# Patient Record
Sex: MALE | Race: WHITE | NOT HISPANIC OR LATINO | Employment: OTHER | URBAN - METROPOLITAN AREA
[De-identification: names, ages, dates, MRNs, and addresses within clinical notes are randomized per-mention and may not be internally consistent; named-entity substitution may affect disease eponyms.]

---

## 2017-01-04 ENCOUNTER — HOSPITAL ENCOUNTER (OUTPATIENT)
Dept: LAB | Facility: MEDICAL CENTER | Age: 62
End: 2017-01-04
Attending: PHYSICIAN ASSISTANT
Payer: MEDICARE

## 2017-01-04 LAB
25(OH)D3 SERPL-MCNC: 35 NG/ML (ref 30–100)
ALBUMIN SERPL BCP-MCNC: 4.5 G/DL (ref 3.2–4.9)
ALBUMIN/GLOB SERPL: 1.7 G/DL
ALP SERPL-CCNC: 51 U/L (ref 30–99)
ALT SERPL-CCNC: 12 U/L (ref 2–50)
ANION GAP SERPL CALC-SCNC: 9 MMOL/L (ref 0–11.9)
AST SERPL-CCNC: 15 U/L (ref 12–45)
BASOPHILS # BLD AUTO: 0.04 K/UL (ref 0–0.12)
BASOPHILS NFR BLD AUTO: 0.5 % (ref 0–1.8)
BILIRUB SERPL-MCNC: 2.3 MG/DL (ref 0.1–1.5)
BUN SERPL-MCNC: 28 MG/DL (ref 8–22)
CALCIUM SERPL-MCNC: 9.7 MG/DL (ref 8.5–10.5)
CHLORIDE SERPL-SCNC: 103 MMOL/L (ref 96–112)
CHOLEST SERPL-MCNC: 177 MG/DL (ref 100–199)
CO2 SERPL-SCNC: 27 MMOL/L (ref 20–33)
CREAT SERPL-MCNC: 1.49 MG/DL (ref 0.5–1.4)
EOSINOPHIL # BLD: 0.27 K/UL (ref 0–0.51)
EOSINOPHIL NFR BLD AUTO: 3.5 % (ref 0–6.9)
ERYTHROCYTE [DISTWIDTH] IN BLOOD BY AUTOMATED COUNT: 43.5 FL (ref 35.9–50)
FOLATE SERPL-MCNC: 7.8 NG/ML
GLOBULIN SER CALC-MCNC: 2.7 G/DL (ref 1.9–3.5)
GLUCOSE SERPL-MCNC: 106 MG/DL (ref 65–99)
HCT VFR BLD AUTO: 52.2 % (ref 42–52)
HDLC SERPL-MCNC: 49 MG/DL
HGB BLD-MCNC: 17.6 G/DL (ref 14–18)
IMM GRANULOCYTES # BLD AUTO: 0.02 K/UL (ref 0–0.11)
IMM GRANULOCYTES NFR BLD AUTO: 0.3 % (ref 0–0.9)
IRON SATN MFR SERPL: 34 % (ref 15–55)
IRON SERPL-MCNC: 119 UG/DL (ref 50–180)
LDLC SERPL CALC-MCNC: 103 MG/DL
LYMPHOCYTES # BLD: 2.32 K/UL (ref 1–4.8)
LYMPHOCYTES NFR BLD AUTO: 30.1 % (ref 22–41)
MCH RBC QN AUTO: 32.1 PG (ref 27–33)
MCHC RBC AUTO-ENTMCNC: 33.7 G/DL (ref 33.7–35.3)
MCV RBC AUTO: 95.3 FL (ref 81.4–97.8)
MONOCYTES # BLD: 0.62 K/UL (ref 0–0.85)
MONOCYTES NFR BLD AUTO: 8 % (ref 0–13.4)
NEUTROPHILS # BLD: 4.44 K/UL (ref 1.82–7.42)
NEUTROPHILS NFR BLD AUTO: 57.6 % (ref 44–72)
NRBC # BLD AUTO: 0 K/UL
NRBC BLD-RTO: 0 /100 WBC
PLATELET # BLD AUTO: 173 K/UL (ref 164–446)
PMV BLD AUTO: 10.6 FL (ref 9–12.9)
POTASSIUM SERPL-SCNC: 3.6 MMOL/L (ref 3.6–5.5)
PREALB SERPL-MCNC: 27 MG/DL (ref 18–38)
PROT SERPL-MCNC: 7.2 G/DL (ref 6–8.2)
PTH-INTACT SERPL-MCNC: 64.4 PG/ML (ref 14–72)
RBC # BLD AUTO: 5.48 M/UL (ref 4.7–6.1)
SODIUM SERPL-SCNC: 139 MMOL/L (ref 135–145)
TIBC SERPL-MCNC: 347 UG/DL (ref 250–450)
TRANSFERRIN SERPL-MCNC: 245 MG/DL (ref 200–370)
TRIGL SERPL-MCNC: 124 MG/DL (ref 0–149)
VIT B12 SERPL-MCNC: 261 PG/ML (ref 211–911)
WBC # BLD AUTO: 7.7 K/UL (ref 4.8–10.8)

## 2017-01-04 PROCEDURE — 36415 COLL VENOUS BLD VENIPUNCTURE: CPT

## 2017-01-04 PROCEDURE — 85025 COMPLETE CBC W/AUTO DIFF WBC: CPT

## 2017-01-04 PROCEDURE — 82746 ASSAY OF FOLIC ACID SERUM: CPT

## 2017-01-04 PROCEDURE — 84425 ASSAY OF VITAMIN B-1: CPT

## 2017-01-04 PROCEDURE — 80061 LIPID PANEL: CPT

## 2017-01-04 PROCEDURE — 82607 VITAMIN B-12: CPT

## 2017-01-04 PROCEDURE — 84466 ASSAY OF TRANSFERRIN: CPT

## 2017-01-04 PROCEDURE — 80053 COMPREHEN METABOLIC PANEL: CPT

## 2017-01-04 PROCEDURE — 83970 ASSAY OF PARATHORMONE: CPT

## 2017-01-04 PROCEDURE — 83550 IRON BINDING TEST: CPT

## 2017-01-04 PROCEDURE — 82306 VITAMIN D 25 HYDROXY: CPT

## 2017-01-04 PROCEDURE — 83540 ASSAY OF IRON: CPT

## 2017-01-04 PROCEDURE — 84134 ASSAY OF PREALBUMIN: CPT

## 2017-01-07 LAB — VIT B1 BLD-MCNC: 97 NMOL/L (ref 70–180)

## 2017-01-12 ENCOUNTER — PATIENT MESSAGE (OUTPATIENT)
Dept: MEDICAL GROUP | Facility: MEDICAL CENTER | Age: 62
End: 2017-01-12

## 2017-01-12 DIAGNOSIS — N18.30 CHRONIC KIDNEY DISEASE (CKD), STAGE III (MODERATE) (HCC): ICD-10-CM

## 2017-01-12 NOTE — TELEPHONE ENCOUNTER
From: Max Alberto  To: Tim Serna M.D.  Sent: 1/12/2017 1:30 PM PST  Subject: Test Result Question    I had a appointment at Dr Carmen office today. I was asked to have you check my latest blood work results. Vale Mcdonough was questioning of any possible kidney issues. Would appreciate if you could check. And let me know.

## 2017-01-12 NOTE — TELEPHONE ENCOUNTER
From: Montse Alberto  To: Tim Serna M.D.  Sent: 1/12/2017 3:08 PM PST  Subject: Test Result Question    Have been having some back and left knee pain issues and have taken the Percocet meds (maybe a total of 3 per day, Maybe 2 or 3 days a week for the past 7-8 weeks) or 2 Aleve's 1 or 2 days when not taking the Percocet. Did use some of the Baclofen (topical cream) on the knee issue in the past 3 weeks. Do I go to any [facility] for the blood test (I usually go [facility] the lab [facility] [address]). When do you want me to go have it done?  ----- Message -----  From: Tim Serna M.D.  Sent: 1/12/2017 1:54 PM PST  To: Montse Alberto  Subject: RE: Test Result Question    Your most recently lab work shows that your kidney function was slightly more reduced than normal.  Have you been using a lot of anti-inflammatory medication like ibuprofen or Aleve, recently?  Are you hydrating well?    We should recheck blood work and have you follow up in clinic to review results. I have ordered new nonfasting labs for you to have done at [facility].    Dr. Serna         ----- Message -----   From: MONTSE ALBERTO   Sent: 1/12/2017 1:30 PM PST   To: Tim Serna M.D.  Subject: Test Result Question    I had a appointment at Dr Carmen office today. I was asked to have you check my latest blood work results. Vale Mcdonough was questioning of any possible kidney issues. Would appreciate if you could check. And let me know.

## 2017-01-17 ENCOUNTER — PATIENT MESSAGE (OUTPATIENT)
Dept: MEDICAL GROUP | Facility: MEDICAL CENTER | Age: 62
End: 2017-01-17

## 2017-01-17 NOTE — TELEPHONE ENCOUNTER
From: Montse Alberto  To: Tim Serna M.D.  Sent: 1/17/2017 6:04 AM PST  Subject: Test Result Question    Does it matter if nonfasting or fasting?  ----- Message -----  From: Tim Serna M.D.  Sent: 1/12/2017 3:10 PM PST  To: Montse Alberto  Subject: RE: Test Result Question    You can use the [facility] lab off [address]. Have the blood test done some time next week.    Dr. Serna      ----- Message -----   From: MONTSE ALBERTO   Sent: 1/12/2017 3:08 PM PST   To: Tim Serna M.D.  Subject: Test Result Question    Have been having some back and left knee pain issues and have taken the Percocet meds (maybe a total of 3 per day, Maybe 2 or 3 days a week for the past 7-8 weeks) or 2 Aleve's 1 or 2 days when not taking the Percocet. Did use some of the Baclofen (topical cream) on the knee issue in the past 3 weeks. Do I go to any [facility] for the blood test (I usually go to the lab at the hospital on [address]). When do you want me to go have it done?  ----- Message -----  From: Tim Serna M.D.  Sent: 1/12/2017 1:54 PM PST  To: Montse Alberto  Subject: RE: Test Result Question    Your most recently lab work shows that your kidney function was slightly more reduced than normal.  Have you been using a lot of anti-inflammatory medication like ibuprofen or Aleve, recently?  Are you hydrating well?    We should recheck blood work and have you follow up in clinic to review results. I have ordered new nonfasting labs for you to have done at [facility].    Dr. Serna         ----- Message -----   From: MONTSE ALBERTO   Sent: 1/12/2017 1:30 PM PST   To: Tim Serna M.D.  Subject: Test Result Question    I had a appointment at Dr Carmen office today. I was asked to have you check my latest blood work results. Rami Ceasar was questioning of any possible kidney issues. Would appreciate if you could check. And let me know.

## 2017-01-18 ENCOUNTER — HOSPITAL ENCOUNTER (OUTPATIENT)
Dept: LAB | Facility: MEDICAL CENTER | Age: 62
End: 2017-01-18
Attending: FAMILY MEDICINE
Payer: MEDICARE

## 2017-01-18 DIAGNOSIS — N18.30 CHRONIC KIDNEY DISEASE (CKD), STAGE III (MODERATE) (HCC): ICD-10-CM

## 2017-01-18 LAB
ANION GAP SERPL CALC-SCNC: 9 MMOL/L (ref 0–11.9)
BUN SERPL-MCNC: 25 MG/DL (ref 8–22)
CALCIUM SERPL-MCNC: 9.6 MG/DL (ref 8.5–10.5)
CHLORIDE SERPL-SCNC: 105 MMOL/L (ref 96–112)
CO2 SERPL-SCNC: 26 MMOL/L (ref 20–33)
CREAT SERPL-MCNC: 1.27 MG/DL (ref 0.5–1.4)
CREAT UR-MCNC: 159 MG/DL
GLUCOSE SERPL-MCNC: 96 MG/DL (ref 65–99)
MICROALBUMIN UR-MCNC: <0.7 MG/DL
MICROALBUMIN/CREAT UR: NORMAL MG/G (ref 0–30)
POTASSIUM SERPL-SCNC: 3.7 MMOL/L (ref 3.6–5.5)
SODIUM SERPL-SCNC: 140 MMOL/L (ref 135–145)

## 2017-01-18 PROCEDURE — 36415 COLL VENOUS BLD VENIPUNCTURE: CPT

## 2017-01-18 PROCEDURE — 80048 BASIC METABOLIC PNL TOTAL CA: CPT

## 2017-01-18 PROCEDURE — 82043 UR ALBUMIN QUANTITATIVE: CPT

## 2017-01-18 PROCEDURE — 82570 ASSAY OF URINE CREATININE: CPT

## 2017-01-19 ENCOUNTER — TELEPHONE (OUTPATIENT)
Dept: MEDICAL GROUP | Facility: MEDICAL CENTER | Age: 62
End: 2017-01-19

## 2017-01-19 NOTE — TELEPHONE ENCOUNTER
----- Message from Tim Serna M.D. sent at 1/19/2017  7:30 AM PST -----  Please notify patient kidney function has improved from 2 weeks ago and is now more consistent with what it was 6 months ago and one year ago. Urine test does not show any protein in urine, which means there is no active kidney damage occurring.  Overall things are stable. We will need to recheck labs in 6-12 months.  Tim Serna M.D.

## 2017-07-01 ENCOUNTER — HOSPITAL ENCOUNTER (OUTPATIENT)
Dept: LAB | Facility: MEDICAL CENTER | Age: 62
End: 2017-07-01
Attending: PHYSICIAN ASSISTANT
Payer: MEDICARE

## 2017-07-01 LAB
25(OH)D3 SERPL-MCNC: 36 NG/ML (ref 30–100)
ALBUMIN SERPL BCP-MCNC: 4.1 G/DL (ref 3.2–4.9)
ALBUMIN/GLOB SERPL: 1.4 G/DL
ALP SERPL-CCNC: 49 U/L (ref 30–99)
ALT SERPL-CCNC: 16 U/L (ref 2–50)
ANION GAP SERPL CALC-SCNC: 9 MMOL/L (ref 0–11.9)
AST SERPL-CCNC: 17 U/L (ref 12–45)
BASOPHILS # BLD AUTO: 0.5 % (ref 0–1.8)
BASOPHILS # BLD: 0.03 K/UL (ref 0–0.12)
BILIRUB SERPL-MCNC: 2.2 MG/DL (ref 0.1–1.5)
BUN SERPL-MCNC: 22 MG/DL (ref 8–22)
CALCIUM SERPL-MCNC: 9.4 MG/DL (ref 8.5–10.5)
CHLORIDE SERPL-SCNC: 104 MMOL/L (ref 96–112)
CHOLEST SERPL-MCNC: 143 MG/DL (ref 100–199)
CO2 SERPL-SCNC: 29 MMOL/L (ref 20–33)
CREAT SERPL-MCNC: 1.34 MG/DL (ref 0.5–1.4)
EOSINOPHIL # BLD AUTO: 0.33 K/UL (ref 0–0.51)
EOSINOPHIL NFR BLD: 5.4 % (ref 0–6.9)
ERYTHROCYTE [DISTWIDTH] IN BLOOD BY AUTOMATED COUNT: 43.4 FL (ref 35.9–50)
FOLATE SERPL-MCNC: 13.1 NG/ML
GFR SERPL CREATININE-BSD FRML MDRD: 54 ML/MIN/1.73 M 2
GLOBULIN SER CALC-MCNC: 3 G/DL (ref 1.9–3.5)
GLUCOSE SERPL-MCNC: 94 MG/DL (ref 65–99)
HCT VFR BLD AUTO: 50.1 % (ref 42–52)
HDLC SERPL-MCNC: 48 MG/DL
HGB BLD-MCNC: 17.4 G/DL (ref 14–18)
IMM GRANULOCYTES # BLD AUTO: 0.01 K/UL (ref 0–0.11)
IMM GRANULOCYTES NFR BLD AUTO: 0.2 % (ref 0–0.9)
IRON SATN MFR SERPL: 37 % (ref 15–55)
IRON SERPL-MCNC: 112 UG/DL (ref 50–180)
LDLC SERPL CALC-MCNC: 67 MG/DL
LYMPHOCYTES # BLD AUTO: 2.03 K/UL (ref 1–4.8)
LYMPHOCYTES NFR BLD: 33.2 % (ref 22–41)
MCH RBC QN AUTO: 32.6 PG (ref 27–33)
MCHC RBC AUTO-ENTMCNC: 34.7 G/DL (ref 33.7–35.3)
MCV RBC AUTO: 93.8 FL (ref 81.4–97.8)
MONOCYTES # BLD AUTO: 0.53 K/UL (ref 0–0.85)
MONOCYTES NFR BLD AUTO: 8.7 % (ref 0–13.4)
NEUTROPHILS # BLD AUTO: 3.19 K/UL (ref 1.82–7.42)
NEUTROPHILS NFR BLD: 52 % (ref 44–72)
NRBC # BLD AUTO: 0 K/UL
NRBC BLD AUTO-RTO: 0 /100 WBC
PLATELET # BLD AUTO: 154 K/UL (ref 164–446)
PMV BLD AUTO: 10.2 FL (ref 9–12.9)
POTASSIUM SERPL-SCNC: 4.1 MMOL/L (ref 3.6–5.5)
PREALB SERPL-MCNC: 26 MG/DL (ref 18–38)
PROT SERPL-MCNC: 7.1 G/DL (ref 6–8.2)
RBC # BLD AUTO: 5.34 M/UL (ref 4.7–6.1)
SODIUM SERPL-SCNC: 142 MMOL/L (ref 135–145)
TIBC SERPL-MCNC: 304 UG/DL (ref 250–450)
TRANSFERRIN SERPL-MCNC: 217 MG/DL (ref 200–370)
TRIGL SERPL-MCNC: 142 MG/DL (ref 0–149)
VIT B12 SERPL-MCNC: 367 PG/ML (ref 211–911)
WBC # BLD AUTO: 6.1 K/UL (ref 4.8–10.8)

## 2017-07-01 PROCEDURE — 80061 LIPID PANEL: CPT

## 2017-07-01 PROCEDURE — 82306 VITAMIN D 25 HYDROXY: CPT

## 2017-07-01 PROCEDURE — 84134 ASSAY OF PREALBUMIN: CPT

## 2017-07-01 PROCEDURE — 83550 IRON BINDING TEST: CPT

## 2017-07-01 PROCEDURE — 82746 ASSAY OF FOLIC ACID SERUM: CPT

## 2017-07-01 PROCEDURE — 36415 COLL VENOUS BLD VENIPUNCTURE: CPT

## 2017-07-01 PROCEDURE — 80053 COMPREHEN METABOLIC PANEL: CPT

## 2017-07-01 PROCEDURE — 84425 ASSAY OF VITAMIN B-1: CPT

## 2017-07-01 PROCEDURE — 84466 ASSAY OF TRANSFERRIN: CPT

## 2017-07-01 PROCEDURE — 85025 COMPLETE CBC W/AUTO DIFF WBC: CPT

## 2017-07-01 PROCEDURE — 82607 VITAMIN B-12: CPT

## 2017-07-01 PROCEDURE — 83540 ASSAY OF IRON: CPT

## 2017-07-04 LAB — VIT B1 BLD-MCNC: 97 NMOL/L (ref 70–180)

## 2017-07-10 RX ORDER — TRIAMTERENE AND HYDROCHLOROTHIAZIDE 37.5; 25 MG/1; MG/1
TABLET ORAL
Qty: 90 TAB | Refills: 3 | Status: SHIPPED | OUTPATIENT
Start: 2017-07-10 | End: 2018-06-20 | Stop reason: SDUPTHER

## 2017-07-10 NOTE — TELEPHONE ENCOUNTER
Was the patient seen in the last year in this department? Yes     Does patient have an active prescription for medications requested? No     Received Request Via: Pharmacy     Last seen: 11/14/16 Slots

## 2017-07-12 ENCOUNTER — PATIENT MESSAGE (OUTPATIENT)
Dept: HEALTH INFORMATION MANAGEMENT | Facility: OTHER | Age: 62
End: 2017-07-12

## 2017-07-12 ENCOUNTER — PATIENT OUTREACH (OUTPATIENT)
Dept: HEALTH INFORMATION MANAGEMENT | Facility: OTHER | Age: 62
End: 2017-07-12

## 2017-07-12 NOTE — PROGRESS NOTES
7/12/17   -  Outcome: Left Message    WebIZ Checked & Epic Updated:  yes    [facility] Verified: yes    Attempt # 1

## 2017-07-12 NOTE — PROGRESS NOTES
Attempt #:2    WebIZ Checked & Epic Updated: yes  HealthConnect Verified: yes  Verify PCP: yes    Communication Preference Obtained: yes     Review Care Team: yes    Annual Wellness Visit Scheduling  1. Scheduling Status:Scheduled        Care Gap Scheduling      Health Maintenance Due   Topic Date Due   • Annual Wellness Visit  SCHEDULED         MyChart Activation: already active  [employer] Gwendolyn: no  Virtual Visits: no  Opt In to Text Messages: no

## 2017-07-13 ENCOUNTER — TELEPHONE (OUTPATIENT)
Dept: MEDICAL GROUP | Facility: MEDICAL CENTER | Age: 62
End: 2017-07-13

## 2017-07-13 NOTE — TELEPHONE ENCOUNTER
ANNUAL WELLNESS VISIT PRE-VISIT PLANNING     1.  Reviewed note from last office visit with PCP: YES 11/14/16    2.  If any orders were placed at last visit, do we have Results/Consult Notes?        •  Labs - Labs ordered, completed and results are in chart.       •  Imaging - Imaging ordered, NOT completed. Patient advised to complete prior to next appointment.       •  Referrals - No referrals were ordered at last office visit.    3.  Immunizations were updated in [facility] using WebIZ?: Yes       •  WebIZ Recommendations: Td (adult), adsorbed Hep A, adult Influenza w/preserv.         •  Is patient due for Tdap? NO       •  Is patient due for Shingles? NO     4.  Patient is due for the following Health Maintenance Topics:   Health Maintenance Due   Topic Date Due   • Annual Wellness Visit  07/12/2017       - Patient is up-to-date on all Health Maintenance topics. No records have been requested at this time.    5.  Reviewed/Updated the following with patient:       •   Preferred Pharmacy? YES       •   Preferred Lab? YES       •   Medications? YES. Was Abstract Encounter opened and chart updated? YES       •   Social History? YES. Was Abstract Encounter opened and chart updated? YES       •   Family History? YES. Was Abstract Encounter opened and chart updated? YES    6.  Care Team Updated:       •   DME Company (gait device, O2, CPAP, etc.): NO       •   Other Specialists (eye doctor, derm, GYN, cardiology, endo, etc): YES    7.  Patient has the following Care Path diagnoses on Problem List:  NONE    8.  Specialty Comments was updated with diagnosis information provided by [facility]: YES    9.  Patient was advised: “This is a free wellness visit. The provider will screen for medical conditions to help you stay healthy. If you have other concerns to address you may be asked to discuss these at a separate visit or there may be an additional fee.”     6.  Patient was informed to arrive 15 min prior to their scheduled appointment  and bring in their medication bottles.

## 2017-07-18 ENCOUNTER — OFFICE VISIT (OUTPATIENT)
Dept: MEDICAL GROUP | Facility: MEDICAL CENTER | Age: 62
End: 2017-07-18
Payer: MEDICARE

## 2017-07-18 VITALS
OXYGEN SATURATION: 96 % | SYSTOLIC BLOOD PRESSURE: 110 MMHG | HEIGHT: 71 IN | WEIGHT: 280 LBS | HEART RATE: 85 BPM | TEMPERATURE: 98.2 F | DIASTOLIC BLOOD PRESSURE: 80 MMHG | BODY MASS INDEX: 39.2 KG/M2

## 2017-07-18 DIAGNOSIS — E66.9 OBESITY (BMI 30-39.9): ICD-10-CM

## 2017-07-18 DIAGNOSIS — Z96.652 HX OF TOTAL KNEE REPLACEMENT, LEFT: ICD-10-CM

## 2017-07-18 DIAGNOSIS — K21.9 GASTROESOPHAGEAL REFLUX DISEASE, ESOPHAGITIS PRESENCE NOT SPECIFIED: ICD-10-CM

## 2017-07-18 DIAGNOSIS — M54.50 CHRONIC MIDLINE LOW BACK PAIN WITHOUT SCIATICA: ICD-10-CM

## 2017-07-18 DIAGNOSIS — J30.1 SEASONAL ALLERGIC RHINITIS DUE TO POLLEN: ICD-10-CM

## 2017-07-18 DIAGNOSIS — G89.29 CHRONIC MIDLINE LOW BACK PAIN WITHOUT SCIATICA: ICD-10-CM

## 2017-07-18 DIAGNOSIS — K43.2 INCISIONAL HERNIA, WITHOUT OBSTRUCTION OR GANGRENE: ICD-10-CM

## 2017-07-18 DIAGNOSIS — N18.30 CHRONIC KIDNEY DISEASE (CKD), STAGE III (MODERATE) (HCC): ICD-10-CM

## 2017-07-18 DIAGNOSIS — M25.562 CHRONIC PAIN OF LEFT KNEE: ICD-10-CM

## 2017-07-18 DIAGNOSIS — I10 ESSENTIAL HYPERTENSION: ICD-10-CM

## 2017-07-18 DIAGNOSIS — I87.2 CHRONIC VENOUS INSUFFICIENCY: ICD-10-CM

## 2017-07-18 DIAGNOSIS — G43.909 MIGRAINE WITHOUT STATUS MIGRAINOSUS, NOT INTRACTABLE, UNSPECIFIED MIGRAINE TYPE: ICD-10-CM

## 2017-07-18 DIAGNOSIS — Z98.890 HISTORY OF GASTRIC SURGERY: ICD-10-CM

## 2017-07-18 DIAGNOSIS — G89.29 CHRONIC PAIN OF LEFT KNEE: ICD-10-CM

## 2017-07-18 DIAGNOSIS — L21.9 DERMATITIS, SEBORRHEIC: ICD-10-CM

## 2017-07-18 DIAGNOSIS — Z00.00 MEDICARE ANNUAL WELLNESS VISIT, SUBSEQUENT: ICD-10-CM

## 2017-07-18 DIAGNOSIS — H93.13 TINNITUS OF BOTH EARS: ICD-10-CM

## 2017-07-18 DIAGNOSIS — G47.30 SLEEP APNEA WITH USE OF CONTINUOUS POSITIVE AIRWAY PRESSURE (CPAP): ICD-10-CM

## 2017-07-18 PROCEDURE — G0439 PPPS, SUBSEQ VISIT: HCPCS | Performed by: FAMILY MEDICINE

## 2017-07-18 RX ORDER — AMOXICILLIN 500 MG/1
2000 CAPSULE ORAL
Qty: 16 CAP | Refills: 0 | Status: SHIPPED | OUTPATIENT
Start: 2017-07-18 | End: 2018-06-12 | Stop reason: SDUPTHER

## 2017-07-18 RX ORDER — OXYCODONE AND ACETAMINOPHEN 10; 325 MG/1; MG/1
1 TABLET ORAL 3 TIMES DAILY PRN
Qty: 90 TAB | Refills: 0 | Status: ON HOLD | OUTPATIENT
Start: 2017-07-18 | End: 2018-10-01

## 2017-07-18 ASSESSMENT — PATIENT HEALTH QUESTIONNAIRE - PHQ9: CLINICAL INTERPRETATION OF PHQ2 SCORE: 0

## 2017-07-18 NOTE — MR AVS SNAPSHOT
"        Max Alberto   2017 8:20 AM   Office Visit   MRN: [id number]    Department:  [facility]   Dept Phone:  [phone]    Description:  Male : 1955   Provider:  Tim Serna M.D.; [facility]            Reason for Visit     Annual Wellness Visit           Allergies as of 2017     Allergen Noted Reactions    Vicodin [Hydrocodone-Acetaminophen] 2014   Rash    On face only-bridge of nose      You were diagnosed with     Medicare annual wellness visit, subsequent   [581583]       Obesity (BMI 30-39.9)   [648968]       Essential hypertension   [0456879]       History of gastric surgery   [3314101]       Chronic kidney disease (CKD), stage III (moderate)   [872508]       Chronic pain of left knee   [080796]       Chronic midline low back pain without sciatica   [3582523]       Hx of total knee replacement, left   [550169]       Chronic venous insufficiency   [364030]       Migraine without status migrainosus, not intractable, unspecified migraine type   [2865187]       Gastroesophageal reflux disease, esophagitis presence not specified   [1188099]       Seasonal allergic rhinitis due to pollen   [7104713]       Sleep apnea with use of continuous positive airway pressure (CPAP)   [7389031]       Tinnitus of both ears   [029518]       Dermatitis, seborrheic   [465043]       Incisional hernia, without obstruction or gangrene   [901484]         Vital Signs     Blood Pressure Pulse Temperature Height Weight Body Mass Index    110/80 mmHg 85 36.8 °C (98.2 °F) 1.803 m (5' 11\") 127.007 kg (280 lb) 39.07 kg/m2    Oxygen Saturation Smoking Status                96% Former Smoker          Basic Information     Date Of Birth Sex Race Ethnicity Preferred Language    1955 Male White Non- English      Problem List              ICD-10-CM Priority Class Noted - Resolved    Chronic venous insufficiency I87.2   2014 - Present    HTN (hypertension) I10   2014 - " Present    Migraines G43.909   9/4/2014 - Present    GERD (gastroesophageal reflux disease) K21.9   9/4/2014 - Present    Seasonal allergies J30.2   9/4/2014 - Present    Sleep apnea with use of continuous positive airway pressure (CPAP) G47.30   9/4/2014 - Present    Tinnitus of both ears H93.13   10/13/2014 - Present    Hx of total knee replacement Z96.659   4/30/2015 - Present    Chronic lower back pain M54.5, G89.29   6/23/2015 - Present    Dermatitis, seborrheic L21.9   6/23/2015 - Present    Obesity (BMI 30-39.9) E66.9   6/23/2015 - Present    Chronic pain of left knee M25.562, G89.29   8/5/2015 - Present    Chronic kidney disease (CKD), stage III (moderate) N18.3   1/12/2017 - Present    History of gastric surgery Z98.890   7/18/2017 - Present    Incisional hernia, without obstruction or gangrene K43.2   7/18/2017 - Present      Health Maintenance        Date Due Completion Dates    IMM INFLUENZA (1) 9/1/2017 10/25/2016, 10/25/2016, 10/6/2015, 10/13/2014    COLONOSCOPY 6/16/2018 6/16/2008 (Done)    Override on 6/16/2008: Done    IMM DTaP/Tdap/Td Vaccine (2 - Td) 6/16/2025 6/16/2015            Current Immunizations     Influenza Vaccine Quad Inj (Pf) 10/25/2016  9:58 AM, 10/13/2014  8:29 AM    Influenza Vaccine Quad Inj (Preserved) 10/25/2016, 10/6/2015  8:05 AM    SHINGLES VACCINE 7/28/2016    Tdap Vaccine 6/16/2015      Below and/or attached are the medications your provider expects you to take. Review all of your home medications and newly ordered medications with your provider and/or pharmacist. Follow medication instructions as directed by your provider and/or pharmacist. Please keep your medication list with you and share with your provider. Update the information when medications are discontinued, doses are changed, or new medications (including over-the-counter products) are added; and carry medication information at all times in the event of emergency situations     Allergies:  VICODIN - Rash                Medications  Valid as of: July 18, 2017 -  8:59 AM    Generic Name Brand Name Tablet Size Instructions for use    Amoxicillin (Cap) AMOXIL 500 MG Take 4 Caps by mouth Pre-Op for 1 dose.        Cholecalciferol (Tab) cholecalciferol 1000 UNIT Take 2,000 Units by mouth every day.        Omeprazole (CAPSULE DELAYED RELEASE) PRILOSEC 20 MG TAKE ONE CAPSULE BY MOUTH EVERY DAY        Oxycodone-Acetaminophen (Tab) PERCOCET-10  MG Take 1 Tab by mouth 3 times a day as needed for Severe Pain.        Sulfacetamide Sodium (Shampoo) Sulfacetamide Sodium 10 % 1 application once daily        SUMAtriptan Succinate (Tab) IMITREX 100 MG Take 1 Tab by mouth Once PRN. migraine        Triamterene-HCTZ (Tab) MAXZIDE-25/DYAZIDE 37.5-25 MG TAKE 1 TABLET BY MOUTH EVERY DAY        .                 Medicines prescribed today were sent to:     [facility]Y #[id number] - [place], [place] - [address]    [address] [place] [address]    Phone: [phone] Fax: [phone]    Open 24 Hours?: No      Medication refill instructions:       If your prescription bottle indicates you have medication refills left, it is not necessary to call your provider’s office. Please contact your pharmacy and they will refill your medication.    If your prescription bottle indicates you do not have any refills left, you may request refills at any time through one of the following ways: The online [employer] system (except Urgent Care), by calling your provider’s office, or by asking your pharmacy to contact your provider’s office with a refill request. Medication refills are processed only during regular business hours and may not be available until the next business day. Your provider may request additional information or to have a follow-up visit with you prior to refilling your medication.   *Please Note: Medication refills are assigned a new Rx number when refilled electronically. Your pharmacy may indicate that no refills were authorized even though  a new prescription for the same medication is available at the pharmacy. Please request the medicine by name with the pharmacy before contacting your provider for a refill.           [employer] Access Code: Activation code not generated  Current [employer] Status: Active

## 2017-07-18 NOTE — PROGRESS NOTES
Chief Complaint   Patient presents with   • Annual Wellness Visit         HPI:  Max is a 62 y.o. here for Medicare Annual Wellness Visit         Patient Active Problem List    Diagnosis Date Noted   • History of gastric surgery 07/18/2017   • Incisional hernia, without obstruction or gangrene 07/18/2017   • Chronic kidney disease (CKD), stage III (moderate) 01/12/2017   • Chronic pain of left knee 08/05/2015   • Chronic lower back pain 06/23/2015   • Dermatitis, seborrheic 06/23/2015   • Obesity (BMI 30-39.9) 06/23/2015   • Hx of total knee replacement 04/30/2015   • Tinnitus of both ears 10/13/2014   • Chronic venous insufficiency 09/04/2014   • HTN (hypertension) 09/04/2014   • Migraines 09/04/2014   • GERD (gastroesophageal reflux disease) 09/04/2014   • Seasonal allergies 09/04/2014   • Sleep apnea with use of continuous positive airway pressure (CPAP) 09/04/2014       Current Outpatient Prescriptions   Medication Sig Dispense Refill   • oxycodone-acetaminophen (PERCOCET-10)  MG Tab Take 1 Tab by mouth 3 times a day as needed for Severe Pain. 90 Tab 0   • amoxicillin (AMOXIL) 500 MG Cap Take 4 Caps by mouth Pre-Op for 1 dose. 16 Cap 0   • triamterene-hctz (MAXZIDE-25/DYAZIDE) 37.5-25 MG Tab TAKE 1 TABLET BY MOUTH EVERY DAY 90 Tab 3   • Sulfacetamide Sodium 10 % Shampoo 1 application once daily     • omeprazole (PRILOSEC) 20 MG delayed-release capsule TAKE ONE CAPSULE BY MOUTH EVERY DAY 90 Cap 3   • vitamin D (CHOLECALCIFEROL) 1000 UNIT Tab Take 2,000 Units by mouth every day.     • sumatriptan (IMITREX) 100 MG tablet Take 1 Tab by mouth Once PRN. migraine 10 Tab 2     No current facility-administered medications for this visit.        The patient reports adherence to this regimen   Current supplements as per medication list.   Chronic narcotic pain medicines: yes     Allergies: Vicodin    Current social contact/activities: Pt babysit's and travels.    Exercise:currently not exercising    Is patient  current with immunizations?  no     He  reports that he quit smoking about 23 years ago. His smoking use included Cigarettes. He has a 25 pack-year smoking history. He has never used smokeless tobacco. He reports that he drinks about 0.5 oz of alcohol per week. He reports that he does not use illicit drugs.  Counseling given: Not Answered        DPA/Advanced directive: .has NO advanced directive  - add't info requested. Referral to SW: yes                        ROS:    Gait: Uses a scooter for long distance walking    Ostomy: no   Other tubes: no   Amputations: no   Chronic oxygen use no   Last eye exam 10/2016   : Denies incontinence.           Depression Screening    Little interest or pleasure in doing things?  0 - not at all  Feeling down, depressed , or hopeless? 0 - not at all  Trouble falling or staying asleep, or sleeping too much?     Feeling tired or having little energy?     Poor appetite or overeating?     Feeling bad about yourself - or that you are a failure or have let yourself or your family down?    Trouble concentrating on things, such as reading the newspaper or watching television?    Moving or speaking so slowly that other people could have noticed.  Or the opposite - being so fidgety or restless that you have been moving around a lot more than usual?     Thoughts that you would be better off dead, or of hurting yourself?     Patient Health Questionnaire Score:        If depressive symptoms identified deferred to follow up visit unless specifically addressed in assessment and plan.    Interpretation of PHQ-9 Total Score   Score Severity   1-4 No Depression   5-9 Mild Depression   10-14 Moderate Depression   15-19 Moderately Severe Depression   20-27 Severe Depression    Screening for Cognitive Impairment    Three Minute Recall (apple, watch, naomi)  3/3    Draw clock face with all 12 numbers set to the hand to show 10 minutes past 11 o'clock  1 5/5  Cognitive concerns identified deferred for  follow up unless specifically addressed in assessment and plan.    Fall Risk Assessment    Has the patient had two or more falls in the last year or any fall with injury in the last year?  No    Safety Assessment    Throw rugs on floor.  Yes  Handrails on all stairs.  Yes  Good lighting in all hallways.  Yes  Difficulty hearing.  No  Patient counseled about all safety risks that were identified.    Functional Assessment ADLs    Are there any barriers preventing you from cooking for yourself or meeting nutritional needs?  No.    Are there any barriers preventing you from driving safely or obtaining transportation?  No.    Are there any barriers preventing you from using a telephone or calling for help?  No.    Are there any barriers preventing you from shopping?  No.    Are there any barriers preventing you from taking care of your own finances?  No.    Are there any barriers preventing you from managing your medications?  No.    Are currently engaging any exercise or physical activity?  No.       Health Maintenance Summary                Annual Wellness Visit Overdue 7/12/2017      Done 7/11/2016 Visit Dx: Medicare annual wellness visit, subsequent     Patient has more history with this topic...    IMM INFLUENZA Next Due 9/1/2017      Done 10/25/2016 Imm Admin: Influenza Vaccine Quad Inj (Preserved)     Patient has more history with this topic...    COLONOSCOPY Next Due 6/16/2018      Done 6/16/2008     IMM DTaP/Tdap/Td Vaccine Next Due 6/16/2025      Done 6/16/2015 Imm Admin: Tdap Vaccine          Patient Care Team:  Tim Serna M.D. as PCP - General (Family Medicine)  Julian Carmen M.D. as Consulting Physician (Surgery)  Oscar Avila M.D. as Consulting Physician (Orthopaedics)  [facility] as Consulting Physician  Sulaiman Hook D.D.SDieter as Consulting Physician (Dentistry)  Jorge Luis Ventura M.D. as Consulting Physician (Ophthalmology)      Social History   Substance Use Topics   • Smoking status: Former  "Smoker -- 1.00 packs/day for 25 years     Types: Cigarettes     Quit date: 01/01/1994   • Smokeless tobacco: Never Used   • Alcohol Use: 0.5 oz/week     1 Cans of beer per week      Comment: occational     Family History   Problem Relation Age of Onset   • Diabetes Mother    • Arthritis Mother    • Lung Disease Father    • Arthritis Father    • Heart Disease Father    • Alcohol/Drug Brother      He  has a past medical history of Hypertension; GERD (gastroesophageal reflux disease); Headache, classical migraine; Chronic venous insufficiency (9/4/2014); Arthritis; Indigestion; Snoring; Dental disorder (12/17/14); Sleep apnea; Heart burn; Pain (12/17/14); and MEDICAL HOME. He also has no past medical history of Cold or Anesthesia.   Past Surgical History   Procedure Laterality Date   • Knee replacement, total  2012     left   • Appendectomy     • Cholecystectomy     • Gastric sleeve laparoscopy  12/29/2014     Performed by Julian Carmen M.D. at SURGERY [facility]           Exam:     Blood pressure 110/80, pulse 85, temperature 36.8 °C (98.2 °F), height 1.803 m (5' 11\"), weight 127.007 kg (280 lb), SpO2 96 %. Body mass index is 39.07 kg/(m^2).    Constitutional: Alert, no distress.  Skin: Warm, dry, good turgor, small recurrent sebaceous cyst on right upper back  Eye: Equal, round and reactive, conjunctiva clear, lids normal.  Abdomen: Soft, non-tender, no masses, no hepatosplenomegaly. No hernia appreciated but hernia is reported over incision site  Psych: Alert and oriented x3, normal affect and mood.      Assessment and Plan. The following treatment and monitoring plan is recommended:    1. Medicare annual wellness visit, subsequent  - Annual Wellness Visit - Includes PPPS Subsequent ()    2. Obesity (BMI 30-39.9)  - Patient identified as having weight management issue.  Appropriate orders and counseling given.  - Annual Wellness Visit - Includes PPPS Subsequent ()    3. Essential " hypertension  Controlled. Continue current medication.  - Annual Wellness Visit - Includes PPPS Subsequent ()    4. History of gastric surgery  Stable. Continue following with bariatric surgeon every 6 months.  - Annual Wellness Visit - Includes PPPS Subsequent ()    5. Chronic kidney disease (CKD), stage III (moderate)  Stable. Continue lab work every 6 months through bariatric surgeon office. Follow-up with us annually.  - Annual Wellness Visit - Includes PPPS Subsequent ()    6. Chronic pain of left knee  Significant pain even after knee replacement.  Patient is about 90 Percocet per year. Refill given today. Follow-up annually.  - oxycodone-acetaminophen (PERCOCET-10)  MG Tab; Take 1 Tab by mouth 3 times a day as needed for Severe Pain.  Dispense: 90 Tab; Refill: 0  - Annual Wellness Visit - Includes PPPS Subsequent ()    7. Chronic midline low back pain without sciatica  Patient is about 90 Percocet per year. Refill given today. Follow-up annually.  - oxycodone-acetaminophen (PERCOCET-10)  MG Tab; Take 1 Tab by mouth 3 times a day as needed for Severe Pain.  Dispense: 90 Tab; Refill: 0  - Annual Wellness Visit - Includes PPPS Subsequent ()    8. Hx of total knee replacement, left  Patient was advised by orthopedic surgeon to have amoxicillin before dental procedures. Prescription for amoxicillin given.  - Annual Wellness Visit - Includes PPPS Subsequent ()    9. Chronic venous insufficiency  Controlled with diuretic. Continue to monitor.  - Annual Wellness Visit - Includes PPPS Subsequent ()    10. Migraine without status migrainosus, not intractable, unspecified migraine type  Less frequent. About once monthly.  - Annual Wellness Visit - Includes PPPS Subsequent ()    11. Gastroesophageal reflux disease, esophagitis presence not specified  Controlled. Continue omeprazole.  - Annual Wellness Visit - Includes PPPS Subsequent ()    12. Seasonal allergic  rhinitis due to pollen  Irregular and related to seasons. Continue to monitor.  - Annual Wellness Visit - Includes PPPS Subsequent ()    13. Sleep apnea with use of continuous positive airway pressure (CPAP)  Continue with CPAP.  - Annual Wellness Visit - Includes PPPS Subsequent ()    14. Tinnitus of both ears  Stable. Continue to monitor.  - Annual Wellness Visit - Includes PPPS Subsequent ()    15. Dermatitis, seborrheic  Advised patient to a trial of Selsun Blue.  - Annual Wellness Visit - Includes PPPS Subsequent ()    16. Incisional hernia, without obstruction or gangrene  Advised patient to monitor for recurrence and pain. Advised patient to avoid heavy lifting.  - Annual Wellness Visit - Includes PPPS Subsequent ()        Services needed: No services needed at this time  Health Care Screening recommendations as per orders if indicated.  Referrals offered: PT/OT/Nutrition counseling/Behavioral Health/Smoking cessation as per orders if indicated.    Discussion today about general wellness and lifestyle habits:    · Prevent falls and reduce trip hazards; Cautioned about securing or removing rugs.  · Have a working fire alarm and carbon monoxide detector;   · Engage in regular physical activity and social activities    Follow-up: Return in about 1 year (around 7/18/2018) for Annual, Short.

## 2017-08-04 RX ORDER — OMEPRAZOLE 20 MG/1
CAPSULE, DELAYED RELEASE ORAL
Qty: 90 CAP | Refills: 3 | Status: SHIPPED | OUTPATIENT
Start: 2017-08-04 | End: 2018-06-28 | Stop reason: SDUPTHER

## 2017-09-19 ENCOUNTER — PATIENT MESSAGE (OUTPATIENT)
Dept: MEDICAL GROUP | Facility: MEDICAL CENTER | Age: 62
End: 2017-09-19

## 2017-09-19 NOTE — TELEPHONE ENCOUNTER
From: Max Alberto  To: Tim Serna M.D.  Sent: 9/19/2017 3:58 PM PDT  Subject: Non-Urgent Medical Question    Didn't have my eyeglasses on when I messaged earlier. Meant arthritis not artificial.     ----- Message -----  From: Tim Serna M.D.  Sent: 9/19/17, 10:49 AM  To: Max Alberto  Subject: RE: Non-Urgent Medical Question    Try taking tylenol 500 mg, 2 tablets, up to 3 times daily.    Dr. Serna        ----- Message -----   From: Max Alberto   Sent: 9/19/2017 10:34 AM PDT   To: Tim Serna M.D.  Subject: Non-Urgent Medical Question    Currently in [facility]. Am having right knee pain. Think could be artificial. What would be the best to take.

## 2017-11-28 ENCOUNTER — HOSPITAL ENCOUNTER (OUTPATIENT)
Dept: RADIOLOGY | Facility: MEDICAL CENTER | Age: 62
End: 2017-11-28
Attending: NURSE PRACTITIONER
Payer: MEDICARE

## 2017-11-28 ENCOUNTER — HOSPITAL ENCOUNTER (OUTPATIENT)
Dept: LAB | Facility: MEDICAL CENTER | Age: 62
End: 2017-11-28
Attending: NURSE PRACTITIONER
Payer: MEDICARE

## 2017-11-28 ENCOUNTER — TELEPHONE (OUTPATIENT)
Dept: MEDICAL GROUP | Facility: MEDICAL CENTER | Age: 62
End: 2017-11-28

## 2017-11-28 ENCOUNTER — OFFICE VISIT (OUTPATIENT)
Dept: MEDICAL GROUP | Facility: MEDICAL CENTER | Age: 62
End: 2017-11-28
Payer: MEDICARE

## 2017-11-28 VITALS
DIASTOLIC BLOOD PRESSURE: 80 MMHG | HEIGHT: 71 IN | BODY MASS INDEX: 38.64 KG/M2 | OXYGEN SATURATION: 97 % | SYSTOLIC BLOOD PRESSURE: 118 MMHG | WEIGHT: 276 LBS | TEMPERATURE: 96.9 F | HEART RATE: 96 BPM

## 2017-11-28 DIAGNOSIS — M25.561 CHRONIC PAIN OF RIGHT KNEE: ICD-10-CM

## 2017-11-28 DIAGNOSIS — M79.675 PAIN IN LEFT TOE(S): ICD-10-CM

## 2017-11-28 DIAGNOSIS — L21.9 DERMATITIS, SEBORRHEIC: ICD-10-CM

## 2017-11-28 DIAGNOSIS — G89.29 CHRONIC PAIN OF RIGHT KNEE: ICD-10-CM

## 2017-11-28 LAB
BASOPHILS # BLD AUTO: 0.3 % (ref 0–1.8)
BASOPHILS # BLD: 0.02 K/UL (ref 0–0.12)
EOSINOPHIL # BLD AUTO: 0.17 K/UL (ref 0–0.51)
EOSINOPHIL NFR BLD: 2.2 % (ref 0–6.9)
ERYTHROCYTE [DISTWIDTH] IN BLOOD BY AUTOMATED COUNT: 43.6 FL (ref 35.9–50)
HCT VFR BLD AUTO: 49.7 % (ref 42–52)
HGB BLD-MCNC: 17.5 G/DL (ref 14–18)
IMM GRANULOCYTES # BLD AUTO: 0.01 K/UL (ref 0–0.11)
IMM GRANULOCYTES NFR BLD AUTO: 0.1 % (ref 0–0.9)
LYMPHOCYTES # BLD AUTO: 1.62 K/UL (ref 1–4.8)
LYMPHOCYTES NFR BLD: 20.7 % (ref 22–41)
MCH RBC QN AUTO: 32.9 PG (ref 27–33)
MCHC RBC AUTO-ENTMCNC: 35.2 G/DL (ref 33.7–35.3)
MCV RBC AUTO: 93.4 FL (ref 81.4–97.8)
MONOCYTES # BLD AUTO: 0.62 K/UL (ref 0–0.85)
MONOCYTES NFR BLD AUTO: 7.9 % (ref 0–13.4)
NEUTROPHILS # BLD AUTO: 5.4 K/UL (ref 1.82–7.42)
NEUTROPHILS NFR BLD: 68.8 % (ref 44–72)
NRBC # BLD AUTO: 0 K/UL
NRBC BLD AUTO-RTO: 0 /100 WBC
PLATELET # BLD AUTO: 161 K/UL (ref 164–446)
PMV BLD AUTO: 10.8 FL (ref 9–12.9)
RBC # BLD AUTO: 5.32 M/UL (ref 4.7–6.1)
URATE SERPL-MCNC: 9.2 MG/DL (ref 2.5–8.3)
WBC # BLD AUTO: 7.8 K/UL (ref 4.8–10.8)

## 2017-11-28 PROCEDURE — 84550 ASSAY OF BLOOD/URIC ACID: CPT

## 2017-11-28 PROCEDURE — 73564 X-RAY EXAM KNEE 4 OR MORE: CPT | Mod: RT

## 2017-11-28 PROCEDURE — 36415 COLL VENOUS BLD VENIPUNCTURE: CPT

## 2017-11-28 PROCEDURE — 99214 OFFICE O/P EST MOD 30 MIN: CPT | Performed by: NURSE PRACTITIONER

## 2017-11-28 PROCEDURE — 73630 X-RAY EXAM OF FOOT: CPT | Mod: LT

## 2017-11-28 PROCEDURE — 85025 COMPLETE CBC W/AUTO DIFF WBC: CPT

## 2017-11-28 RX ORDER — COLCHICINE 0.6 MG/1
0.6 TABLET ORAL DAILY
Qty: 3 TAB | Refills: 0 | Status: SHIPPED | OUTPATIENT
Start: 2017-11-28 | End: 2018-07-17

## 2017-11-28 RX ORDER — KETOCONAZOLE 20 MG/ML
SHAMPOO TOPICAL
Qty: 1 BOTTLE | Refills: 3 | Status: SHIPPED | OUTPATIENT
Start: 2017-11-28 | End: 2018-12-13 | Stop reason: SDUPTHER

## 2017-11-28 NOTE — PROGRESS NOTES
Subjective:     Max Alberto is a 62 y.o. male who presents with   Chief Complaint   Patient presents with   • Knee Pain   • Toe Pain   .    HPI:   Seen in f/u for rt leg and knee pain.  Pain started in sept.  He just woke up one day and there was pain.  There was pain all over the knee.  He used percocet.  He saw CLARISA in the past for his left knee.  He used a compound cream on it that he had left over.  The pain got better with the cream.   He then went on a long driving trip.  The pain got better and worse.    He has used a lot of aleve.   He has been back from his trip for a month.  He is having pain about 2 days a month.  Using aleve.     Today the pain is sl worse.    His rt leg is always swollen.  Unchanged.   2 days ago he started having pain in his left 2nd and 3rd toes.  There is a red spot on distal foot at 3rd toe area.  Tender in area.  No known injury but he has new dogs at home that may have stepped on it.    He needs refill of shampoo for itching scalp.  Derm med is too expensive.        Patient Active Problem List    Diagnosis Date Noted   • History of gastric surgery 07/18/2017   • Incisional hernia, without obstruction or gangrene 07/18/2017   • Chronic kidney disease (CKD), stage III (moderate) 01/12/2017   • Chronic pain of left knee 08/05/2015   • Chronic lower back pain 06/23/2015   • Dermatitis, seborrheic 06/23/2015   • Obesity (BMI 30-39.9) 06/23/2015   • Hx of total knee replacement 04/30/2015   • Tinnitus of both ears 10/13/2014   • Chronic venous insufficiency 09/04/2014   • HTN (hypertension) 09/04/2014   • Migraines 09/04/2014   • GERD (gastroesophageal reflux disease) 09/04/2014   • Seasonal allergies 09/04/2014   • Sleep apnea with use of continuous positive airway pressure (CPAP) 09/04/2014       Current medicines (including changes today)  Current Outpatient Prescriptions   Medication Sig Dispense Refill   • ketoconazole (NIZORAL) 2 % shampoo 1 application twice daily 1  "Bottle 3   • omeprazole (PRILOSEC) 20 MG delayed-release capsule TAKE ONE CAPSULE BY MOUTH EVERY DAY 90 Cap 3   • oxycodone-acetaminophen (PERCOCET-10)  MG Tab Take 1 Tab by mouth 3 times a day as needed for Severe Pain. 90 Tab 0   • triamterene-hctz (MAXZIDE-25/DYAZIDE) 37.5-25 MG Tab TAKE 1 TABLET BY MOUTH EVERY DAY 90 Tab 3   • Sulfacetamide Sodium 10 % Shampoo 1 application once daily     • vitamin D (CHOLECALCIFEROL) 1000 UNIT Tab Take 2,000 Units by mouth every day.     • sumatriptan (IMITREX) 100 MG tablet Take 1 Tab by mouth Once PRN. migraine 10 Tab 2     No current facility-administered medications for this visit.        Allergies   Allergen Reactions   • Vicodin [Hydrocodone-Acetaminophen] Rash     On face only-bridge of nose       ROS  Constitutional: Negative. Negative for fever, chills, weight loss, malaise/fatigue and diaphoresis.   HENT: Negative. Negative for hearing loss, ear pain, nosebleeds, congestion, sore throat, neck pain, tinnitus and ear discharge.   Respiratory: Negative. Negative for cough, hemoptysis, sputum production, shortness of breath, wheezing and stridor.   Cardiovascular: Negative. Negative for chest pain, palpitations, orthopnea, claudication, PND.   Gastrointestinal: Denies nausea, vomiting, diarrhea, constipation, heartburn, melena or hematochezia.  Genitourinary: Denies dysuria, hematuria, urinary incontinence, frequency or urgency.        Objective:     Blood pressure 118/80, pulse 96, temperature 36.1 °C (96.9 °F), height 1.803 m (5' 11\"), weight (!) 125.2 kg (276 lb), SpO2 97 %. Body mass index is 38.49 kg/m².    Physical Exam:  Vitals reviewed.  Constitutional: Oriented to person, place, and time. appears well-developed and well-nourished. No distress.   Cardiovascular: Normal rate, regular rhythm, normal heart sounds and intact distal pulses. Exam reveals no gallop and no friction rub. No murmur heard. No carotid bruits.   Pulmonary/Chest: Effort normal and breath " sounds normal. No stridor. No respiratory distress. no wheezes or rales. exhibits no tenderness.   Musculoskeletal: pain left toes and rt knee with range of motion. exhibits chronic rt lower leg 2+ edema. opal pedal pulses 2+.    Neurological: Alert and oriented to person, place, and time. exhibits normal muscle tone.  Skin: Skin is warm and dry. No diaphoresis.   Psychiatric: Normal mood and affect. Behavior is normal.      Assessment and Plan:     The following treatment plan was discussed:    1. Chronic pain of right knee  DX-KNEE COMPLETE 4+ RIGHT    CBC WITH DIFFERENTIAL    URIC ACID    xray rt knee.  do lab with cbc and uric acid.  dif dx is cellulitis vs gout vs injury   2. Pain in left toe(s)  DX-FOOT-COMPLETE 3+ LEFT    CBC WITH DIFFERENTIAL    URIC ACID    xray foot.  do lab.  f/u with tp with all test results.    3. Dermatitis, seborrheic  ketoconazole (NIZORAL) 2 % shampoo    refill ketoconazole shampoo.  it is helping         Followup: Return if symptoms worsen or fail to improve.

## 2017-11-29 NOTE — TELEPHONE ENCOUNTER
Please let pt know that the knee xray shows mild OA with swelling.  The foot xray shows mild OA.  We can do MRI on knee when he is ready.  Let me know to order.

## 2017-11-29 NOTE — TELEPHONE ENCOUNTER
Please let pt know that the cbc is wnl except his plts are low.  That is chronic.  His uric acid is high.  prob some of his sx are d/t gout.  i will send in presc for colchicine.  See if that helps his sx. If yes then no further w/u needed.  If no change then will need the knee MRI.

## 2017-12-12 DIAGNOSIS — M25.561 ACUTE PAIN OF RIGHT KNEE: ICD-10-CM

## 2017-12-16 ENCOUNTER — HOSPITAL ENCOUNTER (OUTPATIENT)
Dept: RADIOLOGY | Facility: MEDICAL CENTER | Age: 62
End: 2017-12-16
Attending: NURSE PRACTITIONER
Payer: MEDICARE

## 2017-12-16 DIAGNOSIS — M25.561 ACUTE PAIN OF RIGHT KNEE: ICD-10-CM

## 2017-12-16 PROCEDURE — 73721 MRI JNT OF LWR EXTRE W/O DYE: CPT | Mod: RT

## 2017-12-18 ENCOUNTER — TELEPHONE (OUTPATIENT)
Dept: MEDICAL GROUP | Facility: MEDICAL CENTER | Age: 62
End: 2017-12-18

## 2017-12-18 DIAGNOSIS — M25.561 CHRONIC PAIN OF RIGHT KNEE: ICD-10-CM

## 2017-12-18 DIAGNOSIS — G89.29 CHRONIC PAIN OF RIGHT KNEE: ICD-10-CM

## 2017-12-19 NOTE — TELEPHONE ENCOUNTER
Please let pt know that the MRI knee shows multiple tears in ligaments, a bakers cyst, a cartilage lesion.    Needs to see ortho.  i will refer if he is ok with that.

## 2018-06-06 NOTE — PROGRESS NOTES
Outcome: Left Message    Please transfer to Patient Outreach Team at [phone] when patient returns call.    [facility] Verified: yes    Attempt # 1

## 2018-06-11 NOTE — PROGRESS NOTES
DID NOT SCHEDULE AWV, PT WILL BE HAVING KNEE SURGERY AND NEEDS DEBI WITH PCP FIRST. WILL DISCUSS AWV AT THIS DEBI

## 2018-06-12 ENCOUNTER — TELEPHONE (OUTPATIENT)
Dept: MEDICAL GROUP | Facility: MEDICAL CENTER | Age: 63
End: 2018-06-12

## 2018-06-12 ENCOUNTER — PATIENT MESSAGE (OUTPATIENT)
Dept: MEDICAL GROUP | Facility: MEDICAL CENTER | Age: 63
End: 2018-06-12

## 2018-06-12 DIAGNOSIS — I10 ESSENTIAL HYPERTENSION: ICD-10-CM

## 2018-06-12 RX ORDER — AMOXICILLIN 500 MG/1
2000 CAPSULE ORAL
Qty: 16 CAP | Refills: 0 | Status: SHIPPED | OUTPATIENT
Start: 2018-06-12 | End: 2019-06-18 | Stop reason: SDUPTHER

## 2018-06-12 NOTE — TELEPHONE ENCOUNTER
1. Caller Name: Pt                                         Call Back Number: [phone] (home)         Patient approves a detailed voicemail message: yes  Patient approves a detailed my chart message: yes    Pt is requesting labs before his next appt for Pre op surgical clearance

## 2018-06-12 NOTE — TELEPHONE ENCOUNTER
From: Max Alberto  To: Alisia Burris Med Ass't  Sent: 6/12/2018 3:51 PM PDT  Subject: RE:Fasting labs    I just remembered. I have a couple of dentist appts coming up and I need a refill of Amoxicillin 500mg capsules. Dr Serna usually gives me a 16 capsule refill.    Max  ----- Message -----  From: Alisia Burris Med Ass't  Sent: 6/12/2018 1:48 PM PDT  To: Max Alberto  Subject: Fasting labs  Javier Santana,    Fasting labs ordered.    Have a good day!    Alisia  Medical Assistant

## 2018-06-20 RX ORDER — TRIAMTERENE AND HYDROCHLOROTHIAZIDE 37.5; 25 MG/1; MG/1
TABLET ORAL
Qty: 90 TAB | Refills: 3 | Status: SHIPPED | OUTPATIENT
Start: 2018-06-20 | End: 2019-06-07 | Stop reason: SDUPTHER

## 2018-06-28 RX ORDER — OMEPRAZOLE 20 MG/1
CAPSULE, DELAYED RELEASE ORAL
Qty: 90 CAP | Refills: 3 | Status: SHIPPED | OUTPATIENT
Start: 2018-06-28 | End: 2019-06-07 | Stop reason: SDUPTHER

## 2018-07-09 ENCOUNTER — HOSPITAL ENCOUNTER (OUTPATIENT)
Dept: LAB | Facility: MEDICAL CENTER | Age: 63
End: 2018-07-09
Attending: FAMILY MEDICINE
Payer: MEDICARE

## 2018-07-09 DIAGNOSIS — I10 ESSENTIAL HYPERTENSION: ICD-10-CM

## 2018-07-09 LAB
ALBUMIN SERPL BCP-MCNC: 4.6 G/DL (ref 3.2–4.9)
ALBUMIN/GLOB SERPL: 1.6 G/DL
ALP SERPL-CCNC: 47 U/L (ref 30–99)
ALT SERPL-CCNC: 18 U/L (ref 2–50)
ANION GAP SERPL CALC-SCNC: 9 MMOL/L (ref 0–11.9)
AST SERPL-CCNC: 16 U/L (ref 12–45)
BASOPHILS # BLD AUTO: 0.5 % (ref 0–1.8)
BASOPHILS # BLD: 0.03 K/UL (ref 0–0.12)
BILIRUB SERPL-MCNC: 2.1 MG/DL (ref 0.1–1.5)
BUN SERPL-MCNC: 31 MG/DL (ref 8–22)
CALCIUM SERPL-MCNC: 9.8 MG/DL (ref 8.5–10.5)
CHLORIDE SERPL-SCNC: 105 MMOL/L (ref 96–112)
CHOLEST SERPL-MCNC: 136 MG/DL (ref 100–199)
CO2 SERPL-SCNC: 30 MMOL/L (ref 20–33)
CREAT SERPL-MCNC: 1.47 MG/DL (ref 0.5–1.4)
EOSINOPHIL # BLD AUTO: 0.31 K/UL (ref 0–0.51)
EOSINOPHIL NFR BLD: 4.9 % (ref 0–6.9)
ERYTHROCYTE [DISTWIDTH] IN BLOOD BY AUTOMATED COUNT: 46.5 FL (ref 35.9–50)
GLOBULIN SER CALC-MCNC: 2.9 G/DL (ref 1.9–3.5)
GLUCOSE SERPL-MCNC: 108 MG/DL (ref 65–99)
HCT VFR BLD AUTO: 51.5 % (ref 42–52)
HDLC SERPL-MCNC: 50 MG/DL
HGB BLD-MCNC: 17.8 G/DL (ref 14–18)
IMM GRANULOCYTES # BLD AUTO: 0.01 K/UL (ref 0–0.11)
IMM GRANULOCYTES NFR BLD AUTO: 0.2 % (ref 0–0.9)
LDLC SERPL CALC-MCNC: 59 MG/DL
LYMPHOCYTES # BLD AUTO: 1.55 K/UL (ref 1–4.8)
LYMPHOCYTES NFR BLD: 24.5 % (ref 22–41)
MCH RBC QN AUTO: 33.4 PG (ref 27–33)
MCHC RBC AUTO-ENTMCNC: 34.6 G/DL (ref 33.7–35.3)
MCV RBC AUTO: 96.6 FL (ref 81.4–97.8)
MONOCYTES # BLD AUTO: 0.6 K/UL (ref 0–0.85)
MONOCYTES NFR BLD AUTO: 9.5 % (ref 0–13.4)
NEUTROPHILS # BLD AUTO: 3.82 K/UL (ref 1.82–7.42)
NEUTROPHILS NFR BLD: 60.4 % (ref 44–72)
NRBC # BLD AUTO: 0 K/UL
NRBC BLD-RTO: 0 /100 WBC
PLATELET # BLD AUTO: 165 K/UL (ref 164–446)
PMV BLD AUTO: 10.6 FL (ref 9–12.9)
POTASSIUM SERPL-SCNC: 4.6 MMOL/L (ref 3.6–5.5)
PROT SERPL-MCNC: 7.5 G/DL (ref 6–8.2)
RBC # BLD AUTO: 5.33 M/UL (ref 4.7–6.1)
SODIUM SERPL-SCNC: 144 MMOL/L (ref 135–145)
TRIGL SERPL-MCNC: 134 MG/DL (ref 0–149)
TSH SERPL DL<=0.005 MIU/L-ACNC: 1.46 UIU/ML (ref 0.38–5.33)
WBC # BLD AUTO: 6.3 K/UL (ref 4.8–10.8)

## 2018-07-09 PROCEDURE — 36415 COLL VENOUS BLD VENIPUNCTURE: CPT

## 2018-07-09 PROCEDURE — 80061 LIPID PANEL: CPT

## 2018-07-09 PROCEDURE — 80053 COMPREHEN METABOLIC PANEL: CPT

## 2018-07-09 PROCEDURE — 84443 ASSAY THYROID STIM HORMONE: CPT

## 2018-07-09 PROCEDURE — 85025 COMPLETE CBC W/AUTO DIFF WBC: CPT

## 2018-07-17 ENCOUNTER — TELEPHONE (OUTPATIENT)
Dept: MEDICAL GROUP | Facility: MEDICAL CENTER | Age: 63
End: 2018-07-17

## 2018-07-17 ENCOUNTER — OFFICE VISIT (OUTPATIENT)
Dept: MEDICAL GROUP | Facility: MEDICAL CENTER | Age: 63
End: 2018-07-17
Payer: MEDICARE

## 2018-07-17 VITALS
TEMPERATURE: 96.9 F | DIASTOLIC BLOOD PRESSURE: 74 MMHG | WEIGHT: 284.8 LBS | OXYGEN SATURATION: 95 % | HEIGHT: 71 IN | BODY MASS INDEX: 39.87 KG/M2 | SYSTOLIC BLOOD PRESSURE: 118 MMHG | HEART RATE: 93 BPM

## 2018-07-17 DIAGNOSIS — K43.2 INCISIONAL HERNIA, WITHOUT OBSTRUCTION OR GANGRENE: ICD-10-CM

## 2018-07-17 DIAGNOSIS — M1A.09X1 IDIOPATHIC CHRONIC GOUT OF MULTIPLE SITES WITH TOPHUS: ICD-10-CM

## 2018-07-17 DIAGNOSIS — Z12.11 COLON CANCER SCREENING: ICD-10-CM

## 2018-07-17 DIAGNOSIS — E66.9 OBESITY (BMI 30-39.9): ICD-10-CM

## 2018-07-17 DIAGNOSIS — I10 ESSENTIAL HYPERTENSION: ICD-10-CM

## 2018-07-17 DIAGNOSIS — N18.30 CHRONIC KIDNEY DISEASE (CKD), STAGE III (MODERATE) (HCC): ICD-10-CM

## 2018-07-17 DIAGNOSIS — M25.561 CHRONIC PAIN OF RIGHT KNEE: ICD-10-CM

## 2018-07-17 DIAGNOSIS — G89.29 CHRONIC PAIN OF RIGHT KNEE: ICD-10-CM

## 2018-07-17 PROCEDURE — 93000 ELECTROCARDIOGRAM COMPLETE: CPT | Performed by: FAMILY MEDICINE

## 2018-07-17 PROCEDURE — 99214 OFFICE O/P EST MOD 30 MIN: CPT | Performed by: FAMILY MEDICINE

## 2018-07-17 RX ORDER — ALLOPURINOL 300 MG/1
300 TABLET ORAL DAILY
Qty: 90 TAB | Refills: 3 | Status: SHIPPED | OUTPATIENT
Start: 2018-07-17 | End: 2019-06-18 | Stop reason: SDUPTHER

## 2018-07-17 ASSESSMENT — PATIENT HEALTH QUESTIONNAIRE - PHQ9: CLINICAL INTERPRETATION OF PHQ2 SCORE: 0

## 2018-07-17 NOTE — ASSESSMENT & PLAN NOTE
Patient had a flareup of knee pain in September, which was severe and lasted several days.  His knee eventually calm down.  He had an x-ray of his knee in November which showed mild osteoarthritis.  He has history of left knee replacement.  Orthopedic has suggested he might need right knee replacement.  He has history of gout which was untreated.  He has various tophus on fingers.

## 2018-07-17 NOTE — ASSESSMENT & PLAN NOTE
Patient had a gout attack and was treated with colchicine in November.  He has not been on prevention medication.  He states he has nodules in fingers that come and go.

## 2018-07-17 NOTE — PROGRESS NOTES
Subjective:   Max Alberto is a 63 y.o. male here today for right knee pain    Chronic pain of right knee  Patient had a flareup of knee pain in September, which was severe and lasted several days.  His knee eventually calm down.  He had an x-ray of his knee in November which showed mild osteoarthritis.  He has history of left knee replacement.  Orthopedic has suggested he might need right knee replacement.  He has history of gout which was untreated.  He has various tophus on fingers.    Chronic kidney disease (CKD), stage III (moderate)  Patient had recent lab work done which shows a stable GFR in the high 40s.    HTN (hypertension)  Patient is tolerating Maxide.    Idiopathic chronic gout of multiple sites with tophus  Patient had a gout attack and was treated with colchicine in November.  He has not been on prevention medication.  He states he has nodules in fingers that come and go.    Incisional hernia, without obstruction or gangrene  Patient has a bulging hernia over gastric sleeve incision site.  He states that the bulging occurs now every other week instead of once a month.  When bulging occurs it is painful.    Obesity (BMI 30-39.9)  Patient has not been eating a healthy diet recently and as result his weight has gone up.         Current medicines (including changes today)  Current Outpatient Prescriptions   Medication Sig Dispense Refill   • allopurinol (ZYLOPRIM) 300 MG Tab Take 1 Tab by mouth every day. 90 Tab 3   • omeprazole (PRILOSEC) 20 MG delayed-release capsule TAKE ONE CAPSULE BY MOUTH EVERY DAY 90 Cap 3   • triamterene-hctz (MAXZIDE-25/DYAZIDE) 37.5-25 MG Tab TAKE 1 TABLET BY MOUTH EVERY DAY 90 Tab 3   • ketoconazole (NIZORAL) 2 % shampoo 1 application twice daily 1 Bottle 3   • oxycodone-acetaminophen (PERCOCET-10)  MG Tab Take 1 Tab by mouth 3 times a day as needed for Severe Pain. 90 Tab 0   • Sulfacetamide Sodium 10 % Shampoo 1 application once daily     • vitamin D  "(CHOLECALCIFEROL) 1000 UNIT Tab Take 2,000 Units by mouth every day.     • sumatriptan (IMITREX) 100 MG tablet Take 1 Tab by mouth Once PRN. migraine 10 Tab 2     No current facility-administered medications for this visit.      He  has a past medical history of Arthritis; Chronic venous insufficiency (9/4/2014); Dental disorder (12/17/14); GERD (gastroesophageal reflux disease); Headache, classical migraine; Heart burn; Hypertension; Indigestion; MEDICAL HOME; Pain (12/17/14); Sleep apnea; and Snoring. He also has no past medical history of Anesthesia or Cold.    ROS   No chest pain, no shortness of breath, no abdominal pain       Objective:     Blood pressure 118/74, pulse 93, temperature 36.1 °C (96.9 °F), height 1.803 m (5' 11\"), weight (!) 129.2 kg (284 lb 12.8 oz), SpO2 95 %. Body mass index is 39.72 kg/m².   Physical Exam:  Constitutional: Alert, no distress.  Skin: Warm, dry, good turgor, no rashes in visible areas.  Eye: Equal, round and reactive, conjunctiva clear, lids normal.  Psych: Alert and oriented x3, normal affect and mood.  Fingers: multiple tophus on DIP and PIP joints.    EKG Interpretation-HR is 74 normal EKG, normal sinus rhythm           Assessment and Plan:   The following treatment plan was discussed    1. Chronic pain of right knee  Mild osteoarthritis.  Patient may have had a gout flare.  Will treat with allopurinol and monitor.  Advised patient that his x-rays do not appear that he absolutely needs knee surgery at this time.  Advised patient to monitor if he can.    2. Idiopathic chronic gout of multiple sites with tophus  Start patient on allopurinol 300 mg daily.    3. Incisional hernia, without obstruction or gangrene  Referral to general surgery for evaluation and possible treatment.  - REFERRAL TO GENERAL SURGERY    4. Chronic kidney disease (CKD), stage III (moderate)  Stable.  Continue to monitor.    5. Essential hypertension  Controlled.  EKG with normal sinus rhythm.  - EKG - " Clinic Performed    6. Obesity (BMI 30-39.9)  - Patient identified as having weight management issue.  Appropriate orders and counseling given.    7. Colon cancer screening  Referral to GI.  - REFERRAL TO GASTROENTEROLOGY      Followup: Return if symptoms worsen or fail to improve, for Annual.

## 2018-07-17 NOTE — ASSESSMENT & PLAN NOTE
Patient has a bulging hernia over gastric sleeve incision site.  He states that the bulging occurs now every other week instead of once a month.  When bulging occurs it is painful.

## 2018-07-17 NOTE — TELEPHONE ENCOUNTER
1. Caller Name: Pt                                         Call Back Number: [phone] (home)         Patient approves a detailed voicemail message: N\A    Left message for patient to call office     Please transfer pt to [phone]

## 2018-07-18 ENCOUNTER — TELEPHONE (OUTPATIENT)
Dept: MEDICAL GROUP | Facility: MEDICAL CENTER | Age: 63
End: 2018-07-18

## 2018-07-18 DIAGNOSIS — K43.2 INCISIONAL HERNIA, WITHOUT OBSTRUCTION OR GANGRENE: ICD-10-CM

## 2018-07-18 NOTE — TELEPHONE ENCOUNTER
Left message for patient to call back regarding AWV appt. Please transfer call to [phone].

## 2018-07-18 NOTE — TELEPHONE ENCOUNTER
1. Caller Name: Pt                                         Call Back Number: [phone] (home)         Patient approves a detailed voicemail message: N\A    2. SPECIFIC Action To Be Taken: Referral update requested    3. Diagnosis/Clinical Reason for Request: K43.2 (ICD-10-CM) - Incisional hernia, without obstruction or gangrene.  Pt states that he has had issues with [facility] in the past and feels more comfortable with Dr. Julian Carmen    4. Specialty & Provider Name/Lab/Imaging Location: [facility]/Julian Carmen MD    5. Is appointment scheduled for requested order/referral: no    Patient was informed they will receive a return phone call from the office ONLY if there are any questions before processing their request. Advised to call back if they haven't received a call from the referral department in 5 days.

## 2018-08-16 ENCOUNTER — TELEPHONE (OUTPATIENT)
Dept: MEDICAL GROUP | Facility: MEDICAL CENTER | Age: 63
End: 2018-08-16

## 2018-08-16 NOTE — TELEPHONE ENCOUNTER
ANNUAL WELLNESS VISIT PRE-VISIT PLANNING WITHOUT OUTREACH    1.  Reviewed note from last office visit with PCP: YES    2.  If any orders were placed at last visit, do we have Results/Consult Notes?        •  Labs - Labs were not ordered at last office visit.  Note: If patient appointment is for lab review and patient did not complete labs, check with provider if OK to reschedule patient until labs completed.       •  Imaging - Imaging was not ordered at last office visit.       •  Referrals - Referral ordered, patient has NOT been seen.    3.  Immunizations were updated in Epic using WebIZ?: Epic matches WebIZ       •  WebIZ Recommendations: FLU, TD and SHINGRIX (Shingles)        •  Is patient due for Tdap? NO       •  Is patient due for Shingles? YES. Patient was notified of copay/out of pocket cost.     4.  Patient is due for the following Health Maintenance Topics:   Health Maintenance Due   Topic Date Due   • IMM ZOSTER VACCINES (2 of 3) 09/22/2016   • Annual Wellness Visit  07/19/2018     5.  Reviewed/Updated the following with patient:       •   Preferred Pharmacy? YES       •   Preferred Lab? YES       •   Preferred Communication? YES       •   Allergies? YES       •   Medications? YES. Was Abstract Encounter opened and chart updated? YES       •   Social History? YES. Was Abstract Encounter opened and chart updated? YES       •   Family History (document living status of immediate family members and if + hx of  cancer, diabetes, hypertension, hyperlipidemia, heart attack, stroke) YES. Was Abstract Encounter opened and chart updated? YES    6.  Care Team Updated:       •   DME Company (gait device, O2, CPAP, etc.): NO       •   Other Specialists (eye doctor, derm, GYN, cardiology, endo, etc): NO    7.  Patient has the following Care Path diagnoses on Problem List:  NONE    8.  MDX printed and highlighted for Provider? YES    9.  Patient was advised: “This is a free wellness visit. The provider will screen for  medical conditions to help you stay healthy. If you have other concerns to address you may be asked to discuss these at a separate visit or there may be an additional fee.”     10.  Patient was informed to arrive 15 min prior to their scheduled appointment and bring in their medication bottles.

## 2018-08-22 ENCOUNTER — OFFICE VISIT (OUTPATIENT)
Dept: MEDICAL GROUP | Facility: MEDICAL CENTER | Age: 63
End: 2018-08-22
Payer: MEDICARE

## 2018-08-22 VITALS
WEIGHT: 278 LBS | BODY MASS INDEX: 38.92 KG/M2 | HEIGHT: 71 IN | OXYGEN SATURATION: 97 % | DIASTOLIC BLOOD PRESSURE: 84 MMHG | TEMPERATURE: 98 F | HEART RATE: 67 BPM | SYSTOLIC BLOOD PRESSURE: 136 MMHG

## 2018-08-22 DIAGNOSIS — Z23 NEED FOR VACCINATION: ICD-10-CM

## 2018-08-22 DIAGNOSIS — M54.50 CHRONIC MIDLINE LOW BACK PAIN WITHOUT SCIATICA: ICD-10-CM

## 2018-08-22 DIAGNOSIS — Z00.00 MEDICARE ANNUAL WELLNESS VISIT, SUBSEQUENT: ICD-10-CM

## 2018-08-22 DIAGNOSIS — I10 ESSENTIAL HYPERTENSION: ICD-10-CM

## 2018-08-22 DIAGNOSIS — G89.29 CHRONIC PAIN OF RIGHT KNEE: ICD-10-CM

## 2018-08-22 DIAGNOSIS — M1A.09X1 IDIOPATHIC CHRONIC GOUT OF MULTIPLE SITES WITH TOPHUS: ICD-10-CM

## 2018-08-22 DIAGNOSIS — R73.01 ELEVATED FASTING BLOOD SUGAR: ICD-10-CM

## 2018-08-22 DIAGNOSIS — I87.2 CHRONIC VENOUS INSUFFICIENCY: ICD-10-CM

## 2018-08-22 DIAGNOSIS — M79.645 CHRONIC PAIN OF LEFT THUMB: ICD-10-CM

## 2018-08-22 DIAGNOSIS — K43.2 INCISIONAL HERNIA, WITHOUT OBSTRUCTION OR GANGRENE: ICD-10-CM

## 2018-08-22 DIAGNOSIS — L21.9 DERMATITIS, SEBORRHEIC: ICD-10-CM

## 2018-08-22 DIAGNOSIS — N18.30 CHRONIC KIDNEY DISEASE (CKD), STAGE III (MODERATE) (HCC): ICD-10-CM

## 2018-08-22 DIAGNOSIS — G89.29 CHRONIC PAIN OF LEFT THUMB: ICD-10-CM

## 2018-08-22 DIAGNOSIS — M25.561 CHRONIC PAIN OF RIGHT KNEE: ICD-10-CM

## 2018-08-22 DIAGNOSIS — Z12.5 PROSTATE CANCER SCREENING: ICD-10-CM

## 2018-08-22 DIAGNOSIS — K21.9 GASTROESOPHAGEAL REFLUX DISEASE, ESOPHAGITIS PRESENCE NOT SPECIFIED: ICD-10-CM

## 2018-08-22 DIAGNOSIS — Z96.652 HISTORY OF TOTAL LEFT KNEE REPLACEMENT: ICD-10-CM

## 2018-08-22 DIAGNOSIS — G47.30 SLEEP APNEA WITH USE OF CONTINUOUS POSITIVE AIRWAY PRESSURE (CPAP): ICD-10-CM

## 2018-08-22 DIAGNOSIS — J30.1 SEASONAL ALLERGIC RHINITIS DUE TO POLLEN: ICD-10-CM

## 2018-08-22 DIAGNOSIS — G89.29 CHRONIC MIDLINE LOW BACK PAIN WITHOUT SCIATICA: ICD-10-CM

## 2018-08-22 DIAGNOSIS — H93.13 TINNITUS OF BOTH EARS: ICD-10-CM

## 2018-08-22 DIAGNOSIS — G43.909 MIGRAINE WITHOUT STATUS MIGRAINOSUS, NOT INTRACTABLE, UNSPECIFIED MIGRAINE TYPE: ICD-10-CM

## 2018-08-22 DIAGNOSIS — E66.9 OBESITY (BMI 30-39.9): ICD-10-CM

## 2018-08-22 DIAGNOSIS — Z98.890 HISTORY OF GASTRIC SURGERY: ICD-10-CM

## 2018-08-22 PROBLEM — M79.644 CHRONIC PAIN OF RIGHT THUMB: Status: ACTIVE | Noted: 2018-08-22

## 2018-08-22 PROCEDURE — G0439 PPPS, SUBSEQ VISIT: HCPCS | Performed by: FAMILY MEDICINE

## 2018-08-22 ASSESSMENT — ACTIVITIES OF DAILY LIVING (ADL): BATHING_REQUIRES_ASSISTANCE: 0

## 2018-08-22 ASSESSMENT — PAIN SCALES - GENERAL: PAINLEVEL: 3=SLIGHT PAIN

## 2018-08-22 ASSESSMENT — PATIENT HEALTH QUESTIONNAIRE - PHQ9: CLINICAL INTERPRETATION OF PHQ2 SCORE: 0

## 2018-08-22 ASSESSMENT — ENCOUNTER SYMPTOMS: GENERAL WELL-BEING: FAIR

## 2018-08-22 NOTE — PROGRESS NOTES
Chief Complaint   Patient presents with   • Annual Wellness Visit         HPI:  Montse is a 63 y.o. here for Medicare Annual Wellness Visit    Complaining of left thumb pain.    Results for MONTSE SHEPPARD (MRN [id number]) as of 8/22/2018 08:52   Ref. Range 7/9/2018 06:09   WBC Latest Ref Range: 4.8 - 10.8 K/uL 6.3   RBC Latest Ref Range: 4.70 - 6.10 M/uL 5.33   Hemoglobin Latest Ref Range: 14.0 - 18.0 g/dL 17.8   Hematocrit Latest Ref Range: 42.0 - 52.0 % 51.5   MCV Latest Ref Range: 81.4 - 97.8 fL 96.6   MCH Latest Ref Range: 27.0 - 33.0 pg 33.4 (H)   MCHC Latest Ref Range: 33.7 - 35.3 g/dL 34.6   RDW Latest Ref Range: 35.9 - 50.0 fL 46.5   Platelet Count Latest Ref Range: 164 - 446 K/uL 165   MPV Latest Ref Range: 9.0 - 12.9 fL 10.6   Neutrophils-Polys Latest Ref Range: 44.00 - 72.00 % 60.40   Neutrophils (Absolute) Latest Ref Range: 1.82 - 7.42 K/uL 3.82   Lymphocytes Latest Ref Range: 22.00 - 41.00 % 24.50   Lymphs (Absolute) Latest Ref Range: 1.00 - 4.80 K/uL 1.55   Monocytes Latest Ref Range: 0.00 - 13.40 % 9.50   Monos (Absolute) Latest Ref Range: 0.00 - 0.85 K/uL 0.60   Eosinophils Latest Ref Range: 0.00 - 6.90 % 4.90   Eos (Absolute) Latest Ref Range: 0.00 - 0.51 K/uL 0.31   Basophils Latest Ref Range: 0.00 - 1.80 % 0.50   Baso (Absolute) Latest Ref Range: 0.00 - 0.12 K/uL 0.03   Immature Granulocytes Latest Ref Range: 0.00 - 0.90 % 0.20   Immature Granulocytes (abs) Latest Ref Range: 0.00 - 0.11 K/uL 0.01   Nucleated RBC Latest Units: /100 WBC 0.00   NRBC (Absolute) Latest Units: K/uL 0.00   Sodium Latest Ref Range: 135 - 145 mmol/L 144   Potassium Latest Ref Range: 3.6 - 5.5 mmol/L 4.6   Chloride Latest Ref Range: 96 - 112 mmol/L 105   Co2 Latest Ref Range: 20 - 33 mmol/L 30   Anion Gap Latest Ref Range: 0.0 - 11.9  9.0   Glucose Latest Ref Range: 65 - 99 mg/dL 108 (H)   Bun Latest Ref Range: 8 - 22 mg/dL 31 (H)   Creatinine Latest Ref Range: 0.50 - 1.40 mg/dL 1.47 (H)   GFR If   Latest Ref Range: >60 mL/min/1.73 m 2 58 (A)   GFR If Non  Latest Ref Range: >60 mL/min/1.73 m 2 48 (A)   Calcium Latest Ref Range: 8.5 - 10.5 mg/dL 9.8   AST(SGOT) Latest Ref Range: 12 - 45 U/L 16   ALT(SGPT) Latest Ref Range: 2 - 50 U/L 18   Alkaline Phosphatase Latest Ref Range: 30 - 99 U/L 47   Total Bilirubin Latest Ref Range: 0.1 - 1.5 mg/dL 2.1 (H)   Albumin Latest Ref Range: 3.2 - 4.9 g/dL 4.6   Total Protein Latest Ref Range: 6.0 - 8.2 g/dL 7.5   Globulin Latest Ref Range: 1.9 - 3.5 g/dL 2.9   A-G Ratio Latest Units: g/dL 1.6   Cholesterol,Tot Latest Ref Range: 100 - 199 mg/dL 136   Triglycerides Latest Ref Range: 0 - 149 mg/dL 134   HDL Latest Ref Range: >=40 mg/dL 50   LDL Latest Ref Range: <100 mg/dL 59   TSH Latest Ref Range: 0.380 - 5.330 uIU/mL 1.460       Patient Active Problem List    Diagnosis Date Noted   • Chronic pain of left thumb 08/22/2018   • 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/2018   • History of gastric surgery 07/18/2017   • Incisional hernia, without obstruction or gangrene 07/18/2017   • Chronic kidney disease (CKD), stage III (moderate) 01/12/2017   • Chronic pain of right knee 08/05/2015   • Chronic lower back pain 06/23/2015   • Dermatitis, seborrheic 06/23/2015   • Obesity (BMI 30-39.9) 06/23/2015   • Hx of total knee replacement 04/30/2015   • Tinnitus of both ears 10/13/2014   • Chronic venous insufficiency 09/04/2014   • HTN (hypertension) 09/04/2014   • Migraines 09/04/2014   • GERD (gastroesophageal reflux disease) 09/04/2014   • Seasonal allergies 09/04/2014   • Sleep apnea with use of continuous positive airway pressure (CPAP) 09/04/2014       Current Outpatient Prescriptions   Medication Sig Dispense Refill   • Zoster Vac Recomb Adjuvanted (SHINGRIX) 50 MCG Recon Susp 0.5 mL by Intramuscular route Once for 1 dose. 0.5 mL 0   • Diclofenac Sodium 1 % Gel Apply 1 g to skin as directed 2 Times a Day. 100 g 2   • allopurinol (ZYLOPRIM) 300  MG Tab Take 1 Tab by mouth every day. 90 Tab 3   • omeprazole (PRILOSEC) 20 MG delayed-release capsule TAKE ONE CAPSULE BY MOUTH EVERY DAY 90 Cap 3   • triamterene-hctz (MAXZIDE-25/DYAZIDE) 37.5-25 MG Tab TAKE 1 TABLET BY MOUTH EVERY DAY 90 Tab 3   • ketoconazole (NIZORAL) 2 % shampoo 1 application twice daily 1 Bottle 3   • vitamin D (CHOLECALCIFEROL) 1000 UNIT Tab Take 2,000 Units by mouth every day.     • oxycodone-acetaminophen (PERCOCET-10)  MG Tab Take 1 Tab by mouth 3 times a day as needed for Severe Pain. 90 Tab 0   • Sulfacetamide Sodium 10 % Shampoo 1 application once daily     • sumatriptan (IMITREX) 100 MG tablet Take 1 Tab by mouth Once PRN. migraine 10 Tab 2     No current facility-administered medications for this visit.         Patient is taking medications as noted in medication list.  Current supplements as per medication list.     Allergies: Patient has no active allergies.    Current social contact/activities: Pt goes to see [employer]. Pt goes on the road and travels to [place] and other parts of [place]     Is patient current with immunizations? No, due for SHINGRIX (Shingles). Patient is interested in receiving NONE today.    He  reports that he quit smoking about 24 years ago. His smoking use included Cigarettes. He has a 25.00 pack-year smoking history. He has never used smokeless tobacco. He reports that he drinks about 0.5 oz of alcohol per week . He reports that he does not use drugs.  Counseling given: Not Answered        DPA/Advanced directive: Patient does not have an Advanced Directive.  A packet and workshop information was given on Advanced Directives.    ROS:    Gait: Uses a scooter   Ostomy: No   Other tubes: No   Amputations: No   Chronic oxygen use Yes   Last eye exam a 1 1/2 ago   Wears hearing aids: No   : Denies any urinary leakage during the last 6 months      Depression Screening    Little interest or pleasure in doing things?  0 - not at all  Feeling down, depressed, or  hopeless? 0 - not at all  Patient Health Questionnaire Score: 0    If depressive symptoms identified deferred to follow up visit unless specifically addressed in assessment and plan.    Interpretation of PHQ-9 Total Score   Score Severity   1-4 No Depression   5-9 Mild Depression   10-14 Moderate Depression   15-19 Moderately Severe Depression   20-27 Severe Depression    Screening for Cognitive Impairment    Three Minute Recall (leader, season, table)  2/3    Vick clock face with all 12 numbers and set the hands to show 10 past 11.  Yes 5/5  If cognitive concerns identified, deferred for follow up unless specifically addressed in assessment and plan.    Fall Risk Assessment    Has the patient had two or more falls in the last year or any fall with injury in the last year?  No  If fall risk identified, deferred for follow up unless specifically addressed in assessment and plan.    Safety Assessment    Throw rugs on floor.  Yes  Handrails on all stairs.  Yes  Good lighting in all hallways.  Yes  Difficulty hearing.  No  Patient counseled about all safety risks that were identified.    Functional Assessment ADLs    Are there any barriers preventing you from cooking for yourself or meeting nutritional needs?  No.    Are there any barriers preventing you from driving safely or obtaining transportation?  No.    Are there any barriers preventing you from using a telephone or calling for help?  No.    Are there any barriers preventing you from shopping?  No.    Are there any barriers preventing you from taking care of your own finances?  No.    Are there any barriers preventing you from managing your medications?  No.    Are there any barriers preventing you from showering, bathing or dressing yourself?  No.    Are you currently engaging in any exercise or physical activity?  Yes.  Pt walks when he can  What is your perception of your health?  Fair.    Health Maintenance Summary                IMM ZOSTER VACCINES Overdue  9/22/2016      Done 7/28/2016 Imm Admin: Zoster Vaccine Live (ZVL) (Zostavax)    Annual Wellness Visit Overdue 7/19/2018      Done 7/18/2017 Visit Dx: Medicare annual wellness visit, subsequent     Patient has more history with this topic...    IMM INFLUENZA Next Due 9/1/2018      Done 11/30/2017 Imm Admin: Influenza Vaccine-Flucelvax     Patient has more history with this topic...    COLONOSCOPY Next Due 8/6/2023      Done 8/6/2018 REFERRAL TO GI FOR COLONOSCOPY    IMM DTaP/Tdap/Td Vaccine Next Due 6/16/2025      Done 6/16/2015 Imm Admin: Tdap Vaccine          Patient Care Team:  Tim Serna M.D. as PCP - General (Family Medicine)  Julian Carmen M.D. as Consulting Physician (Surgery)  Oscar Avila M.D. as Consulting Physician (Orthopaedics)  [facility] Phys[facility]  Sulaiman Hook D.D.S. as Consulting Physician (Dentistry)  Jorge Luis Ventura M.D. as Consulting Physician (Ophthalmology)    Social History   Substance Use Topics   • Smoking status: Former Smoker     Packs/day: 1.00     Years: 25.00     Types: Cigarettes     Quit date: 1/1/1994   • Smokeless tobacco: Never Used   • Alcohol use 0.5 oz/week     1 Cans of beer per week      Comment: occational     Family History   Problem Relation Age of Onset   • Diabetes Mother    • Arthritis Mother    • Lung Disease Father    • Arthritis Father    • Heart Disease Father    • Alcohol/Drug Brother      He  has a past medical history of Arthritis; Chronic venous insufficiency (9/4/2014); Dental disorder (12/17/14); GERD (gastroesophageal reflux disease); Headache, classical migraine; Heart burn; Hypertension; Indigestion; MEDICAL HOME; Pain (12/17/14); Sleep apnea; and Snoring. He also has no past medical history of Anesthesia or Cold.   Past Surgical History:   Procedure Laterality Date   • GASTRIC SLEEVE LAPAROSCOPY  12/29/2014    Performed by Julian Carmen M.D. at SURGERY [facility] ORS   • KNEE REPLACEMENT, TOTAL  2012    left   • APPENDECTOMY     •  "CHOLECYSTECTOMY             Exam:     Blood pressure 136/84, pulse 67, temperature 36.7 °C (98 °F), height 1.803 m (5' 11\"), weight (!) 126.1 kg (278 lb), SpO2 97 %. Body mass index is 38.77 kg/m².    Constitutional: Alert, no distress.  Skin: Warm, dry, good turgor, no rashes in visible areas.  Eye: Equal, round and reactive, conjunctiva clear, lids normal.  ENMT: Lips without lesions, good dentition, oropharynx clear.  Psych: Alert and oriented x3, normal affect and mood.  MSK: Left thumb with full range of movement and 5/5 strength    Assessment and Plan. The following treatment and monitoring plan is recommended:    1. Medicare annual wellness visit, subsequent  Advised healthy lifestyle.  - Annual Wellness Visit - Includes PPPS Subsequent ()    2. Need for vaccination  Shingles prescription printed off to be done at the pharmacy.  - Zoster Vac Recomb Adjuvanted (SHINGRIX) 50 MCG Recon Susp; 0.5 mL by Intramuscular route Once for 1 dose.  Dispense: 0.5 mL; Refill: 0  - Annual Wellness Visit - Includes PPPS Subsequent ()    3. Chronic kidney disease (CKD), stage III (moderate)  Stable.  Follow-up with labs in 6 months.  - COMP METABOLIC PANEL; Future  - Annual Wellness Visit - Includes PPPS Subsequent ()    4. Idiopathic chronic gout of multiple sites with tophus  No recurrence.  Follow-up with uric acid in 6 months.  Continue allopurinol.  - URIC ACID; Future  - Annual Wellness Visit - Includes PPPS Subsequent ()    5. Prostate cancer screening  Follow-up with labs in 6 months.  - PROSTATE SPECIFIC AG SCREENING; Future  - Annual Wellness Visit - Includes PPPS Subsequent ()    6. Obesity (BMI 30-39.9)  Continue healthy lifestyle.  - Annual Wellness Visit - Includes PPPS Subsequent ()    7. Chronic pain of right knee  Severe degeneration, currently doing well.  Continue to monitor closely and follow-up with orthopedic as needed.  - Annual Wellness Visit - Includes PPPS Subsequent " ()    8. History of total left knee replacement  Patient has not had a desirable result.  He still has chronic pain in left knee after knee replacement.  - Annual Wellness Visit - Includes PPPS Subsequent ()    9. History of gastric surgery  Continue following with the bariatric clinic.  - Annual Wellness Visit - Includes PPPS Subsequent ()    10. Chronic midline low back pain without sciatica  Stable and needs very rare Percocet.  Has only used 30 tablets of Percocet in 1 year.  - Annual Wellness Visit - Includes PPPS Subsequent ()    11. Incisional hernia, without obstruction or gangrene  Patient is planning to have surgical correction next month.  - Annual Wellness Visit - Includes PPPS Subsequent ()    12. Tinnitus of both ears  Stable.  Continue to monitor.  - Annual Wellness Visit - Includes PPPS Subsequent ()    13. Dermatitis, seborrheic  Continue with shampoo and following up with dermatologist.  - Annual Wellness Visit - Includes PPPS Subsequent ()    14. Essential hypertension  Controlled.  Continue current medications.  - Annual Wellness Visit - Includes PPPS Subsequent ()    15. Chronic venous insufficiency  Controlled.  Continue current medications.  - Annual Wellness Visit - Includes PPPS Subsequent ()    16. Gastroesophageal reflux disease, esophagitis presence not specified  Controlled.  Continue omeprazole.  - Annual Wellness Visit - Includes PPPS Subsequent ()    17. Seasonal allergic rhinitis due to pollen  Stable.  Continue to monitor.  - Annual Wellness Visit - Includes PPPS Subsequent ()    18. Elevated fasting blood sugar  Follow-up with A1c.  Advised patient to reduce sugar intake.  - Annual Wellness Visit - Includes PPPS Subsequent ()  - HEMOGLOBIN A1C; Future    19. Chronic pain of left thumb  Most likely related to osteoarthritis.  Trial diclofenac gel.  If no improvement consider hand specialist referral for possible cortisone  injection.  - Annual Wellness Visit - Includes PPPS Subsequent ()  - Diclofenac Sodium 1 % Gel; Apply 1 g to skin as directed 2 Times a Day.  Dispense: 100 g; Refill: 2    20. Sleep apnea with use of continuous positive airway pressure (CPAP)  Continue with BiPAP.  - Annual Wellness Visit - Includes PPPS Subsequent ()    21. Migraine without status migrainosus, not intractable, unspecified migraine type  Rare occurrence.  Continue as needed Imitrex.  - Annual Wellness Visit - Includes PPPS Subsequent ()        Services suggested: No services needed at this time  Health Care Screening recommendations as per orders if indicated.  Referrals offered: PT/OT/Nutrition counseling/Behavioral Health/Smoking cessation as per orders if indicated.    Discussion today about general wellness and lifestyle habits:    · Prevent falls and reduce trip hazards; Cautioned about securing or removing rugs.  · Have a working fire alarm and carbon monoxide detector;   · Engage in regular physical activity and social activities       Follow-up: Return in about 6 months (around 2/22/2019) for Labs.

## 2018-09-27 DIAGNOSIS — Z01.810 PRE-OPERATIVE CARDIOVASCULAR EXAMINATION: ICD-10-CM

## 2018-09-27 DIAGNOSIS — Z01.812 PRE-OPERATIVE LABORATORY EXAMINATION: ICD-10-CM

## 2018-09-27 LAB
ANION GAP SERPL CALC-SCNC: 8 MMOL/L (ref 0–11.9)
BUN SERPL-MCNC: 25 MG/DL (ref 8–22)
CALCIUM SERPL-MCNC: 10.1 MG/DL (ref 8.5–10.5)
CHLORIDE SERPL-SCNC: 103 MMOL/L (ref 96–112)
CO2 SERPL-SCNC: 28 MMOL/L (ref 20–33)
CREAT SERPL-MCNC: 1.41 MG/DL (ref 0.5–1.4)
EKG IMPRESSION: NORMAL
GLUCOSE SERPL-MCNC: 104 MG/DL (ref 65–99)
POTASSIUM SERPL-SCNC: 4 MMOL/L (ref 3.6–5.5)
SODIUM SERPL-SCNC: 139 MMOL/L (ref 135–145)

## 2018-09-27 PROCEDURE — 93010 ELECTROCARDIOGRAM REPORT: CPT | Performed by: INTERNAL MEDICINE

## 2018-09-27 PROCEDURE — 93005 ELECTROCARDIOGRAM TRACING: CPT

## 2018-09-27 PROCEDURE — 80048 BASIC METABOLIC PNL TOTAL CA: CPT

## 2018-09-27 PROCEDURE — 36415 COLL VENOUS BLD VENIPUNCTURE: CPT

## 2018-10-01 ENCOUNTER — HOSPITAL ENCOUNTER (OUTPATIENT)
Facility: MEDICAL CENTER | Age: 63
End: 2018-10-02
Attending: COLON & RECTAL SURGERY | Admitting: COLON & RECTAL SURGERY
Payer: MEDICARE

## 2018-10-01 DIAGNOSIS — G89.29 CHRONIC MIDLINE LOW BACK PAIN WITHOUT SCIATICA: ICD-10-CM

## 2018-10-01 DIAGNOSIS — G89.18 POST-OP PAIN: ICD-10-CM

## 2018-10-01 DIAGNOSIS — M54.50 CHRONIC MIDLINE LOW BACK PAIN WITHOUT SCIATICA: ICD-10-CM

## 2018-10-01 DIAGNOSIS — M25.562 CHRONIC PAIN OF LEFT KNEE: ICD-10-CM

## 2018-10-01 DIAGNOSIS — G89.29 CHRONIC PAIN OF LEFT KNEE: ICD-10-CM

## 2018-10-01 LAB — PATHOLOGY CONSULT NOTE: NORMAL

## 2018-10-01 PROCEDURE — 160047 HCHG PACU  - EA ADDL 30 MINS PHASE II: Performed by: COLON & RECTAL SURGERY

## 2018-10-01 PROCEDURE — C1781 MESH (IMPLANTABLE): HCPCS | Performed by: COLON & RECTAL SURGERY

## 2018-10-01 PROCEDURE — 160028 HCHG SURGERY MINUTES - 1ST 30 MINS LEVEL 3: Performed by: COLON & RECTAL SURGERY

## 2018-10-01 PROCEDURE — 700101 HCHG RX REV CODE 250

## 2018-10-01 PROCEDURE — 160039 HCHG SURGERY MINUTES - EA ADDL 1 MIN LEVEL 3: Performed by: COLON & RECTAL SURGERY

## 2018-10-01 PROCEDURE — 160009 HCHG ANES TIME/MIN: Performed by: COLON & RECTAL SURGERY

## 2018-10-01 PROCEDURE — 501838 HCHG SUTURE GENERAL: Performed by: COLON & RECTAL SURGERY

## 2018-10-01 PROCEDURE — 160046 HCHG PACU - 1ST 60 MINS PHASE II: Performed by: COLON & RECTAL SURGERY

## 2018-10-01 PROCEDURE — 700102 HCHG RX REV CODE 250 W/ 637 OVERRIDE(OP): Performed by: ANESTHESIOLOGY

## 2018-10-01 PROCEDURE — 700111 HCHG RX REV CODE 636 W/ 250 OVERRIDE (IP)

## 2018-10-01 PROCEDURE — 501570 HCHG TROCAR, SEPARATOR: Performed by: COLON & RECTAL SURGERY

## 2018-10-01 PROCEDURE — 500064 HCHG BINDER, 4-PANEL MED/LG: Performed by: COLON & RECTAL SURGERY

## 2018-10-01 PROCEDURE — 160035 HCHG PACU - 1ST 60 MINS PHASE I: Performed by: COLON & RECTAL SURGERY

## 2018-10-01 PROCEDURE — 160036 HCHG PACU - EA ADDL 30 MINS PHASE I: Performed by: COLON & RECTAL SURGERY

## 2018-10-01 PROCEDURE — 501583 HCHG TROCAR, THRD CAN&SEAL 5X100: Performed by: COLON & RECTAL SURGERY

## 2018-10-01 PROCEDURE — G0378 HOSPITAL OBSERVATION PER HR: HCPCS

## 2018-10-01 PROCEDURE — A9270 NON-COVERED ITEM OR SERVICE: HCPCS | Performed by: ANESTHESIOLOGY

## 2018-10-01 PROCEDURE — 502571 HCHG PACK, LAP CHOLE: Performed by: COLON & RECTAL SURGERY

## 2018-10-01 PROCEDURE — A9270 NON-COVERED ITEM OR SERVICE: HCPCS | Performed by: PHYSICIAN ASSISTANT

## 2018-10-01 PROCEDURE — 501571 HCHG TROCAR, SEPARATOR 12X100: Performed by: COLON & RECTAL SURGERY

## 2018-10-01 PROCEDURE — 700102 HCHG RX REV CODE 250 W/ 637 OVERRIDE(OP): Performed by: PHYSICIAN ASSISTANT

## 2018-10-01 PROCEDURE — 700111 HCHG RX REV CODE 636 W/ 250 OVERRIDE (IP): Performed by: ANESTHESIOLOGY

## 2018-10-01 PROCEDURE — 160048 HCHG OR STATISTICAL LEVEL 1-5: Performed by: COLON & RECTAL SURGERY

## 2018-10-01 PROCEDURE — 160002 HCHG RECOVERY MINUTES (STAT): Performed by: COLON & RECTAL SURGERY

## 2018-10-01 PROCEDURE — 88304 TISSUE EXAM BY PATHOLOGIST: CPT

## 2018-10-01 PROCEDURE — 160025 RECOVERY II MINUTES (STATS): Performed by: COLON & RECTAL SURGERY

## 2018-10-01 DEVICE — MESH VENTRALIGHT 4 X 6 INCH - (1EA/CA): Type: IMPLANTABLE DEVICE | Status: FUNCTIONAL

## 2018-10-01 RX ORDER — HYDROMORPHONE HYDROCHLORIDE 2 MG/ML
0.5 INJECTION, SOLUTION INTRAMUSCULAR; INTRAVENOUS; SUBCUTANEOUS
Status: DISCONTINUED | OUTPATIENT
Start: 2018-10-01 | End: 2018-10-02 | Stop reason: HOSPADM

## 2018-10-01 RX ORDER — BACITRACIN 50000 [IU]/1
INJECTION, POWDER, FOR SOLUTION INTRAMUSCULAR
Status: DISCONTINUED | OUTPATIENT
Start: 2018-10-01 | End: 2018-10-01 | Stop reason: HOSPADM

## 2018-10-01 RX ORDER — OXYCODONE HYDROCHLORIDE AND ACETAMINOPHEN 5; 325 MG/1; MG/1
2 TABLET ORAL
Status: COMPLETED | OUTPATIENT
Start: 2018-10-01 | End: 2018-10-01

## 2018-10-01 RX ORDER — OXYCODONE HYDROCHLORIDE AND ACETAMINOPHEN 5; 325 MG/1; MG/1
1 TABLET ORAL
Status: COMPLETED | OUTPATIENT
Start: 2018-10-01 | End: 2018-10-01

## 2018-10-01 RX ORDER — HALOPERIDOL 5 MG/ML
1 INJECTION INTRAMUSCULAR
Status: DISCONTINUED | OUTPATIENT
Start: 2018-10-01 | End: 2018-10-01 | Stop reason: HOSPADM

## 2018-10-01 RX ORDER — LIDOCAINE HYDROCHLORIDE 10 MG/ML
INJECTION, SOLUTION INFILTRATION; PERINEURAL
Status: COMPLETED
Start: 2018-10-01 | End: 2018-10-01

## 2018-10-01 RX ORDER — HYDROMORPHONE HYDROCHLORIDE 2 MG/ML
0.1 INJECTION, SOLUTION INTRAMUSCULAR; INTRAVENOUS; SUBCUTANEOUS
Status: DISCONTINUED | OUTPATIENT
Start: 2018-10-01 | End: 2018-10-01 | Stop reason: HOSPADM

## 2018-10-01 RX ORDER — LORAZEPAM 2 MG/ML
0.5 INJECTION INTRAMUSCULAR EVERY 6 HOURS PRN
Status: DISCONTINUED | OUTPATIENT
Start: 2018-10-01 | End: 2018-10-02 | Stop reason: HOSPADM

## 2018-10-01 RX ORDER — MEPERIDINE HYDROCHLORIDE 25 MG/ML
6.25 INJECTION INTRAMUSCULAR; INTRAVENOUS; SUBCUTANEOUS
Status: DISCONTINUED | OUTPATIENT
Start: 2018-10-01 | End: 2018-10-01 | Stop reason: HOSPADM

## 2018-10-01 RX ORDER — HYDROMORPHONE HYDROCHLORIDE 2 MG/ML
0.2 INJECTION, SOLUTION INTRAMUSCULAR; INTRAVENOUS; SUBCUTANEOUS
Status: DISCONTINUED | OUTPATIENT
Start: 2018-10-01 | End: 2018-10-01 | Stop reason: HOSPADM

## 2018-10-01 RX ORDER — BUPIVACAINE HYDROCHLORIDE AND EPINEPHRINE 5; 5 MG/ML; UG/ML
INJECTION, SOLUTION EPIDURAL; INTRACAUDAL; PERINEURAL
Status: DISCONTINUED | OUTPATIENT
Start: 2018-10-01 | End: 2018-10-01 | Stop reason: HOSPADM

## 2018-10-01 RX ORDER — OXYCODONE AND ACETAMINOPHEN 10; 325 MG/1; MG/1
1-2 TABLET ORAL EVERY 6 HOURS PRN
Qty: 28 TAB | Refills: 0 | Status: SHIPPED | OUTPATIENT
Start: 2018-10-01 | End: 2018-10-06

## 2018-10-01 RX ORDER — ACETAMINOPHEN 325 MG/1
650 TABLET ORAL
Status: ON HOLD | COMMUNITY
End: 2018-10-01

## 2018-10-01 RX ORDER — ONDANSETRON 2 MG/ML
4 INJECTION INTRAMUSCULAR; INTRAVENOUS
Status: DISCONTINUED | OUTPATIENT
Start: 2018-10-01 | End: 2018-10-01 | Stop reason: HOSPADM

## 2018-10-01 RX ORDER — LABETALOL HYDROCHLORIDE 5 MG/ML
5 INJECTION, SOLUTION INTRAVENOUS
Status: DISCONTINUED | OUTPATIENT
Start: 2018-10-01 | End: 2018-10-01 | Stop reason: HOSPADM

## 2018-10-01 RX ORDER — SODIUM CHLORIDE AND POTASSIUM CHLORIDE 150; 900 MG/100ML; MG/100ML
INJECTION, SOLUTION INTRAVENOUS CONTINUOUS
Status: DISCONTINUED | OUTPATIENT
Start: 2018-10-01 | End: 2018-10-02 | Stop reason: HOSPADM

## 2018-10-01 RX ORDER — HYDROMORPHONE HYDROCHLORIDE 2 MG/ML
1 INJECTION, SOLUTION INTRAMUSCULAR; INTRAVENOUS; SUBCUTANEOUS
Status: COMPLETED | OUTPATIENT
Start: 2018-10-01 | End: 2018-10-01

## 2018-10-01 RX ORDER — OXYCODONE HYDROCHLORIDE 10 MG/1
10 TABLET ORAL EVERY 4 HOURS PRN
Status: DISCONTINUED | OUTPATIENT
Start: 2018-10-01 | End: 2018-10-02 | Stop reason: HOSPADM

## 2018-10-01 RX ORDER — TRIAMTERENE AND HYDROCHLOROTHIAZIDE 37.5; 25 MG/1; MG/1
1 TABLET ORAL DAILY
Status: DISCONTINUED | OUTPATIENT
Start: 2018-10-02 | End: 2018-10-02 | Stop reason: HOSPADM

## 2018-10-01 RX ORDER — SODIUM CHLORIDE, SODIUM LACTATE, POTASSIUM CHLORIDE, CALCIUM CHLORIDE 600; 310; 30; 20 MG/100ML; MG/100ML; MG/100ML; MG/100ML
INJECTION, SOLUTION INTRAVENOUS CONTINUOUS
Status: DISCONTINUED | OUTPATIENT
Start: 2018-10-01 | End: 2018-10-01 | Stop reason: HOSPADM

## 2018-10-01 RX ORDER — ENALAPRILAT 1.25 MG/ML
2.5 INJECTION INTRAVENOUS EVERY 6 HOURS PRN
Status: DISCONTINUED | OUTPATIENT
Start: 2018-10-01 | End: 2018-10-02 | Stop reason: HOSPADM

## 2018-10-01 RX ORDER — ONDANSETRON 2 MG/ML
4 INJECTION INTRAMUSCULAR; INTRAVENOUS EVERY 4 HOURS PRN
Status: DISCONTINUED | OUTPATIENT
Start: 2018-10-01 | End: 2018-10-02 | Stop reason: HOSPADM

## 2018-10-01 RX ORDER — SODIUM CHLORIDE, SODIUM LACTATE, POTASSIUM CHLORIDE, CALCIUM CHLORIDE 600; 310; 30; 20 MG/100ML; MG/100ML; MG/100ML; MG/100ML
INJECTION, SOLUTION INTRAVENOUS CONTINUOUS
Status: ACTIVE | OUTPATIENT
Start: 2018-10-01 | End: 2018-10-01

## 2018-10-01 RX ORDER — HYDROMORPHONE HYDROCHLORIDE 2 MG/ML
INJECTION, SOLUTION INTRAMUSCULAR; INTRAVENOUS; SUBCUTANEOUS
Status: COMPLETED
Start: 2018-10-01 | End: 2018-10-01

## 2018-10-01 RX ORDER — HYDROMORPHONE HYDROCHLORIDE 2 MG/ML
0.4 INJECTION, SOLUTION INTRAMUSCULAR; INTRAVENOUS; SUBCUTANEOUS
Status: DISCONTINUED | OUTPATIENT
Start: 2018-10-01 | End: 2018-10-01 | Stop reason: HOSPADM

## 2018-10-01 RX ADMIN — HYDROMORPHONE HYDROCHLORIDE 1 MG: 2 INJECTION INTRAMUSCULAR; INTRAVENOUS; SUBCUTANEOUS at 12:06

## 2018-10-01 RX ADMIN — HYDROMORPHONE HYDROCHLORIDE 0.4 MG: 2 INJECTION, SOLUTION INTRAMUSCULAR; INTRAVENOUS; SUBCUTANEOUS at 09:14

## 2018-10-01 RX ADMIN — Medication 0.5 ML: at 07:01

## 2018-10-01 RX ADMIN — SODIUM CHLORIDE, SODIUM LACTATE, POTASSIUM CHLORIDE, CALCIUM CHLORIDE: 600; 310; 30; 20 INJECTION, SOLUTION INTRAVENOUS at 09:20

## 2018-10-01 RX ADMIN — OXYCODONE HYDROCHLORIDE 10 MG: 10 TABLET ORAL at 18:22

## 2018-10-01 RX ADMIN — SODIUM CHLORIDE, SODIUM LACTATE, POTASSIUM CHLORIDE, CALCIUM CHLORIDE: 600; 310; 30; 20 INJECTION, SOLUTION INTRAVENOUS at 07:01

## 2018-10-01 RX ADMIN — HYDROMORPHONE HYDROCHLORIDE 0.4 MG: 2 INJECTION INTRAMUSCULAR; INTRAVENOUS; SUBCUTANEOUS at 09:14

## 2018-10-01 RX ADMIN — HYDROMORPHONE HYDROCHLORIDE 0.4 MG: 2 INJECTION, SOLUTION INTRAMUSCULAR; INTRAVENOUS; SUBCUTANEOUS at 09:37

## 2018-10-01 RX ADMIN — OXYCODONE HYDROCHLORIDE 10 MG: 10 TABLET ORAL at 22:32

## 2018-10-01 RX ADMIN — LIDOCAINE HYDROCHLORIDE 0.5 ML: 10 INJECTION, SOLUTION INFILTRATION; PERINEURAL at 07:01

## 2018-10-01 RX ADMIN — HYDROMORPHONE HYDROCHLORIDE 0.4 MG: 2 INJECTION, SOLUTION INTRAMUSCULAR; INTRAVENOUS; SUBCUTANEOUS at 09:46

## 2018-10-01 RX ADMIN — OXYCODONE AND ACETAMINOPHEN 2 TABLET: 5; 325 TABLET ORAL at 09:18

## 2018-10-01 RX ADMIN — HYDROMORPHONE HYDROCHLORIDE 0.4 MG: 2 INJECTION, SOLUTION INTRAMUSCULAR; INTRAVENOUS; SUBCUTANEOUS at 09:23

## 2018-10-01 ASSESSMENT — COGNITIVE AND FUNCTIONAL STATUS - GENERAL
SUGGESTED CMS G CODE MODIFIER MOBILITY: CK
TURNING FROM BACK TO SIDE WHILE IN FLAT BAD: A LITTLE
DRESSING REGULAR LOWER BODY CLOTHING: A LITTLE
HELP NEEDED FOR BATHING: A LITTLE
DAILY ACTIVITIY SCORE: 19
MOBILITY SCORE: 18
SUGGESTED CMS G CODE MODIFIER DAILY ACTIVITY: CK
MOVING FROM LYING ON BACK TO SITTING ON SIDE OF FLAT BED: A LITTLE
CLIMB 3 TO 5 STEPS WITH RAILING: A LITTLE
WALKING IN HOSPITAL ROOM: A LITTLE
TOILETING: A LITTLE
DRESSING REGULAR UPPER BODY CLOTHING: A LITTLE
PERSONAL GROOMING: A LITTLE
MOVING TO AND FROM BED TO CHAIR: A LITTLE
STANDING UP FROM CHAIR USING ARMS: A LITTLE

## 2018-10-01 ASSESSMENT — PAIN SCALES - GENERAL
PAINLEVEL_OUTOF10: 10
PAINLEVEL_OUTOF10: 5
PAINLEVEL_OUTOF10: 6
PAINLEVEL_OUTOF10: 3
PAINLEVEL_OUTOF10: 6
PAINLEVEL_OUTOF10: 3
PAINLEVEL_OUTOF10: 9
PAINLEVEL_OUTOF10: 7
PAINLEVEL_OUTOF10: 7
PAINLEVEL_OUTOF10: 8
PAINLEVEL_OUTOF10: 3
PAINLEVEL_OUTOF10: 6
PAINLEVEL_OUTOF10: 5
PAINLEVEL_OUTOF10: 10
PAINLEVEL_OUTOF10: 9
PAINLEVEL_OUTOF10: 7
PAINLEVEL_OUTOF10: 5
PAINLEVEL_OUTOF10: 8
PAINLEVEL_OUTOF10: 6
PAINLEVEL_OUTOF10: 7
PAINLEVEL_OUTOF10: 6
PAINLEVEL_OUTOF10: 6
PAINLEVEL_OUTOF10: 3

## 2018-10-01 ASSESSMENT — COPD QUESTIONNAIRES
IN THE PAST 12 MONTHS DO YOU DO LESS THAN YOU USED TO BECAUSE OF YOUR BREATHING PROBLEMS: DISAGREE/UNSURE
DO YOU EVER COUGH UP ANY MUCUS OR PHLEGM?: NO/ONLY WITH OCCASIONAL COLDS OR INFECTIONS
DURING THE PAST 4 WEEKS HOW MUCH DID YOU FEEL SHORT OF BREATH: NONE/LITTLE OF THE TIME
COPD SCREENING SCORE: 2
HAVE YOU SMOKED AT LEAST 100 CIGARETTES IN YOUR ENTIRE LIFE: NO/DON'T KNOW

## 2018-10-01 ASSESSMENT — LIFESTYLE VARIABLES
ON A TYPICAL DAY WHEN YOU DRINK ALCOHOL HOW MANY DRINKS DO YOU HAVE: 1
TOTAL SCORE: 0
TOTAL SCORE: 0
AVERAGE NUMBER OF DAYS PER WEEK YOU HAVE A DRINK CONTAINING ALCOHOL: .25
EVER FELT BAD OR GUILTY ABOUT YOUR DRINKING: NO
HOW MANY TIMES IN THE PAST YEAR HAVE YOU HAD 5 OR MORE DRINKS IN A DAY: 0
CONSUMPTION TOTAL: NEGATIVE
EVER_SMOKED: YES
EVER HAD A DRINK FIRST THING IN THE MORNING TO STEADY YOUR NERVES TO GET RID OF A HANGOVER: NO
HAVE PEOPLE ANNOYED YOU BY CRITICIZING YOUR DRINKING: NO
TOTAL SCORE: 0
ALCOHOL_USE: YES
HAVE YOU EVER FELT YOU SHOULD CUT DOWN ON YOUR DRINKING: NO

## 2018-10-01 ASSESSMENT — PATIENT HEALTH QUESTIONNAIRE - PHQ9
SUM OF ALL RESPONSES TO PHQ9 QUESTIONS 1 AND 2: 0
1. LITTLE INTEREST OR PLEASURE IN DOING THINGS: NOT AT ALL
2. FEELING DOWN, DEPRESSED, IRRITABLE, OR HOPELESS: NOT AT ALL

## 2018-10-01 NOTE — PROGRESS NOTES
Pt Max admitted to room T425 via transport in [facility] from PACU.  Pt aao x 4,  pain reported at 6 on a scale of 0-10. Oriented to room call light and smoking policy.  Reviewed plan of care (equipment, incentive spirometer, sequential compression devices, medications, activity, diet, fall precautions, skin care, and pain) with patient and family. Welcome packet given and reviewed with patient , all questions answered. Education provided on oral hygiene program.   Call placed to THEE Ashraf for pain medication orders

## 2018-10-01 NOTE — OR NURSING
Patient A+Ox4. Still c/o 8/10 pain. Given meds as ordered. States he does take pain meds occ at home for chronic back and knee pain and has a tolerance to meds.  Cont to give meds as ordered. Dr Humphries updated

## 2018-10-01 NOTE — OP REPORT
NAME:  Max Alberto  MRN:  [id number]  :  1955      DATE OF OPERATION: 10/1/2018    PREOPERATIVE DIAGNOSIS: Incisional Hernia    POSTOPERATIVE DIAGNOSIS: Incisional Hernia with Adhesions with incarceration of omentum; Subcutaneous 2 cm mass    OPERATION PERFORMED:  Laparoscopic Incisional Hernia Repair with Mesh with Adhesiolysis; Excision of 2cm subcutaenous mass    SURGEON: Julian Carmen MD    ASSISTANT: Maru Lutz PA-C    ANESTHESIOLOGIST:  Oscar Humphries MD.  , MD    ANESTHESIA: General endotracheal anesthesia.      SPECIMEN: None    ESTIMATED BLOOD LOSS: <5 cc.     INDICATIONS: The patient is a 63 y.o. male with a history of symptomatic bulging mass. He is taken to the operating room today for laparoscopic hernia repair with mesh.     PROCEDURE: Following informed consent, the patient was properly identified, taken to the operating room, and placed in the supine position where general endotracheal anesthesia was administered. Intravenous antibiotics were administered by the anesthesiologist in the correct time interval. Sequential compression devices were employed. The abdomen was prepped and draped into a sterile field.     A bladeless optical entry trocar was carefully inserted into the abdomen and pneumoperitoneum was established in the usual fashion. A 5 mm lens/camera was passed into the peritoneal cavity.  Two additional separator trocars were placed under direct vision.     The abdominal wall was examined and demonstrated a incisional hernia with a 2.5 cm defect with adhesions, and with incarcerated omentum. Adhesiolysis was performed.  The omentum around the defect was reduced and cautery used to separate the omentum from the hernia sac and fascial edges.  Once the abdominal wall was completely exposed, a  piece of 4 x 6 in mesh was chosen and soaked in antibiotic solution.  Four O-Vicryl sutures were placed at the north, south, east and west margins of the mesh, and  corresponding incision made in the abdominal wall at the superior margin of the intact fascia, for transfascial anchoring sutures.  The mesh was then rolled and introduced into the abdomen, then unfurled and oriented so as to place the woven mesh toward the hernia defect and the ends of the Vicryl were retrieved with the Endoclose device, and the mesh then further secured to the abdominal wall with the absorbable tacker device.  This resulted in a nice obliteration of the hernia defect with good orientation and placement.      Final inspections revealed a wide overlap and coverage of the fascial defect and a sturdy, effective repair.  Palpation over the repair demonstrated a 2cm subcutaenous firm mass. This was excised and appeared to be a primarily fatty mass with perhaps some reactive scar.    The ports were removed and the abdomen desufflated. The trocars were removed under direct vision.  The port site skin incisions were closed with interrupted 4-0 Vicryl subcuticular sutures.  Steri-Strips and Benzoin were applied beneath sterile Band-Aids.     The patient tolerated the procedure well and there were no apparent complications. All sponge, needle, and instrument counts were correct on 2 separate occasions.  He was awakened, extubated, and transferred to the recovery room in satisfactory condition.       ____________________________________   Julian Carmen MD  DD: 10/1/2018  9:11 AM    CC:  [facility];

## 2018-10-01 NOTE — OR NURSING
VSS. Patient states pain 6/10 but tolerable and declines further meds. abd stab sites oozing sm amount of serosangious fluid.  Dressing x3 reenforced.  Plan to discharge patient home. Wife updated with plan

## 2018-10-01 NOTE — OR NURSING
"1050- Patient arrived in PACU II alert and oriented and patient states he is having 6-7/10 pain but it is tolerable at this time. Vital signs are within normal limits for the patient. Patient's wife, Komal is at bedside. Patient states he does not feel ready to go home at this time and would like to relax a little while longer.    1115- Discussed discharge education with the patient with wife at bedside. All questions answered. Patient states he wants to try to void. Assisted patient to side of bed and standing position and he reports \"terrible pain\" in his abdomen and he doesn't think he can walk to the restroom. Provided patient with urinal and assisted him back into bed. Patient states pain is 10/10 and he states he is afraid to go home with this much pain.     1120- Spoke with Dr. Carmen regarding patient's pain scale. Dr. Carmen stated he can be admitted overnight for observation.     1130- Spoke with Dr. Humphries on the phone regarding patient's pain, orders received for pain medication. See MAR for details. Patient has been updated on plan of care, all questions answered.     1200- Pain medication given to patient. He still reports 10/10 pain lying in bed. Patient's wife left for a little while. Call light in within reach. All needs met at this time.    1230- Patient reports pain is getting better after relaxing in bed and pain medication. Resting pain is 7/10 but increases with movement.    1300- Pain is down to 6/10, patient denies pain medication at this time but still has not been able to get out of bed. Encouraged patient to practice deep breathing. All needs met at this time while he waits for inpatient room assignement.     1400- Patient reports 6/10 and feels like he needs a dose of pain medication. See MAR.    1430- Still pending inpatient room assignment. Patient is more comfortable at this time. Call light is within reach. All needs met at this time.    1500- Handout report given to KAYLEIGH Moran.  "

## 2018-10-01 NOTE — OR NURSING
Patient A+OX4. VSS C/o 9/10 pain to abd incision sites.  Ice pack applied and meds given as ordered.  Small amount of bloody drainage from left lateral stab sites.  Cont to monitor

## 2018-10-02 VITALS
OXYGEN SATURATION: 95 % | HEIGHT: 73 IN | RESPIRATION RATE: 18 BRPM | WEIGHT: 279.1 LBS | HEART RATE: 87 BPM | DIASTOLIC BLOOD PRESSURE: 82 MMHG | SYSTOLIC BLOOD PRESSURE: 128 MMHG | BODY MASS INDEX: 36.99 KG/M2 | TEMPERATURE: 97.7 F

## 2018-10-02 LAB
ANION GAP SERPL CALC-SCNC: 6 MMOL/L (ref 0–11.9)
BUN SERPL-MCNC: 17 MG/DL (ref 8–22)
CALCIUM SERPL-MCNC: 8.9 MG/DL (ref 8.5–10.5)
CHLORIDE SERPL-SCNC: 104 MMOL/L (ref 96–112)
CO2 SERPL-SCNC: 28 MMOL/L (ref 20–33)
CREAT SERPL-MCNC: 1.25 MG/DL (ref 0.5–1.4)
ERYTHROCYTE [DISTWIDTH] IN BLOOD BY AUTOMATED COUNT: 45.7 FL (ref 35.9–50)
GLUCOSE SERPL-MCNC: 128 MG/DL (ref 65–99)
HCT VFR BLD AUTO: 42.2 % (ref 42–52)
HGB BLD-MCNC: 14.6 G/DL (ref 14–18)
MCH RBC QN AUTO: 32.4 PG (ref 27–33)
MCHC RBC AUTO-ENTMCNC: 34.6 G/DL (ref 33.7–35.3)
MCV RBC AUTO: 93.6 FL (ref 81.4–97.8)
PLATELET # BLD AUTO: 176 K/UL (ref 164–446)
PMV BLD AUTO: 9.9 FL (ref 9–12.9)
POTASSIUM SERPL-SCNC: 4.3 MMOL/L (ref 3.6–5.5)
RBC # BLD AUTO: 4.51 M/UL (ref 4.7–6.1)
SODIUM SERPL-SCNC: 138 MMOL/L (ref 135–145)
WBC # BLD AUTO: 9.5 K/UL (ref 4.8–10.8)

## 2018-10-02 PROCEDURE — 700111 HCHG RX REV CODE 636 W/ 250 OVERRIDE (IP): Performed by: PHYSICIAN ASSISTANT

## 2018-10-02 PROCEDURE — 36415 COLL VENOUS BLD VENIPUNCTURE: CPT

## 2018-10-02 PROCEDURE — 80048 BASIC METABOLIC PNL TOTAL CA: CPT

## 2018-10-02 PROCEDURE — 700102 HCHG RX REV CODE 250 W/ 637 OVERRIDE(OP): Performed by: PHYSICIAN ASSISTANT

## 2018-10-02 PROCEDURE — A9270 NON-COVERED ITEM OR SERVICE: HCPCS | Performed by: PHYSICIAN ASSISTANT

## 2018-10-02 PROCEDURE — 85027 COMPLETE CBC AUTOMATED: CPT

## 2018-10-02 PROCEDURE — G0378 HOSPITAL OBSERVATION PER HR: HCPCS

## 2018-10-02 RX ORDER — FAMOTIDINE 20 MG/1
20 TABLET, FILM COATED ORAL 2 TIMES DAILY
Status: DISCONTINUED | OUTPATIENT
Start: 2018-10-02 | End: 2018-10-02 | Stop reason: HOSPADM

## 2018-10-02 RX ADMIN — FAMOTIDINE 20 MG: 10 INJECTION, SOLUTION INTRAVENOUS at 05:20

## 2018-10-02 RX ADMIN — TRIAMTERENE AND HYDROCHLOROTHIAZIDE 1 TABLET: 37.5; 25 TABLET ORAL at 05:20

## 2018-10-02 RX ADMIN — OXYCODONE HYDROCHLORIDE 10 MG: 10 TABLET ORAL at 14:00

## 2018-10-02 RX ADMIN — OXYCODONE HYDROCHLORIDE 10 MG: 10 TABLET ORAL at 09:49

## 2018-10-02 RX ADMIN — OXYCODONE HYDROCHLORIDE 10 MG: 10 TABLET ORAL at 04:34

## 2018-10-02 ASSESSMENT — ENCOUNTER SYMPTOMS
CONSTIPATION: 0
CHILLS: 0
HEARTBURN: 0
SHORTNESS OF BREATH: 0
NAUSEA: 0
COUGH: 0
DIARRHEA: 0
ABDOMINAL PAIN: 1
FEVER: 0
VOMITING: 0

## 2018-10-02 ASSESSMENT — PAIN SCALES - GENERAL
PAINLEVEL_OUTOF10: 5
PAINLEVEL_OUTOF10: 6
PAINLEVEL_OUTOF10: 6
PAINLEVEL_OUTOF10: 8
PAINLEVEL_OUTOF10: 5
PAINLEVEL_OUTOF10: 4

## 2018-10-02 NOTE — DISCHARGE INSTRUCTIONS
1. DIET: Upon discharge from the hospital you may resume your normal preoperative diet. Depending on how you are feeling and whether you have nausea or not, you may wish to stay with a bland diet for the first few days. However, you can advance this as quickly as you feel ready.     2. ACTIVITIES: After discharge from the hospital, you may resume full routine activities. However, there should be no heavy lifting (greater than 15 pounds) and no strenuous activities until after your follow-up visit. Otherwise, routine activities of daily living are acceptable.     3. DRIVING: You may drive whenever you are off pain medications and are able to perform the activities needed to drive, i.e. turning, bending, twisting, etc.     4. BATHING: You may get the wound wet at any time after leaving the hospital. You may shower, but do not submerge in a bath for at least a week. Dressings may come off after 48 hours.     5. BOWEL FUNCTION: Constipation is common after an operation, especially with pain medications. The combination of pain medication and decreased activity level can cause constipation in otherwise normal patients. If you feel this is occurring, take a laxative (Miralax, Milk of Magnesia, Ex-Lax, Senokot, etc.) until the problem has resolved.     6. PAIN MEDICATION: You will be given a prescription for pain medication at discharge. Please take these as directed. It is important to remember not to take medications on an empty stomach as this may cause nausea.     7.CALL IF YOU HAVE: (1) Fevers to more than 101.o0 F, (2) Unusual chest or leg pain, (3) Drainage or fluid from incision that may be foul smelling, increased tenderness or soreness at the wound or the wound edges are no longer together, redness or swelling at the incision site. Please do not hesitate to call with any other questions.     8. APPOINTMENT: Contact our office at [phone] for a follow-up appointment in 2 weeks following your procedure.     If  you have any additional questions, please do not hesitate to call the office and speak to either myself or the physician on call.     Office address:   [facility].   [address] [place] [address]   [address]   [place] [address]    Discharge Instructions    Discharged to home by car with relative. Discharged via wheelchair, hospital escort: Yes.  Special equipment needed: Not Applicable    Be sure to schedule a follow-up appointment with your primary care doctor or any specialists as instructed.     Discharge Plan:   Influenza Vaccine Indication: Patient Refuses    I understand that a diet low in cholesterol, fat, and sodium is recommended for good health. Unless I have been given specific instructions below for another diet, I accept this instruction as my diet prescription.   Other diet: diet as tolerated    Special Instructions: None    · Is patient discharged on Warfarin / Coumadin?   No     Depression / Suicide Risk    As you are discharged from this [facility] facility, it is important to learn how to keep safe from harming yourself.    Recognize the warning signs:  · Abrupt changes in personality, positive or negative- including increase in energy   · Giving away possessions  · Change in eating patterns- significant weight changes-  positive or negative  · Change in sleeping patterns- unable to sleep or sleeping all the time   · Unwillingness or inability to communicate  · Depression  · Unusual sadness, discouragement and loneliness  · Talk of wanting to die  · Neglect of personal appearance   · Rebelliousness- reckless behavior  · Withdrawal from people/activities they love  · Confusion- inability to concentrate     If you or a loved one observes any of these behaviors or has concerns about self-harm, here's what you can do:  · Talk about it- your feelings and reasons for harming yourself  · Remove any means that you might use to hurt yourself (examples: pills, rope, extension cords, firearm)  · Get  professional help from the community (Mental Health, Substance Abuse, psychological counseling)  · Do not be alone:Call your Safe Contact- someone whom you trust who will be there for you.  · Call your local CRISIS HOTLINE 254-9484 or 845-449-3059  · Call your local Children's Mobile Crisis Response Team [place] (996) 171-6120 or www.Clipmarks  · Call the toll free National Suicide Prevention Hotlines   · National Suicide Prevention Lifeline 583-138-OWOE (8225)  · National Hope Line Network 800-SUICIDE (504-8081)

## 2018-10-02 NOTE — PROGRESS NOTES
Surgical Progress Note    Author: Vale Mcdonough Date & Time created: 10/2/2018   8:11 AM     Interval Events:  Pt doing well POD #1 s/p hernia repair. Admitted and stayed overnight due to pain control. He is ambulating, voiding, tolerating PO. Alert and oriented. NAD. Breathing unlabored. Abdomen tender, soft, mild distention. VS reviewed. Labs reviewed. Plan for home later today. Discussed with Dr. Carmen. Pt give prescriptions for pain yesterday.   Review of Systems   Constitutional: Negative for chills and fever.   Respiratory: Negative for cough and shortness of breath.    Gastrointestinal: Positive for abdominal pain. Negative for constipation, diarrhea, heartburn, nausea and vomiting.   Skin: Negative for itching and rash.     Hemodynamics:  Temp (24hrs), Av.4 °C (97.6 °F), Min:36.2 °C (97.1 °F), Max:36.7 °C (98 °F)  Temperature: 36.2 °C (97.1 °F)  Pulse  Av.4  Min: 66  Max: 94Heart Rate (Monitored): 79  Blood Pressure: 108/72, NIBP: 117/84     Respiratory:    Respiration: 18, Pulse Oximetry: 95 %           Neuro:  GCS       Fluids:    Intake/Output Summary (Last 24 hours) at 10/02/18 0811  Last data filed at 10/01/18 1300   Gross per 24 hour   Intake             1330 ml   Output                0 ml   Net             1330 ml       Current Diet Order   Procedures   • Diet Order Full Liquid     Physical Exam   Constitutional: He is oriented to person, place, and time. He appears well-developed and well-nourished. No distress.   Cardiovascular: Normal rate.    Pulmonary/Chest: Effort normal. No respiratory distress.   Abdominal: Soft. He exhibits distension. There is tenderness. There is no rebound and no guarding.   Neurological: He is alert and oriented to person, place, and time.   Skin: Skin is warm and dry. No rash noted. He is not diaphoretic. No erythema.   Psychiatric: He has a normal mood and affect. His behavior is normal.   Vitals reviewed.    Labs:  Recent Results (from the past 24 hour(s))    HISTOLOGY REQUEST    Collection Time: 10/01/18 10:22 AM   Result Value Ref Range    Pathology Request Sent to [facility]    BASIC METABOLIC PANEL    Collection Time: 10/02/18  3:50 AM   Result Value Ref Range    Sodium 138 135 - 145 mmol/L    Potassium 4.3 3.6 - 5.5 mmol/L    Chloride 104 96 - 112 mmol/L    Co2 28 20 - 33 mmol/L    Glucose 128 (H) 65 - 99 mg/dL    Bun 17 8 - 22 mg/dL    Creatinine 1.25 0.50 - 1.40 mg/dL    Calcium 8.9 8.5 - 10.5 mg/dL    Anion Gap 6.0 0.0 - 11.9   CBC WITHOUT DIFFERENTIAL    Collection Time: 10/02/18  3:50 AM   Result Value Ref Range    WBC 9.5 4.8 - 10.8 K/uL    RBC 4.51 (L) 4.70 - 6.10 M/uL    Hemoglobin 14.6 14.0 - 18.0 g/dL    Hematocrit 42.2 42.0 - 52.0 %    MCV 93.6 81.4 - 97.8 fL    MCH 32.4 27.0 - 33.0 pg    MCHC 34.6 33.7 - 35.3 g/dL    RDW 45.7 35.9 - 50.0 fL    Platelet Count 176 164 - 446 K/uL    MPV 9.9 9.0 - 12.9 fL   ESTIMATED GFR    Collection Time: 10/02/18  3:50 AM   Result Value Ref Range    GFR If African American >60 >60 mL/min/1.73 m 2    GFR If Non African American 58 (A) >60 mL/min/1.73 m 2     Medical Decision Making, by Problem:  There are no active hospital problems to display for this patient.    Plan:  Pt doing well POD #1 s/p hernia repair. Admitted and stayed overnight due to pain control. He is ambulating, voiding, tolerating PO. Alert and oriented. NAD. Breathing unlabored. Abdomen tender, soft, mild distention. VS reviewed. Labs reviewed. Plan for home later today. Discussed with Dr. Carmen. Pt give prescriptions for pain yesterday.     Quality Measures:  Quality-Core Measures   Reviewed items::  Medications reviewed and Labs reviewed  Ward catheter::  No Ward  DVT prophylaxis - mechanical:  SCDs  Ulcer Prophylaxis::  Yes      Discussed patient condition with Patient and Dr. Carmen

## 2018-10-02 NOTE — PROGRESS NOTES
Received on his room awake, watching TV. 8 lap stab on his abdomen, approximated with old drainage. Pain well controlled with Oxy IR 10 mg tab. Hypoactive bowel sounds present, not passing flatus yet but per pt he keeps burping. Bilateral LE swelling R >L. Up to the BR with FWW and SBA and he tolerated well. Voiding with moderate amount. Uses BiPAP @ night but didn't bring it , he stated he's ok without it tonight but can use oxygen 2L/NC. Needs attended. Call light within reach. Hourly rounding practiced.

## 2018-10-02 NOTE — CARE PLAN
Problem: Safety  Goal: Will remain free from injury    Intervention: Provide assistance with mobility  Up to the BR with FWW, steady on his feet.     Goal: Will remain free from falls    Intervention: Assess risk factors for falls  Calls appropriately. Call light within reach. Hourly rounding practiced.      Problem: Infection  Goal: Will remain free from infection  Outcome: PROGRESSING AS EXPECTED  Educated on hand hygiene. Surgical incision approximated. No sign and symptom of infection noted.    Problem: Bowel/Gastric:  Goal: Normal bowel function is maintained or improved  Bowel sounds present. Not passing flatus yes.    Problem: Pain Management  Goal: Pain level will decrease to patient's comfort goal  Pain well controlled with oxy IR 10 mg tab.

## 2018-10-02 NOTE — CARE PLAN
Problem: Knowledge Deficit  Goal: Knowledge of disease process/condition, treatment plan, diagnostic tests, and medications will improve  Plan of care for shift discussed with pt. Pt anxious to go home    Problem: Discharge Barriers/Planning  Goal: Patient's continuum of care needs will be met  DC home this afternoon per md order.    Problem: Pain Management  Goal: Pain level will decrease to patient's comfort goal  oxycodone provided for pain control

## 2018-10-03 NOTE — PROGRESS NOTES
Pt aao x 4, on room air, up ambulating in room with steady gait. Tolerating regular diet. Oxycodone provided for pain control. Voiding up to toilet. Lap stab x 8 to abdomen with dressings. Pt reports he feels comfortable to go home when his son gets off work today.

## 2018-10-03 NOTE — PROGRESS NOTES
Discharging Patient home per physician order.  Discharged with son--left to vehicle via wheelchair with RN.  Demonstrated understanding of discharge instructions, follow up appointments, home medications, prescriptions, home care for surgical wound, and nursing care instructions for activity and diet.  Ambulating without assistance, voiding without difficulty, pain well controlled, tolerating oral medications, oxygen saturation greater than 90% , tolerating diet.   Educational handouts re: MD instructions and worsening symptoms given and discussed.  Verbalized understanding of discharge instructions and educational handouts.  All questions answered.  Belongings with patient at time of discharge.

## 2018-10-09 ENCOUNTER — PATIENT OUTREACH (OUTPATIENT)
Dept: HEALTH INFORMATION MANAGEMENT | Facility: OTHER | Age: 63
End: 2018-10-09

## 2018-10-09 NOTE — PROGRESS NOTES
Outcome: Declined. pt states that he always do his flu shot at the end of Oct. for the past 25 years. upset that received over 4 calls about it. He will call back or check with local pharmary to get zoster   and flu at the same time.    Please transfer to Patient Outreach Team at [phone] when patient returns call.  .  WebIZ checked and Epic Updated: yes    Health[facility] verified: yes    Attempt: 1

## 2018-11-05 DIAGNOSIS — G89.29 CHRONIC PAIN OF LEFT THUMB: ICD-10-CM

## 2018-11-05 DIAGNOSIS — M79.645 CHRONIC PAIN OF LEFT THUMB: ICD-10-CM

## 2018-12-13 DIAGNOSIS — L21.9 DERMATITIS, SEBORRHEIC: ICD-10-CM

## 2018-12-13 RX ORDER — KETOCONAZOLE 20 MG/ML
SHAMPOO TOPICAL
Qty: 120 ML | Refills: 5 | Status: SHIPPED | OUTPATIENT
Start: 2018-12-13 | End: 2020-04-22

## 2019-01-31 ENCOUNTER — HOSPITAL ENCOUNTER (OUTPATIENT)
Dept: LAB | Facility: MEDICAL CENTER | Age: 64
End: 2019-01-31
Attending: FAMILY MEDICINE
Payer: MEDICARE

## 2019-01-31 ENCOUNTER — HOSPITAL ENCOUNTER (OUTPATIENT)
Dept: LAB | Facility: MEDICAL CENTER | Age: 64
End: 2019-01-31
Attending: PHYSICIAN ASSISTANT
Payer: MEDICARE

## 2019-01-31 DIAGNOSIS — R73.01 ELEVATED FASTING BLOOD SUGAR: ICD-10-CM

## 2019-01-31 DIAGNOSIS — Z12.5 PROSTATE CANCER SCREENING: ICD-10-CM

## 2019-01-31 DIAGNOSIS — M1A.09X1 IDIOPATHIC CHRONIC GOUT OF MULTIPLE SITES WITH TOPHUS: ICD-10-CM

## 2019-01-31 DIAGNOSIS — N18.30 CHRONIC KIDNEY DISEASE (CKD), STAGE III (MODERATE) (HCC): ICD-10-CM

## 2019-01-31 LAB
25(OH)D3 SERPL-MCNC: 21 NG/ML (ref 30–100)
ALBUMIN SERPL BCP-MCNC: 4 G/DL (ref 3.2–4.9)
ALBUMIN SERPL BCP-MCNC: 4.2 G/DL (ref 3.2–4.9)
ALBUMIN/GLOB SERPL: 1.4 G/DL
ALBUMIN/GLOB SERPL: 1.5 G/DL
ALP SERPL-CCNC: 48 U/L (ref 30–99)
ALP SERPL-CCNC: 49 U/L (ref 30–99)
ALT SERPL-CCNC: 15 U/L (ref 2–50)
ALT SERPL-CCNC: 16 U/L (ref 2–50)
ANION GAP SERPL CALC-SCNC: 7 MMOL/L (ref 0–11.9)
ANION GAP SERPL CALC-SCNC: 9 MMOL/L (ref 0–11.9)
AST SERPL-CCNC: 17 U/L (ref 12–45)
AST SERPL-CCNC: 19 U/L (ref 12–45)
BASOPHILS # BLD AUTO: 0.5 % (ref 0–1.8)
BASOPHILS # BLD: 0.03 K/UL (ref 0–0.12)
BILIRUB SERPL-MCNC: 1.7 MG/DL (ref 0.1–1.5)
BILIRUB SERPL-MCNC: 1.7 MG/DL (ref 0.1–1.5)
BUN SERPL-MCNC: 21 MG/DL (ref 8–22)
BUN SERPL-MCNC: 22 MG/DL (ref 8–22)
CALCIUM SERPL-MCNC: 9 MG/DL (ref 8.5–10.5)
CALCIUM SERPL-MCNC: 9.3 MG/DL (ref 8.5–10.5)
CHLORIDE SERPL-SCNC: 106 MMOL/L (ref 96–112)
CHLORIDE SERPL-SCNC: 108 MMOL/L (ref 96–112)
CHOLEST SERPL-MCNC: 161 MG/DL (ref 100–199)
CO2 SERPL-SCNC: 26 MMOL/L (ref 20–33)
CO2 SERPL-SCNC: 27 MMOL/L (ref 20–33)
CREAT SERPL-MCNC: 1.29 MG/DL (ref 0.5–1.4)
CREAT SERPL-MCNC: 1.31 MG/DL (ref 0.5–1.4)
EOSINOPHIL # BLD AUTO: 0.46 K/UL (ref 0–0.51)
EOSINOPHIL NFR BLD: 8 % (ref 0–6.9)
ERYTHROCYTE [DISTWIDTH] IN BLOOD BY AUTOMATED COUNT: 48.2 FL (ref 35.9–50)
EST. AVERAGE GLUCOSE BLD GHB EST-MCNC: 103 MG/DL
FOLATE SERPL-MCNC: 20.2 NG/ML
GLOBULIN SER CALC-MCNC: 2.8 G/DL (ref 1.9–3.5)
GLOBULIN SER CALC-MCNC: 2.9 G/DL (ref 1.9–3.5)
GLUCOSE SERPL-MCNC: 94 MG/DL (ref 65–99)
GLUCOSE SERPL-MCNC: 95 MG/DL (ref 65–99)
HBA1C MFR BLD: 5.2 % (ref 0–5.6)
HCT VFR BLD AUTO: 47.5 % (ref 42–52)
HDLC SERPL-MCNC: 47 MG/DL
HGB BLD-MCNC: 15.8 G/DL (ref 14–18)
IMM GRANULOCYTES # BLD AUTO: 0.01 K/UL (ref 0–0.11)
IMM GRANULOCYTES NFR BLD AUTO: 0.2 % (ref 0–0.9)
IRON SATN MFR SERPL: 25 % (ref 15–55)
IRON SERPL-MCNC: 93 UG/DL (ref 50–180)
LDLC SERPL CALC-MCNC: 81 MG/DL
LYMPHOCYTES # BLD AUTO: 1.67 K/UL (ref 1–4.8)
LYMPHOCYTES NFR BLD: 29.1 % (ref 22–41)
MCH RBC QN AUTO: 31.9 PG (ref 27–33)
MCHC RBC AUTO-ENTMCNC: 33.3 G/DL (ref 33.7–35.3)
MCV RBC AUTO: 95.8 FL (ref 81.4–97.8)
MONOCYTES # BLD AUTO: 0.45 K/UL (ref 0–0.85)
MONOCYTES NFR BLD AUTO: 7.9 % (ref 0–13.4)
NEUTROPHILS # BLD AUTO: 3.11 K/UL (ref 1.82–7.42)
NEUTROPHILS NFR BLD: 54.3 % (ref 44–72)
NRBC # BLD AUTO: 0 K/UL
NRBC BLD-RTO: 0 /100 WBC
PLATELET # BLD AUTO: 146 K/UL (ref 164–446)
PMV BLD AUTO: 10.6 FL (ref 9–12.9)
POTASSIUM SERPL-SCNC: 3.6 MMOL/L (ref 3.6–5.5)
POTASSIUM SERPL-SCNC: 3.8 MMOL/L (ref 3.6–5.5)
PREALB SERPL-MCNC: 28 MG/DL (ref 18–38)
PROT SERPL-MCNC: 6.9 G/DL (ref 6–8.2)
PROT SERPL-MCNC: 7 G/DL (ref 6–8.2)
PSA SERPL-MCNC: 1.82 NG/ML (ref 0–4)
RBC # BLD AUTO: 4.96 M/UL (ref 4.7–6.1)
SODIUM SERPL-SCNC: 140 MMOL/L (ref 135–145)
SODIUM SERPL-SCNC: 143 MMOL/L (ref 135–145)
TIBC SERPL-MCNC: 365 UG/DL (ref 250–450)
TRANSFERRIN SERPL-MCNC: 251 MG/DL (ref 200–370)
TRIGL SERPL-MCNC: 164 MG/DL (ref 0–149)
URATE SERPL-MCNC: 5.6 MG/DL (ref 2.5–8.3)
VIT B12 SERPL-MCNC: 314 PG/ML (ref 211–911)
WBC # BLD AUTO: 5.7 K/UL (ref 4.8–10.8)

## 2019-01-31 PROCEDURE — 84466 ASSAY OF TRANSFERRIN: CPT

## 2019-01-31 PROCEDURE — 83036 HEMOGLOBIN GLYCOSYLATED A1C: CPT

## 2019-01-31 PROCEDURE — 84153 ASSAY OF PSA TOTAL: CPT

## 2019-01-31 PROCEDURE — 84425 ASSAY OF VITAMIN B-1: CPT

## 2019-01-31 PROCEDURE — 36415 COLL VENOUS BLD VENIPUNCTURE: CPT

## 2019-01-31 PROCEDURE — 80061 LIPID PANEL: CPT

## 2019-01-31 PROCEDURE — 83540 ASSAY OF IRON: CPT

## 2019-01-31 PROCEDURE — 80053 COMPREHEN METABOLIC PANEL: CPT

## 2019-01-31 PROCEDURE — 80053 COMPREHEN METABOLIC PANEL: CPT | Mod: 91

## 2019-01-31 PROCEDURE — 84550 ASSAY OF BLOOD/URIC ACID: CPT

## 2019-01-31 PROCEDURE — 82746 ASSAY OF FOLIC ACID SERUM: CPT

## 2019-01-31 PROCEDURE — 85025 COMPLETE CBC W/AUTO DIFF WBC: CPT

## 2019-01-31 PROCEDURE — 84134 ASSAY OF PREALBUMIN: CPT

## 2019-01-31 PROCEDURE — 83550 IRON BINDING TEST: CPT

## 2019-01-31 PROCEDURE — 82306 VITAMIN D 25 HYDROXY: CPT

## 2019-01-31 PROCEDURE — 82607 VITAMIN B-12: CPT

## 2019-02-05 LAB — VIT B1 BLD-MCNC: 169 NMOL/L (ref 70–180)

## 2019-02-25 ENCOUNTER — TELEPHONE (OUTPATIENT)
Dept: MEDICAL GROUP | Facility: MEDICAL CENTER | Age: 64
End: 2019-02-25

## 2019-02-25 NOTE — TELEPHONE ENCOUNTER
ESTABLISHED PATIENT PRE-VISIT PLANNING     Patient was NOT contacted to complete PVP.     Note: Patient will not be contacted if there is no indication to call.     1.  Reviewed notes from the last few office visits within the medical group: Yes    2.  If any orders were placed at last visit or intended to be done for this visit (i.e. 6 mos follow-up), do we have Results/Consult Notes?        •  Labs - Labs ordered, completed on 1/31/19 and results are in chart.   Note: If patient appointment is for lab review and patient did not complete labs, check with provider if OK to reschedule patient until labs completed.       •  Imaging - Imaging was not ordered at last office visit.       •  Referrals - Referral ordered, patient was seen and consult notes are in chart. Care Teams updated  YES.    3. Is this appointment scheduled as a Hospital Follow-Up? No    4.  Immunizations were updated in [employer] using WebIZ?: Yes       •  Web Iz Recommendations: Patient is up to date on all vaccines    5.  Patient is due for the following Health Maintenance Topics:   There are no preventive care reminders to display for this patient.    - Patient is up-to-date on all Health Maintenance topics. No records have been requested at this time.    6. Orders for overdue Health Maintenance topics pended in Pre-Charting? NO    7.  AHA (MDX) form printed for Provider? YES    8.  Patient was NOT informed to arrive 15 min prior to their scheduled appointment and bring in their medication bottles.

## 2019-02-26 ENCOUNTER — OFFICE VISIT (OUTPATIENT)
Dept: MEDICAL GROUP | Facility: MEDICAL CENTER | Age: 64
End: 2019-02-26
Payer: MEDICARE

## 2019-02-26 VITALS
TEMPERATURE: 97.5 F | WEIGHT: 281 LBS | HEART RATE: 83 BPM | DIASTOLIC BLOOD PRESSURE: 78 MMHG | HEIGHT: 73 IN | OXYGEN SATURATION: 95 % | SYSTOLIC BLOOD PRESSURE: 136 MMHG | BODY MASS INDEX: 37.24 KG/M2

## 2019-02-26 DIAGNOSIS — E66.9 OBESITY (BMI 30-39.9): ICD-10-CM

## 2019-02-26 DIAGNOSIS — N18.30 CHRONIC KIDNEY DISEASE (CKD), STAGE III (MODERATE) (HCC): ICD-10-CM

## 2019-02-26 DIAGNOSIS — I10 ESSENTIAL HYPERTENSION: ICD-10-CM

## 2019-02-26 DIAGNOSIS — M1A.09X1 IDIOPATHIC CHRONIC GOUT OF MULTIPLE SITES WITH TOPHUS: ICD-10-CM

## 2019-02-26 PROCEDURE — 8041 PR SCP AHA: Performed by: FAMILY MEDICINE

## 2019-02-26 PROCEDURE — 99214 OFFICE O/P EST MOD 30 MIN: CPT | Performed by: FAMILY MEDICINE

## 2019-02-26 ASSESSMENT — PATIENT HEALTH QUESTIONNAIRE - PHQ9: CLINICAL INTERPRETATION OF PHQ2 SCORE: 0

## 2019-02-26 NOTE — PROGRESS NOTES
Subjective:   Montse Alberto is a 63 y.o. male here today for chronic kidney disease, hypertension, gout, obesity    Chronic kidney disease (CKD), stage III (moderate)  Patient's GFR is 55, which is comparable to previous GFR readings.    HTN (hypertension)  Patient is currently on triamterene-hydrochlorothiazide 37.5-25 mg daily.    Idiopathic chronic gout of multiple sites with tophus  Taking allopurinol 300 mg daily, no severe gout flares recently.    Results for MONTSE ALBERTO (MRN [id number]) as of 2/26/2019 07:58   Ref. Range 11/28/2017 09:06 1/31/2019 06:21   Uric Acid Latest Ref Range: 2.5 - 8.3 mg/dL 9.2 (H) 5.6       Obesity (BMI 30-39.9)  He is s/p gastric sleeve surgery in 2014 and being followed by Dr. Carmen.         Current medicines (including changes today)  Current Outpatient Prescriptions   Medication Sig Dispense Refill   • ketoconazole (NIZORAL) 2 % shampoo APPLY 1 APPLICATION TWICE DAILY 120 mL 5   • Diclofenac Sodium 1 % Gel Apply 1 g to skin as directed 2 Times a Day. 100 g 5   • NON SPECIFIED Apply 1 Each to affected area(s) 1 time daily as needed. Baclofen with Lidocaine ointment from doctor's office from 3 years ago     • allopurinol (ZYLOPRIM) 300 MG Tab Take 1 Tab by mouth every day. 90 Tab 3   • omeprazole (PRILOSEC) 20 MG delayed-release capsule TAKE ONE CAPSULE BY MOUTH EVERY DAY 90 Cap 3   • triamterene-hctz (MAXZIDE-25/DYAZIDE) 37.5-25 MG Tab TAKE 1 TABLET BY MOUTH EVERY DAY 90 Tab 3   • vitamin D (CHOLECALCIFEROL) 1000 UNIT Tab Take 2,000 Units by mouth every day.       No current facility-administered medications for this visit.      He  has a past medical history of Arthritis; Chronic venous insufficiency (9/4/2014); Dental disorder (12/17/14); GERD (gastroesophageal reflux disease); Headache, classical migraine; Heart burn; Hypertension; Indigestion; MEDICAL HOME; Pain (12/17/14); Sleep apnea; and Snoring. He also has no past medical history of Anesthesia or  "Cold.    ROS   No chest pain, no shortness of breath       Objective:     Blood pressure 136/78, pulse 83, temperature 36.4 °C (97.5 °F), height 1.854 m (6' 1\"), weight (!) 127.5 kg (281 lb), SpO2 95 %. Body mass index is 37.07 kg/m².   Physical Exam:  Constitutional: Alert, no distress.  Skin: Warm, dry, good turgor, no rashes in visible areas.  Eye: Equal, round and reactive, conjunctiva clear, lids normal.  Psych: Alert and oriented x3, normal affect and mood.        Assessment and Plan:   The following treatment plan was discussed    1. Chronic kidney disease (CKD), stage III (moderate) ([facility])  Stable.  Continue to monitor with labs.  Patient gets labs through his bariatric surgeon every 6 months.  We will see patient in 6 months for his annual and then annually after that.    2. Essential hypertension  Controlled.  Continue current medication.  Follow-up in 6 months for annual visit and then annually after that.    3. Idiopathic chronic gout of multiple sites with tophus  Controlled uric acid without any gout attacks.  Continue allopurinol.    4. Obesity (BMI 30-39.9)  Advised patient to start aerobic exercise with recumbent bike at home.      Followup: Return in about 6 months (around 8/26/2019) for Annual.         "

## 2019-02-26 NOTE — ASSESSMENT & PLAN NOTE
He is s/p gastric sleeve surgery in 2014 and being followed by Dr. Carmen.  
Patient is currently on triamterene-hydrochlorothiazide 37.5-25 mg daily.  
Patient's GFR is 55, which is comparable to previous GFR readings.  
Taking allopurinol 300 mg daily, no severe gout flares recently.    Results for SILVER MONTSE ALAS (MRN [id number]) as of 2/26/2019 07:58   Ref. Range 11/28/2017 09:06 1/31/2019 06:21   Uric Acid Latest Ref Range: 2.5 - 8.3 mg/dL 9.2 (H) 5.6     
no

## 2019-05-10 ENCOUNTER — HOSPITAL ENCOUNTER (OUTPATIENT)
Dept: LAB | Facility: MEDICAL CENTER | Age: 64
End: 2019-05-10
Attending: NURSE PRACTITIONER
Payer: MEDICARE

## 2019-05-10 LAB
25(OH)D3 SERPL-MCNC: 27 NG/ML (ref 30–100)
ALBUMIN SERPL BCP-MCNC: 4 G/DL (ref 3.2–4.9)
ALBUMIN/GLOB SERPL: 1.5 G/DL
ALP SERPL-CCNC: 50 U/L (ref 30–99)
ALT SERPL-CCNC: 16 U/L (ref 2–50)
ANION GAP SERPL CALC-SCNC: 7 MMOL/L (ref 0–11.9)
AST SERPL-CCNC: 16 U/L (ref 12–45)
BASOPHILS # BLD AUTO: 0.3 % (ref 0–1.8)
BASOPHILS # BLD: 0.02 K/UL (ref 0–0.12)
BILIRUB SERPL-MCNC: 1.7 MG/DL (ref 0.1–1.5)
BUN SERPL-MCNC: 24 MG/DL (ref 8–22)
CALCIUM SERPL-MCNC: 8.9 MG/DL (ref 8.5–10.5)
CHLORIDE SERPL-SCNC: 107 MMOL/L (ref 96–112)
CHOLEST SERPL-MCNC: 155 MG/DL (ref 100–199)
CO2 SERPL-SCNC: 26 MMOL/L (ref 20–33)
CREAT SERPL-MCNC: 1.17 MG/DL (ref 0.5–1.4)
EOSINOPHIL # BLD AUTO: 0.39 K/UL (ref 0–0.51)
EOSINOPHIL NFR BLD: 6.5 % (ref 0–6.9)
ERYTHROCYTE [DISTWIDTH] IN BLOOD BY AUTOMATED COUNT: 48.6 FL (ref 35.9–50)
FASTING STATUS PATIENT QL REPORTED: NORMAL
FOLATE SERPL-MCNC: >23.6 NG/ML
GLOBULIN SER CALC-MCNC: 2.6 G/DL (ref 1.9–3.5)
GLUCOSE SERPL-MCNC: 95 MG/DL (ref 65–99)
HCT VFR BLD AUTO: 46.6 % (ref 42–52)
HDLC SERPL-MCNC: 46 MG/DL
HGB BLD-MCNC: 15.5 G/DL (ref 14–18)
IMM GRANULOCYTES # BLD AUTO: 0.01 K/UL (ref 0–0.11)
IMM GRANULOCYTES NFR BLD AUTO: 0.2 % (ref 0–0.9)
IRON SATN MFR SERPL: 51 % (ref 15–55)
IRON SERPL-MCNC: 166 UG/DL (ref 50–180)
LDLC SERPL CALC-MCNC: 76 MG/DL
LYMPHOCYTES # BLD AUTO: 1.79 K/UL (ref 1–4.8)
LYMPHOCYTES NFR BLD: 29.9 % (ref 22–41)
MCH RBC QN AUTO: 31.6 PG (ref 27–33)
MCHC RBC AUTO-ENTMCNC: 33.3 G/DL (ref 33.7–35.3)
MCV RBC AUTO: 95.1 FL (ref 81.4–97.8)
MONOCYTES # BLD AUTO: 0.48 K/UL (ref 0–0.85)
MONOCYTES NFR BLD AUTO: 8 % (ref 0–13.4)
NEUTROPHILS # BLD AUTO: 3.3 K/UL (ref 1.82–7.42)
NEUTROPHILS NFR BLD: 55.1 % (ref 44–72)
NRBC # BLD AUTO: 0 K/UL
NRBC BLD-RTO: 0 /100 WBC
PLATELET # BLD AUTO: 152 K/UL (ref 164–446)
PMV BLD AUTO: 10.6 FL (ref 9–12.9)
POTASSIUM SERPL-SCNC: 3.7 MMOL/L (ref 3.6–5.5)
PREALB SERPL-MCNC: 25 MG/DL (ref 18–38)
PROT SERPL-MCNC: 6.6 G/DL (ref 6–8.2)
RBC # BLD AUTO: 4.9 M/UL (ref 4.7–6.1)
SODIUM SERPL-SCNC: 140 MMOL/L (ref 135–145)
TIBC SERPL-MCNC: 323 UG/DL (ref 250–450)
TRANSFERRIN SERPL-MCNC: 230 MG/DL (ref 200–370)
TRIGL SERPL-MCNC: 163 MG/DL (ref 0–149)
VIT B12 SERPL-MCNC: 291 PG/ML (ref 211–911)
WBC # BLD AUTO: 6 K/UL (ref 4.8–10.8)

## 2019-05-10 PROCEDURE — 84207 ASSAY OF VITAMIN B-6: CPT

## 2019-05-10 PROCEDURE — 83540 ASSAY OF IRON: CPT

## 2019-05-10 PROCEDURE — 84425 ASSAY OF VITAMIN B-1: CPT

## 2019-05-10 PROCEDURE — 80053 COMPREHEN METABOLIC PANEL: CPT

## 2019-05-10 PROCEDURE — 82607 VITAMIN B-12: CPT

## 2019-05-10 PROCEDURE — 84252 ASSAY OF VITAMIN B-2: CPT

## 2019-05-10 PROCEDURE — 83550 IRON BINDING TEST: CPT

## 2019-05-10 PROCEDURE — 85025 COMPLETE CBC W/AUTO DIFF WBC: CPT

## 2019-05-10 PROCEDURE — 84466 ASSAY OF TRANSFERRIN: CPT

## 2019-05-10 PROCEDURE — 82306 VITAMIN D 25 HYDROXY: CPT

## 2019-05-10 PROCEDURE — 36415 COLL VENOUS BLD VENIPUNCTURE: CPT

## 2019-05-10 PROCEDURE — 80061 LIPID PANEL: CPT

## 2019-05-10 PROCEDURE — 82746 ASSAY OF FOLIC ACID SERUM: CPT

## 2019-05-10 PROCEDURE — 84134 ASSAY OF PREALBUMIN: CPT

## 2019-05-13 LAB — VIT B6 SERPL-MCNC: 56.4 NMOL/L (ref 20–125)

## 2019-05-14 LAB — VIT B1 BLD-MCNC: 141 NMOL/L (ref 70–180)

## 2019-05-16 LAB — VIT B2 SERPL-SCNC: 32 NMOL/L (ref 5–50)

## 2019-05-22 ENCOUNTER — HOSPITAL ENCOUNTER (OUTPATIENT)
Dept: RADIOLOGY | Facility: MEDICAL CENTER | Age: 64
End: 2019-05-22
Attending: NURSE PRACTITIONER
Payer: MEDICARE

## 2019-05-22 DIAGNOSIS — Z98.890 H/O HERNIA REPAIR: ICD-10-CM

## 2019-05-22 DIAGNOSIS — R10.9 LEFT SIDED ABDOMINAL PAIN: ICD-10-CM

## 2019-05-22 DIAGNOSIS — Z87.19 PERSONAL HISTORY OF DIGESTIVE DISEASE: ICD-10-CM

## 2019-05-22 DIAGNOSIS — Z87.19 H/O HERNIA REPAIR: ICD-10-CM

## 2019-05-22 PROCEDURE — 74177 CT ABD & PELVIS W/CONTRAST: CPT

## 2019-05-22 PROCEDURE — 700117 HCHG RX CONTRAST REV CODE 255: Performed by: NURSE PRACTITIONER

## 2019-05-22 RX ADMIN — IOHEXOL 25 ML: 240 INJECTION, SOLUTION INTRATHECAL; INTRAVASCULAR; INTRAVENOUS; ORAL at 10:31

## 2019-05-22 RX ADMIN — IOHEXOL 100 ML: 350 INJECTION, SOLUTION INTRAVENOUS at 10:32

## 2019-06-07 RX ORDER — OMEPRAZOLE 20 MG/1
CAPSULE, DELAYED RELEASE ORAL
Qty: 90 CAP | Refills: 3 | Status: SHIPPED | OUTPATIENT
Start: 2019-06-07 | End: 2020-05-26

## 2019-06-07 RX ORDER — TRIAMTERENE AND HYDROCHLOROTHIAZIDE 37.5; 25 MG/1; MG/1
TABLET ORAL
Qty: 90 TAB | Refills: 3 | Status: SHIPPED | OUTPATIENT
Start: 2019-06-07 | End: 2020-05-26

## 2019-06-17 ENCOUNTER — PATIENT MESSAGE (OUTPATIENT)
Dept: MEDICAL GROUP | Facility: MEDICAL CENTER | Age: 64
End: 2019-06-17

## 2019-06-18 RX ORDER — AMOXICILLIN 500 MG/1
2000 CAPSULE ORAL
Qty: 16 CAP | Refills: 0 | Status: SHIPPED | OUTPATIENT
Start: 2019-06-18 | End: 2019-06-18

## 2019-06-18 RX ORDER — ALLOPURINOL 300 MG/1
TABLET ORAL
Qty: 90 TAB | Refills: 3 | Status: SHIPPED | OUTPATIENT
Start: 2019-06-18 | End: 2020-06-06

## 2019-07-17 DIAGNOSIS — Z01.810 PRE-OPERATIVE CARDIOVASCULAR EXAMINATION: ICD-10-CM

## 2019-07-17 DIAGNOSIS — Z01.812 PRE-OPERATIVE LABORATORY EXAMINATION: ICD-10-CM

## 2019-07-17 LAB
ANION GAP SERPL CALC-SCNC: 7 MMOL/L (ref 0–11.9)
BUN SERPL-MCNC: 21 MG/DL (ref 8–22)
CALCIUM SERPL-MCNC: 9.1 MG/DL (ref 8.5–10.5)
CHLORIDE SERPL-SCNC: 106 MMOL/L (ref 96–112)
CO2 SERPL-SCNC: 27 MMOL/L (ref 20–33)
CREAT SERPL-MCNC: 1.33 MG/DL (ref 0.5–1.4)
EKG IMPRESSION: NORMAL
ERYTHROCYTE [DISTWIDTH] IN BLOOD BY AUTOMATED COUNT: 48.1 FL (ref 35.9–50)
GLUCOSE SERPL-MCNC: 88 MG/DL (ref 65–99)
HCT VFR BLD AUTO: 46.1 % (ref 42–52)
HGB BLD-MCNC: 15.5 G/DL (ref 14–18)
MCH RBC QN AUTO: 32 PG (ref 27–33)
MCHC RBC AUTO-ENTMCNC: 33.6 G/DL (ref 33.7–35.3)
MCV RBC AUTO: 95.1 FL (ref 81.4–97.8)
PLATELET # BLD AUTO: 162 K/UL (ref 164–446)
PMV BLD AUTO: 10.4 FL (ref 9–12.9)
POTASSIUM SERPL-SCNC: 3.9 MMOL/L (ref 3.6–5.5)
RBC # BLD AUTO: 4.85 M/UL (ref 4.7–6.1)
SODIUM SERPL-SCNC: 140 MMOL/L (ref 135–145)
WBC # BLD AUTO: 6.2 K/UL (ref 4.8–10.8)

## 2019-07-17 PROCEDURE — 80048 BASIC METABOLIC PNL TOTAL CA: CPT

## 2019-07-17 PROCEDURE — 93005 ELECTROCARDIOGRAM TRACING: CPT

## 2019-07-17 PROCEDURE — 93010 ELECTROCARDIOGRAM REPORT: CPT | Performed by: INTERNAL MEDICINE

## 2019-07-17 PROCEDURE — 36415 COLL VENOUS BLD VENIPUNCTURE: CPT

## 2019-07-17 PROCEDURE — 85027 COMPLETE CBC AUTOMATED: CPT

## 2019-08-07 ENCOUNTER — ANESTHESIA (OUTPATIENT)
Dept: SURGERY | Facility: MEDICAL CENTER | Age: 64
End: 2019-08-07
Payer: MEDICARE

## 2019-08-07 ENCOUNTER — ANESTHESIA EVENT (OUTPATIENT)
Dept: SURGERY | Facility: MEDICAL CENTER | Age: 64
End: 2019-08-07
Payer: MEDICARE

## 2019-08-07 ENCOUNTER — HOSPITAL ENCOUNTER (OUTPATIENT)
Facility: MEDICAL CENTER | Age: 64
End: 2019-08-07
Attending: COLON & RECTAL SURGERY | Admitting: COLON & RECTAL SURGERY
Payer: MEDICARE

## 2019-08-07 ENCOUNTER — PATIENT MESSAGE (OUTPATIENT)
Dept: MEDICAL GROUP | Facility: MEDICAL CENTER | Age: 64
End: 2019-08-07

## 2019-08-07 ENCOUNTER — TELEPHONE (OUTPATIENT)
Dept: MEDICAL GROUP | Facility: MEDICAL CENTER | Age: 64
End: 2019-08-07

## 2019-08-07 VITALS
RESPIRATION RATE: 16 BRPM | HEART RATE: 80 BPM | BODY MASS INDEX: 37.25 KG/M2 | OXYGEN SATURATION: 97 % | SYSTOLIC BLOOD PRESSURE: 118 MMHG | HEIGHT: 73 IN | WEIGHT: 281.09 LBS | DIASTOLIC BLOOD PRESSURE: 84 MMHG | TEMPERATURE: 97.3 F

## 2019-08-07 DIAGNOSIS — G89.18 POST-OP PAIN: ICD-10-CM

## 2019-08-07 DIAGNOSIS — S81.809A OPEN WOUND OF LOWER EXTREMITY, UNSPECIFIED LATERALITY, INITIAL ENCOUNTER: ICD-10-CM

## 2019-08-07 LAB — PATHOLOGY CONSULT NOTE: NORMAL

## 2019-08-07 PROCEDURE — 160039 HCHG SURGERY MINUTES - EA ADDL 1 MIN LEVEL 3: Performed by: COLON & RECTAL SURGERY

## 2019-08-07 PROCEDURE — 160002 HCHG RECOVERY MINUTES (STAT): Performed by: COLON & RECTAL SURGERY

## 2019-08-07 PROCEDURE — 500423 HCHG DRESSING, ABD COMBINE: Performed by: COLON & RECTAL SURGERY

## 2019-08-07 PROCEDURE — 700105 HCHG RX REV CODE 258: Performed by: COLON & RECTAL SURGERY

## 2019-08-07 PROCEDURE — 501838 HCHG SUTURE GENERAL: Performed by: COLON & RECTAL SURGERY

## 2019-08-07 PROCEDURE — 160009 HCHG ANES TIME/MIN: Performed by: COLON & RECTAL SURGERY

## 2019-08-07 PROCEDURE — 501583 HCHG TROCAR, THRD CAN&SEAL 5X100: Performed by: COLON & RECTAL SURGERY

## 2019-08-07 PROCEDURE — 700102 HCHG RX REV CODE 250 W/ 637 OVERRIDE(OP): Performed by: ANESTHESIOLOGY

## 2019-08-07 PROCEDURE — 88304 TISSUE EXAM BY PATHOLOGIST: CPT

## 2019-08-07 PROCEDURE — 160048 HCHG OR STATISTICAL LEVEL 1-5: Performed by: COLON & RECTAL SURGERY

## 2019-08-07 PROCEDURE — 501445 HCHG STAPLER, SKIN DISP: Performed by: COLON & RECTAL SURGERY

## 2019-08-07 PROCEDURE — 700101 HCHG RX REV CODE 250: Performed by: COLON & RECTAL SURGERY

## 2019-08-07 PROCEDURE — 700111 HCHG RX REV CODE 636 W/ 250 OVERRIDE (IP)

## 2019-08-07 PROCEDURE — 501571 HCHG TROCAR, SEPARATOR 12X100: Performed by: COLON & RECTAL SURGERY

## 2019-08-07 PROCEDURE — 160025 RECOVERY II MINUTES (STATS): Performed by: COLON & RECTAL SURGERY

## 2019-08-07 PROCEDURE — 160035 HCHG PACU - 1ST 60 MINS PHASE I: Performed by: COLON & RECTAL SURGERY

## 2019-08-07 PROCEDURE — 700105 HCHG RX REV CODE 258: Performed by: ANESTHESIOLOGY

## 2019-08-07 PROCEDURE — 700111 HCHG RX REV CODE 636 W/ 250 OVERRIDE (IP): Performed by: ANESTHESIOLOGY

## 2019-08-07 PROCEDURE — 160036 HCHG PACU - EA ADDL 30 MINS PHASE I: Performed by: COLON & RECTAL SURGERY

## 2019-08-07 PROCEDURE — A9270 NON-COVERED ITEM OR SERVICE: HCPCS | Performed by: ANESTHESIOLOGY

## 2019-08-07 PROCEDURE — 700101 HCHG RX REV CODE 250: Performed by: ANESTHESIOLOGY

## 2019-08-07 PROCEDURE — 160028 HCHG SURGERY MINUTES - 1ST 30 MINS LEVEL 3: Performed by: COLON & RECTAL SURGERY

## 2019-08-07 PROCEDURE — 502571 HCHG PACK, LAP CHOLE: Performed by: COLON & RECTAL SURGERY

## 2019-08-07 PROCEDURE — 160046 HCHG PACU - 1ST 60 MINS PHASE II: Performed by: COLON & RECTAL SURGERY

## 2019-08-07 PROCEDURE — 500868 HCHG NEEDLE, SURGI(VARES): Performed by: COLON & RECTAL SURGERY

## 2019-08-07 PROCEDURE — 501570 HCHG TROCAR, SEPARATOR: Performed by: COLON & RECTAL SURGERY

## 2019-08-07 RX ORDER — SUCCINYLCHOLINE CHLORIDE 20 MG/ML
INJECTION INTRAMUSCULAR; INTRAVENOUS PRN
Status: DISCONTINUED | OUTPATIENT
Start: 2019-08-07 | End: 2019-08-07 | Stop reason: SURG

## 2019-08-07 RX ORDER — HYDRALAZINE HYDROCHLORIDE 20 MG/ML
5 INJECTION INTRAMUSCULAR; INTRAVENOUS
Status: DISCONTINUED | OUTPATIENT
Start: 2019-08-07 | End: 2019-08-07 | Stop reason: HOSPADM

## 2019-08-07 RX ORDER — SODIUM CHLORIDE, SODIUM LACTATE, POTASSIUM CHLORIDE, CALCIUM CHLORIDE 600; 310; 30; 20 MG/100ML; MG/100ML; MG/100ML; MG/100ML
INJECTION, SOLUTION INTRAVENOUS CONTINUOUS
Status: DISCONTINUED | OUTPATIENT
Start: 2019-08-07 | End: 2019-08-07 | Stop reason: HOSPADM

## 2019-08-07 RX ORDER — OXYCODONE AND ACETAMINOPHEN 10; 325 MG/1; MG/1
1 TABLET ORAL EVERY 6 HOURS PRN
Qty: 12 TAB | Refills: 0 | Status: SHIPPED | OUTPATIENT
Start: 2019-08-07 | End: 2019-08-10

## 2019-08-07 RX ORDER — HYDROMORPHONE HYDROCHLORIDE 1 MG/ML
0.1 INJECTION, SOLUTION INTRAMUSCULAR; INTRAVENOUS; SUBCUTANEOUS
Status: DISCONTINUED | OUTPATIENT
Start: 2019-08-07 | End: 2019-08-07 | Stop reason: HOSPADM

## 2019-08-07 RX ORDER — ACETAMINOPHEN 500 MG
1000 TABLET ORAL EVERY 6 HOURS PRN
COMMUNITY

## 2019-08-07 RX ORDER — ONDANSETRON 2 MG/ML
4 INJECTION INTRAMUSCULAR; INTRAVENOUS
Status: DISCONTINUED | OUTPATIENT
Start: 2019-08-07 | End: 2019-08-07 | Stop reason: HOSPADM

## 2019-08-07 RX ORDER — OXYCODONE HCL 5 MG/5 ML
5 SOLUTION, ORAL ORAL
Status: COMPLETED | OUTPATIENT
Start: 2019-08-07 | End: 2019-08-07

## 2019-08-07 RX ORDER — CEFAZOLIN SODIUM 1 G/3ML
INJECTION, POWDER, FOR SOLUTION INTRAMUSCULAR; INTRAVENOUS PRN
Status: DISCONTINUED | OUTPATIENT
Start: 2019-08-07 | End: 2019-08-07 | Stop reason: SURG

## 2019-08-07 RX ORDER — OXYCODONE HCL 5 MG/5 ML
10 SOLUTION, ORAL ORAL
Status: COMPLETED | OUTPATIENT
Start: 2019-08-07 | End: 2019-08-07

## 2019-08-07 RX ORDER — BUPIVACAINE HYDROCHLORIDE AND EPINEPHRINE 5; 5 MG/ML; UG/ML
INJECTION, SOLUTION EPIDURAL; INTRACAUDAL; PERINEURAL
Status: DISCONTINUED | OUTPATIENT
Start: 2019-08-07 | End: 2019-08-07 | Stop reason: HOSPADM

## 2019-08-07 RX ORDER — HYDROMORPHONE HYDROCHLORIDE 1 MG/ML
0.2 INJECTION, SOLUTION INTRAMUSCULAR; INTRAVENOUS; SUBCUTANEOUS
Status: DISCONTINUED | OUTPATIENT
Start: 2019-08-07 | End: 2019-08-07 | Stop reason: HOSPADM

## 2019-08-07 RX ORDER — HALOPERIDOL 5 MG/ML
1 INJECTION INTRAMUSCULAR
Status: DISCONTINUED | OUTPATIENT
Start: 2019-08-07 | End: 2019-08-07 | Stop reason: HOSPADM

## 2019-08-07 RX ORDER — ROCURONIUM BROMIDE 10 MG/ML
INJECTION, SOLUTION INTRAVENOUS PRN
Status: DISCONTINUED | OUTPATIENT
Start: 2019-08-07 | End: 2019-08-07 | Stop reason: SURG

## 2019-08-07 RX ORDER — LIDOCAINE HYDROCHLORIDE 10 MG/ML
INJECTION, SOLUTION EPIDURAL; INFILTRATION; INTRACAUDAL; PERINEURAL
Status: COMPLETED
Start: 2019-08-07 | End: 2019-08-07

## 2019-08-07 RX ORDER — ONDANSETRON 2 MG/ML
INJECTION INTRAMUSCULAR; INTRAVENOUS PRN
Status: DISCONTINUED | OUTPATIENT
Start: 2019-08-07 | End: 2019-08-07 | Stop reason: SURG

## 2019-08-07 RX ORDER — LABETALOL HYDROCHLORIDE 5 MG/ML
5 INJECTION, SOLUTION INTRAVENOUS
Status: DISCONTINUED | OUTPATIENT
Start: 2019-08-07 | End: 2019-08-07 | Stop reason: HOSPADM

## 2019-08-07 RX ORDER — DIPHENHYDRAMINE HYDROCHLORIDE 50 MG/ML
12.5 INJECTION INTRAMUSCULAR; INTRAVENOUS
Status: DISCONTINUED | OUTPATIENT
Start: 2019-08-07 | End: 2019-08-07 | Stop reason: HOSPADM

## 2019-08-07 RX ORDER — HYDROMORPHONE HYDROCHLORIDE 1 MG/ML
0.4 INJECTION, SOLUTION INTRAMUSCULAR; INTRAVENOUS; SUBCUTANEOUS
Status: DISCONTINUED | OUTPATIENT
Start: 2019-08-07 | End: 2019-08-07 | Stop reason: HOSPADM

## 2019-08-07 RX ORDER — MEPERIDINE HYDROCHLORIDE 25 MG/ML
12.5 INJECTION INTRAMUSCULAR; INTRAVENOUS; SUBCUTANEOUS
Status: DISCONTINUED | OUTPATIENT
Start: 2019-08-07 | End: 2019-08-07 | Stop reason: HOSPADM

## 2019-08-07 RX ORDER — MIDAZOLAM HYDROCHLORIDE 1 MG/ML
1 INJECTION INTRAMUSCULAR; INTRAVENOUS
Status: DISCONTINUED | OUTPATIENT
Start: 2019-08-07 | End: 2019-08-07 | Stop reason: HOSPADM

## 2019-08-07 RX ORDER — DEXAMETHASONE SODIUM PHOSPHATE 4 MG/ML
INJECTION, SOLUTION INTRA-ARTICULAR; INTRALESIONAL; INTRAMUSCULAR; INTRAVENOUS; SOFT TISSUE PRN
Status: DISCONTINUED | OUTPATIENT
Start: 2019-08-07 | End: 2019-08-07 | Stop reason: SURG

## 2019-08-07 RX ADMIN — FENTANYL CITRATE 100 MCG: 50 INJECTION, SOLUTION INTRAMUSCULAR; INTRAVENOUS at 09:01

## 2019-08-07 RX ADMIN — SUCCINYLCHOLINE CHLORIDE 200 MG: 20 INJECTION, SOLUTION INTRAMUSCULAR; INTRAVENOUS at 08:50

## 2019-08-07 RX ADMIN — PROPOFOL 200 MG: 10 INJECTION, EMULSION INTRAVENOUS at 08:50

## 2019-08-07 RX ADMIN — ONDANSETRON 4 MG: 2 INJECTION INTRAMUSCULAR; INTRAVENOUS at 09:12

## 2019-08-07 RX ADMIN — ROCURONIUM BROMIDE 50 MG: 10 INJECTION, SOLUTION INTRAVENOUS at 08:50

## 2019-08-07 RX ADMIN — SODIUM CHLORIDE, POTASSIUM CHLORIDE, SODIUM LACTATE AND CALCIUM CHLORIDE: 600; 310; 30; 20 INJECTION, SOLUTION INTRAVENOUS at 09:47

## 2019-08-07 RX ADMIN — OXYCODONE HYDROCHLORIDE 10 MG: 5 SOLUTION ORAL at 09:48

## 2019-08-07 RX ADMIN — DEXAMETHASONE SODIUM PHOSPHATE 8 MG: 4 INJECTION, SOLUTION INTRA-ARTICULAR; INTRALESIONAL; INTRAMUSCULAR; INTRAVENOUS; SOFT TISSUE at 08:50

## 2019-08-07 RX ADMIN — SODIUM CHLORIDE, POTASSIUM CHLORIDE, SODIUM LACTATE AND CALCIUM CHLORIDE: 600; 310; 30; 20 INJECTION, SOLUTION INTRAVENOUS at 07:53

## 2019-08-07 RX ADMIN — FENTANYL CITRATE 150 MCG: 50 INJECTION, SOLUTION INTRAMUSCULAR; INTRAVENOUS at 08:50

## 2019-08-07 RX ADMIN — SUGAMMADEX 200 MG: 100 INJECTION, SOLUTION INTRAVENOUS at 09:18

## 2019-08-07 RX ADMIN — Medication 0.5 ML: at 07:53

## 2019-08-07 RX ADMIN — LIDOCAINE HYDROCHLORIDE 100 MG: 20 INJECTION, SOLUTION INTRAVENOUS at 08:50

## 2019-08-07 RX ADMIN — CEFAZOLIN 3 G: 330 INJECTION, POWDER, FOR SOLUTION INTRAMUSCULAR; INTRAVENOUS at 08:50

## 2019-08-07 RX ADMIN — EPHEDRINE SULFATE 10 MG: 50 INJECTION INTRAMUSCULAR; INTRAVENOUS; SUBCUTANEOUS at 08:58

## 2019-08-07 RX ADMIN — LIDOCAINE HYDROCHLORIDE 0.5 ML: 10 INJECTION, SOLUTION EPIDURAL; INFILTRATION; INTRACAUDAL at 07:53

## 2019-08-07 ASSESSMENT — PAIN SCALES - GENERAL: PAIN_LEVEL: 4

## 2019-08-07 NOTE — ANESTHESIA POSTPROCEDURE EVALUATION
Patient: Max Alberto    Procedure Summary     Date:  08/07/19 Room / Location:  [place] [address] / SURGERY [facility]    Anesthesia Start:  0846 Anesthesia Stop:  0926    Procedures:       LAPAROSCOPY-DIAGNOSTIC (Abdomen)      LYSIS, ADHESIONS, LAPAROSCOPIC (Abdomen)      REPAIR, HERNIA, INCISIONAL- POSSIBLE (Abdomen) Diagnosis:  (adhesions soft tissue mass of abdominal)    Surgeon:  Julian Carmen M.D. Responsible Provider:  James Solomon D.O.    Anesthesia Type:  general ASA Status:  3          Final Anesthesia Type: general  Last vitals  BP   Blood Pressure : 115/72, NIBP: 122/85    Temp   36.9 °C (98.5 °F)    Pulse   Pulse: 80   Resp   13    SpO2   93 %      Anesthesia Post Evaluation    Patient location during evaluation: PACU  Patient participation: complete - patient participated  Level of consciousness: awake and alert  Pain score: 4    Airway patency: patent  Anesthetic complications: no  Cardiovascular status: hemodynamically stable  Respiratory status: acceptable  Hydration status: euvolemic    PONV: none           Nurse Pain Score: 7 (NPRS)

## 2019-08-07 NOTE — OP REPORT
DATE OF SERVICE:  08/07/2019    PREOPERATIVE DIAGNOSIS:  Left upper quadrant pain and bulge.    POSTOPERATIVE DIAGNOSES:  1.  Intra-abdominal adhesions.  2.  No evidence of ventral or incisional hernia.  3.  Subcutaneous 3 cm mass consistent with lipoma.    OPERATIONS PERFORMED:  1.  Diagnostic laparoscopy.  2.  Laparoscopic adhesiolysis.  3.  Excision of 3 cm subcutaneous left upper quadrant mass consistent with   lipoma.    SURGEON:  Julian Carmen MD    ANESTHESIOLOGIST:  James Solomon DO    ASSISTANT:  JASON Gale    INDICATIONS FOR PROCEDURE:  The patient has had persistent left upper quadrant   pain.  He has previously undergone an incisional hernia repair in that region   with placement of intraperitoneal lightweight mesh.  He has felt a bulge or   mass and pain with certain positions.  He comes today for diagnostic   laparoscopy to assess for the potential of hernia or other source of pain as   well as abdominal wall examination under anesthesia to evaluate the bulge or   protrusion.    DETAILS OF PROCEDURE:  After an extensive informed consent discussion and   process, the patient was brought to the operating room.  He was placed in a   supine position on the operating table.  After induction of general anesthesia   and placement of an endotracheal tube, the abdomen was prepped and draped in   the usual sterile fashion.  After administration of intravenous antibiotics, a   bladeless optical entry trocar was carefully introduced and pneumoperitoneum   safely established in the usual fashion.  An additional 5 mm bladeless trocar   was carefully introduced.  Careful examination demonstrated fairly dense   adhesions of omentum to the left upper quadrant area.  The energy device was   used to carefully lyse the adhesions safely.  There was no bowel adherent.  It   turned out to be omentum that was adherent in the area of previous incisions   and to the old mesh.  Actually once we began working,  these adhesions became   separable quite easily and ultimately gave us full and complete exposure of   the anterior abdominal wall.  Careful inspection of the wall demonstrated no   evidence of hernia whatsoever.  Palpation and examination throughout showed no   discernible hernia or other abnormality.  The mesh was in excellent position.    However, on palpation of the anterior abdominal wall in the region where we   had marked him preoperatively was indeed palpable subcutaneous mass and this   had no communication to the intraperitoneal side.  A small incision was made   directly over this mass and dissection was performed bluntly and this proved   to be a fairly deep, but fairly large 3 cm subcutaneous lipoma.  The lipoma   was delivered dissected free and its stalk was divided with cautery.  It was   passed off the field for pathology.  This appeared to be exactly in the area   where he described the bulge.    The pneumoperitoneum was allowed to fully escape.  The ports removed under   direct vision.  The port sites were irrigated and closed with Vicryl suture.    The wound site from the lipoma excision was closed in layers with Vicryl   suture and Steri-Strips were applied.    COUNTS:  Needle, sponge and instrument counts were correct.    ESTIMATED BLOOD LOSS:  Minimal.    COMPLICATIONS:  None.       ____________________________________     MD RO DOBSON / FRANK    DD:  08/07/2019 09:21:55  DT:  08/07/2019 09:49:47    D#:  [id number]  Job#:  [id number]

## 2019-08-07 NOTE — TELEPHONE ENCOUNTER
----- Message from Iza Gonsalves sent at 8/7/2019  2:43 PM PDT -----  Regarding: Referral needed  Wound Care  Contact: [phone] x [phone]  Good afternoon!  Our mutual patient mentioned in passing that he has a chronic non-healing lower extremity wound that he would like to have evaluated.  He will require an authorization through your office to be seen at the wound clinic.  Can you please have your staff look into it for him?  Thank you, Iza

## 2019-08-07 NOTE — ANESTHESIA QCDR
2019 [facility] Clinical Data Registry (for Quality Improvement)     Postoperative nausea/vomiting risk protocol (Adult = 18 yrs and Pediatric 3-17 yrs)- (430 and 463)  General inhalation anesthetic (NOT TIVA) with PONV risk factors: Yes  Provision of anti-emetic therapy with at least 2 different classes of agents: Yes   Patient DID NOT receive anti-emetic therapy and reason is documented in Medical Record:  N/A    Multimodal Pain Management- (AQI59)  Patient undergoing Elective Surgery (i.e. Outpatient, or ASC, or Prescheduled Surgery prior to Hospital Admission): Yes  Use of Multimodal Pain Management, two or more drugs and/or interventions, NOT including systemic opioids: Yes   Exception: Documented allergy to multiple classes of analgesics:  N/A    PACU assessment of acute postoperative pain prior to Anesthesia Care End- Applies to Patients Age = 18- (ABG7)  Initial PACU pain score is which of the following: < 7/10  Patient unable to report pain score: N/A    Post-anesthetic transfer of care checklist/protocol to PACU/ICU- (426 and 427)  Upon conclusion of case, patient transferred to which of the following locations: PACU/Non-ICU  Use of transfer checklist/protocol: Yes  Exclusion: Service Performed in Patient Hospital Room (and thus did not require transfer): N/A    PACU Reintubation- (AQI31)  General anesthesia requiring endotracheal intubation (ETT) along with subsequent extubation in OR or PACU: Yes  Required reintubation in the PACU: No   Extubation was a planned trial documented in the medical record prior to removal of the original airway device:  N/A    Unplanned admission to ICU related to anesthesia service up through end of PACU care- (MD51)  Unplanned admission to ICU (not initially anticipated at anesthesia start time): No

## 2019-08-07 NOTE — ANESTHESIA TIME REPORT
Anesthesia Start and Stop Event Times     Date Time Event    8/7/2019 0844 Ready for Procedure     0846 Anesthesia Start     0926 Anesthesia Stop        Responsible Staff  08/07/19    Name Role Begin End    James Solomon D.O. Anesth 0846 0926        Preop Diagnosis (Free Text):  Pre-op Diagnosis     LEFT upper QUADRANT PAIN        Preop Diagnosis (Codes):    Post op Diagnosis  Recurrent ventral hernia      Premium Reason  Non-Premium    Comments:

## 2019-08-07 NOTE — OR NURSING
Stab wounds intact with steri strips and bandaide.  Mid line oozing and reinforced.  Given dc instructions and verbalized understanding.

## 2019-08-07 NOTE — ANESTHESIA PREPROCEDURE EVALUATION
Relevant Problems   ANESTHESIA   (+) Sleep apnea with use of continuous positive airway pressure (CPAP)      CARDIAC   (+) Chronic venous insufficiency   (+) HTN (hypertension)   (+) Migraines      GI   (+) GERD (gastroesophageal reflux disease)         (+) Chronic kidney disease (CKD), stage III (moderate) (HCC)      Other   (+) Idiopathic chronic gout of multiple sites with tophus       Physical Exam    Airway   Mallampati: III  TM distance: >3 FB  Neck ROM: full       Cardiovascular - normal exam  Rhythm: regular  Rate: normal  (-) murmur     Dental - normal exam         Pulmonary - normal exam  Breath sounds clear to auscultation     Abdominal   (+) obese     Neurological - normal exam                 Anesthesia Plan    ASA 3   ASA physical status 3 criteria: morbid obesity - BMI greater than or equal to 40    Plan - general       Airway plan will be ETT        Induction: intravenous    Postoperative Plan: Postoperative administration of opioids is intended.    Pertinent diagnostic labs and testing reviewed    Informed Consent:    Anesthetic plan and risks discussed with patient.    Use of blood products discussed with: patient whom consented to blood products.

## 2019-08-07 NOTE — PROGRESS NOTES
Med Rec complete per Pt at bedside  Allergies Reviewed  No ABX in the last 14 days    Vit D held for surgery

## 2019-08-07 NOTE — DISCHARGE INSTRUCTIONS
ACTIVITY: Rest and take it easy for the first 24 hours.  A responsible adult is recommended to remain with you during that time.  It is normal to feel sleepy.  We encourage you to not do anything that requires balance, judgment or coordination.    MILD FLU-LIKE SYMPTOMS ARE NORMAL. YOU MAY EXPERIENCE GENERALIZED MUSCLE ACHES, THROAT IRRITATION, HEADACHE AND/OR SOME NAUSEA.    FOR 24 HOURS DO NOT:  Drive, operate machinery or run household appliances.  Drink beer or alcoholic beverages.   Make important decisions or sign legal documents.    SPECIAL INSTRUCTIONS:     1. DIET: Upon discharge from the hospital you may resume your normal preoperative diet. Depending on how you are feeling and whether you have nausea or not, you may wish to stay with a bland diet for the first few days. However, you can advance this as quickly as you feel ready.     2. ACTIVITIES: After discharge from the hospital, you may resume full routine activities. However, there should be no heavy lifting (greater than 15 pounds) and no strenuous activities until after your follow-up visit. Otherwise, routine activities of daily living are acceptable.     3. DRIVING: You may drive whenever you are off pain medications and are able to perform the activities needed to drive, i.e. turning, bending, twisting, etc.     4. BATHING: You may get the wound wet at any time after leaving the hospital. You may shower, but do not submerge in a bath for at least a week. Dressings may come off after 48 hours.     5. BOWEL FUNCTION: Constipation is common after an operation, especially with pain medications. The combination of pain medication and decreased activity level can cause constipation in otherwise normal patients. If you feel this is occurring, take a laxative (Miralax, Milk of Magnesia, Ex-Lax, Senokot, etc.) until the problem has resolved.     6. PAIN MEDICATION: You will be given a prescription for pain medication at discharge. Please take these as  directed. It is important to remember not to take medications on an empty stomach as this may cause nausea.     7.CALL IF YOU HAVE: (1) Fevers to more than 101.0 F, (2) Unusual chest or leg pain, (3) Drainage or fluid from incision that may be foul smelling, increased tenderness or soreness at the wound or the wound edges are no longer together, redness or swelling at the incision site. Please do not hesitate to call with any other questions.     8. APPOINTMENT: Contact our office at [phone] for a follow-up appointment in 2 weeks following your procedure.     If you have any additional questions, please do not hesitate to call the office and speak to either myself or the physician on call.     Office address:   [facility].   [address] [place] [id number]    DIET: To avoid nausea, slowly advance diet as tolerated, avoiding spicy or greasy foods for the first day.  Add more substantial food to your diet according to your physician's instructions.  Babies can be fed formula or breast milk as soon as they are hungry.  INCREASE FLUIDS AND FIBER TO AVOID CONSTIPATION.    FOLLOW-UP APPOINTMENT:  A follow-up appointment should be arranged with your doctor in *2 weeks*; call to schedule.    You should CALL YOUR PHYSICIAN if you develop:  Fever greater than 101 degrees F.  Pain not relieved by medication, or persistent nausea or vomiting.  Excessive bleeding (blood soaking through dressing) or unexpected drainage from the wound.  Extreme redness or swelling around the incision site, drainage of pus or foul smelling drainage.  Inability to urinate or empty your bladder within 8 hours.  Problems with breathing or chest pain.    You should call 911 if you develop problems with breathing or chest pain.  If you are unable to contact your doctor or surgical center, you should go to the nearest emergency room or urgent care center.  Physician's telephone #: *Dr. Carmen [phone]*    If any  questions arise, call your doctor.  If your doctor is not available, please feel free to call the Surgical Center at [phone].  The Center is open Monday through Friday from 7AM to 7PM.  You can also call the HEALTH HOTLINE open 24 hours/day, 7 days/week and speak to a nurse at [phone], or toll free at [phone].    A registered nurse may call you a few days after your surgery to see how you are doing after your procedure.    MEDICATIONS: Resume taking daily medication.  Take prescribed pain medication with food.  If no medication is prescribed, you may take non-aspirin pain medication if needed.  PAIN MEDICATION CAN BE VERY CONSTIPATING.  Take a stool softener or laxative such as senokot, pericolace, or milk of magnesia if needed.    Prescription given for *Percocet*.  Last pain medication given at *9:48am*.    If your physician has prescribed pain medication that includes Acetaminophen (Tylenol), do not take additional Acetaminophen (Tylenol) while taking the prescribed medication.    Depression / Suicide Risk    As you are discharged from this [facility] facility, it is important to learn how to keep safe from harming yourself.    Recognize the warning signs:  · Abrupt changes in personality, positive or negative- including increase in energy   · Giving away possessions  · Change in eating patterns- significant weight changes-  positive or negative  · Change in sleeping patterns- unable to sleep or sleeping all the time   · Unwillingness or inability to communicate  · Depression  · Unusual sadness, discouragement and loneliness  · Talk of wanting to die  · Neglect of personal appearance   · Rebelliousness- reckless behavior  · Withdrawal from people/activities they love  · Confusion- inability to concentrate     If you or a loved one observes any of these behaviors or has concerns about self-harm, here's what you can do:  · Talk about it- your feelings and reasons for harming  yourself  · Remove any means that you might use to hurt yourself (examples: pills, rope, extension cords, firearm)  · Get professional help from the community (Mental Health, Substance Abuse, psychological counseling)  · Do not be alone:Call your Safe Contact- someone whom you trust who will be there for you.  · Call your local CRISIS HOTLINE 243-6752 or 019-343-6516  · Call your local Children's Mobile Crisis Response Team [place] (717) 785-5966 or www.[employer]  · Call the toll free National Suicide Prevention Hotlines   · National Suicide Prevention Lifeline 405-881-KCJX (5677)  · National Hope Line Network 800-SUICIDE (128-5443)

## 2019-08-08 NOTE — TELEPHONE ENCOUNTER
From: Max Alberto  To: Tim Serna M.D.  Sent: 8/7/2019 9:05 PM PDT  Subject: Non-Urgent Medical Question    I just had another hernia surgery today.   My question is, Do I need to get a Wellness check still scheduled which October will be 12 months from my last one? Also Dr Carmen suggested that I see a Wound Specialist for the wound on my right leg shin that never really completely heals (it's where the fluid in my leg was draining when I had excessive fluid build-up). If so can you please refer me to a specialist.  Thank you   Max

## 2019-08-27 ENCOUNTER — OFFICE VISIT (OUTPATIENT)
Dept: WOUND CARE | Facility: MEDICAL CENTER | Age: 64
End: 2019-08-27
Attending: FAMILY MEDICINE
Payer: MEDICARE

## 2019-08-27 ENCOUNTER — TELEPHONE (OUTPATIENT)
Dept: MEDICAL GROUP | Facility: MEDICAL CENTER | Age: 64
End: 2019-08-27

## 2019-08-27 VITALS
OXYGEN SATURATION: 97 % | DIASTOLIC BLOOD PRESSURE: 92 MMHG | SYSTOLIC BLOOD PRESSURE: 127 MMHG | HEART RATE: 81 BPM | TEMPERATURE: 98 F | RESPIRATION RATE: 18 BRPM

## 2019-08-27 DIAGNOSIS — E66.9 OBESITY (BMI 30-39.9): ICD-10-CM

## 2019-08-27 DIAGNOSIS — I87.2 CHRONIC VENOUS INSUFFICIENCY: ICD-10-CM

## 2019-08-27 PROCEDURE — 99213 OFFICE O/P EST LOW 20 MIN: CPT

## 2019-08-27 PROCEDURE — 99213 OFFICE O/P EST LOW 20 MIN: CPT | Performed by: NURSE PRACTITIONER

## 2019-08-27 RX ORDER — AMOXICILLIN 500 MG/1
CAPSULE ORAL
Refills: 0 | COMMUNITY
Start: 2019-06-18 | End: 2020-06-26 | Stop reason: SDUPTHER

## 2019-08-27 ASSESSMENT — ENCOUNTER SYMPTOMS
CONSTIPATION: 0
NAUSEA: 0
NERVOUS/ANXIOUS: 0
VOMITING: 0
FEVER: 0
COUGH: 0
CHILLS: 0
SHORTNESS OF BREATH: 0
DIZZINESS: 0
DEPRESSION: 0
BACK PAIN: 1
DIARRHEA: 0
CLAUDICATION: 0

## 2019-08-27 NOTE — TELEPHONE ENCOUNTER
Future Appointments       Provider Department Center    9/3/2019 10:40 AM Tim Serna M.D.; [facility]          ANNUAL WELLNESS VISIT PRE-VISIT PLANNING WITHOUT OUTREACH    1.  Reviewed note from last office visit with PCP: YES    2.  If any orders were placed at last visit, do we have Results/Consult Notes?        •  Labs - Most recent labs ordered by different provider, pt completed 8/7/19.  Note: If patient appointment is for lab review and patient did not complete labs, check with provider if OK to reschedule patient until labs completed.       •  Imaging - Imaging was not ordered at last office visit.       •  Referrals - No referrals were ordered at last office visit.    3.  Immunizations were updated in [employer] using [employer]?: Yes       •  Web[employer] Recommendations: FLU, PREVNAR (PCV13) , TD and CPOX.        •  Is patient due for Tdap? NO       •  Is patient due for Shingrix? YES. Patient was not notified of copay/out of pocket cost.     4.  Patient is due for the following Health Maintenance Topics:   Health Maintenance Due   Topic Date Due   • HEPATITIS C SCREENING  1955   • IMM HEP B VACCINE (1 of 3 - Risk 3-dose series) 07/08/1973   • IMM PNEUMOCOCCAL VACCINE: 65+ Years (1 of 2 - PCV13) 07/08/2019   • Annual Wellness Visit  08/23/2019     5.  Reviewed/Updated the following with patient:       •   Preferred Pharmacy? NO       •   Preferred Lab? NO       •   Preferred Communication? NO       •   Allergies? NO       •   Medications? NO       •   Social History? NO       •   Family History (document living status of immediate family members and if + hx of  cancer, diabetes, hypertension, hyperlipidemia, heart attack, stroke) NO    6.  Care Team Updated:       •   DME Company (gait device, O2, CPAP, etc.): NO       •   Other Specialists (eye doctor, derm, GYN, cardiology, endo, etc): NO    7. Orders for overdue Health Maintenance topics pended in  Pre-Charting? NO    8.  Patient has the following Care Path diagnoses on Problem List:  NONE    9.  Patient was not advised: “This is a free wellness visit. The provider will screen for medical conditions to help you stay healthy. If you have other concerns to address you may be asked to discuss these at a separate visit or there may be an additional fee.”     10.  Patient was NOT informed to arrive 15 min prior to their scheduled appointment and bring in their medication bottles.

## 2019-08-27 NOTE — TELEPHONE ENCOUNTER
Left message for patient to call back regarding pre-visit planning. Please transfer call to [phone].

## 2019-08-28 NOTE — PROGRESS NOTES
Provider Encounter- Venous Stasis Ulcer    HISTORY OF PRESENT ILLNESS    START OF CARE IN CLINIC: 8/27/2019     REFERRING PROVIDER: Tim Serna M.D.     WOUND- Venous stasis ulcer-resolved   LOCATION: Anterior right lower leg   VARICOSE VEINS: Present   WOUND HISTORY: Patient states he has had a recurring wound to this leg off and on for 7 years.  The wound is always at the same location, and is often preceded by edema.  Most recently he had a wound to the site which began at the end of July or early August, which resolved approximately 7 to 10 days later.  The right leg is noticeably more swollen than his left leg.  He denies any previous trauma or surgeries to this leg in the past.  He states that he wears compression stockings, however only 2 to 4 days/week.  He does have varicosities to both legs, though states he does not have much pain or discomfort from them.   PREVIOUS TREATMENT: Compression stockings   PERTINENT PMH: Obesity, CKD stage III, history of gastric bypass surgery     IMAGING: N/A   VASCULAR STUDIES: None found in epic     LAST  WOUND CULTURE: None found in epic   COMPRESSION: Patient wears compression stockings off and on, is not sure of compression level       DIABETES: No  TOBACCO USE: Former smoker, quit more than 20 years ago        8/27/2019 : Clinic visit with JASON Saab.  Initial clinic visit.  Max presents today with his wife.  He does not have an open wound to his lower leg currently.  His most recent wound healed 7 to 10 days ago.  His wife is very concerned that the wound keeps recurring.  Max states he is never been seen by vascular surgeon regarding his varicose veins or lower leg edema.        PAST MEDICAL HISTORY:   Past Medical History:   Diagnosis Date   • Arthritis     Osteo-generalized   • Chronic venous insufficiency 9/4/2014   • Dental disorder 12/17/14    had two extracted last week 12/09/14=were infected    • GERD (gastroesophageal reflux disease)    •  Headache, classical migraine    • Heart burn    • Indigestion    • MEDICAL HOME    • Pain 12/17/14    chronic L knee=3/10   • Sleep apnea     BiPAP   • Snoring        PAST SURGICAL HISTORY:   Past Surgical History:   Procedure Laterality Date   • PB LAP,DIAGNOSTIC ABDOMEN  8/7/2019    Procedure: LAPAROSCOPY-DIAGNOSTIC;  Surgeon: Julian Carmen M.D.;  Location: SURGERY [facility];  Service: General   • LAPAROSCOPIC LYSIS OF ADHESIONS  8/7/2019    Procedure: LYSIS, ADHESIONS, LAPAROSCOPIC;  Surgeon: Julian Carmen M.D.;  Location: SURGERY [facility];  Service: General   • INCISION HERNIA REPAIR  8/7/2019    Procedure: REPAIR, HERNIA, INCISIONAL- POSSIBLE;  Surgeon: Julian Carmen M.D.;  Location: SURGERY [facility];  Service: General   • VENTRAL HERNIA REPAIR LAPAROSCOPIC  10/1/2018    Procedure: VENTRAL HERNIA REPAIR LAPAROSCOPIC- FOR INCISIONAL HERNIA WITH MESH;  Surgeon: Julian Carmen M.D.;  Location: SURGERY [facility];  Service: General   • GASTRIC SLEEVE LAPAROSCOPY  12/29/2014    Performed by Julian Carmen M.D. at SURGERY [facility]   • KNEE REPLACEMENT, TOTAL  2012    left   • OTHER ORTHOPEDIC SURGERY  1998    bicep tendon repair   • APPENDECTOMY     • CHOLECYSTECTOMY          MEDICATIONS:   Current Outpatient Medications   Medication   • amoxicillin (AMOXIL) 500 MG Cap   • acetaminophen (TYLENOL) 500 MG Tab   • allopurinol (ZYLOPRIM) 300 MG Tab   • omeprazole (PRILOSEC) 20 MG delayed-release capsule   • triamterene-hctz (MAXZIDE-25/DYAZIDE) 37.5-25 MG Tab   • ketoconazole (NIZORAL) 2 % shampoo   • vitamin D (CHOLECALCIFEROL) 1000 UNIT Tab     No current facility-administered medications for this visit.        ALLERGIES:  No Known Allergies      SOCIAL HISTORY:   Social History     Socioeconomic History   • Marital status:      Spouse name: Not on file   • Number of children: Not on file   • Years of education: Not on file   • Highest education level: Not on file   Occupational  History   • Not on file   Social Needs   • Financial resource strain: Not on file   • Food insecurity:     Worry: Not on file     Inability: Not on file   • Transportation needs:     Medical: Not on file     Non-medical: Not on file   Tobacco Use   • Smoking status: Former Smoker     Packs/day: 1.00     Years: 25.00     Pack years: 25.00     Types: Cigarettes     Last attempt to quit: 1994     Years since quittin.6   • Smokeless tobacco: Never Used   Substance and Sexual Activity   • Alcohol use: Yes     Alcohol/week: 0.5 oz     Types: 1 Cans of beer per week     Comment: one a month   • Drug use: No   • Sexual activity: Yes     Partners: Female   Lifestyle   • Physical activity:     Days per week: Not on file     Minutes per session: Not on file   • Stress: Not on file   Relationships   • Social connections:     Talks on phone: Not on file     Gets together: Not on file     Attends [employer] service: Not on file     Active member of club or organization: Not on file     Attends meetings of clubs or organizations: Not on file     Relationship status: Not on file   • Intimate partner violence:     Fear of current or ex partner: Not on file     Emotionally abused: Not on file     Physically abused: Not on file     Forced sexual activity: Not on file   Other Topics Concern   • Not on file   Social History Narrative   • Not on file       FAMILY HISTORY:   Family History   Problem Relation Age of Onset   • Diabetes Mother    • Arthritis Mother    • Lung Disease Father    • Arthritis Father    • Heart Disease Father    • Alcohol/Drug Brother         REVIEW OF SYSTEMS:   Review of Systems   Constitutional: Negative for chills and fever.   Respiratory: Negative for cough and shortness of breath.    Cardiovascular: Positive for leg swelling. Negative for chest pain and claudication.        Swelling of both legs off and on for several years  Right leg is been larger than the left leg for many years, he does not  recall exactly how long this is been going on   Gastrointestinal: Negative for constipation, diarrhea, nausea and vomiting.   Genitourinary: Negative for dysuria.   Musculoskeletal: Positive for back pain. Negative for joint pain.        Chronic back pain   Neurological: Negative for dizziness.   Psychiatric/Behavioral: Negative for depression. The patient is not nervous/anxious.            PHYSICAL EXAMINATION:   /92   Pulse 81   Temp 36.7 °C (98 °F)   Resp 18   SpO2 97%   Physical Exam   Constitutional: He is well-developed, well-nourished, and in no distress.   He is obese   HENT:   Head: Normocephalic.   Eyes: Pupils are equal, round, and reactive to light.   Cardiovascular: Intact distal pulses.   DP and PT pulses palpable, 2+ bilaterally   Pulmonary/Chest: Effort normal.   Musculoskeletal: Normal range of motion. He exhibits edema.   3+ edema of right lower extremity  2+ edema of left lower extremity   Skin: Skin is warm.   Hemosiderin staining, skin changes to bilateral lower extremities  Varicose veins to both lower legs                   WOUND ASSESSMENT -no open wound    Resolved wound to anterior right lower leg, (mislabeled and photo)        PATIENT EDUCATION  - Etiology of venous stasis ulceration discussed with patient  - Importance of managing edema for healing of ulcer, and for prevention of new ulcer development  -Need for lifelong compression of lower legs   -Elevate legs above the level of the heart periodically throughout the day.  - Importance of adequate nutrition for wound healing  -Increase protein intake (unless contraindicated by renal status)  -Advised to go to ER for any increased redness, swelling, drainage or odor, or if patient develops fever, chills, nausea or vomiting.    ASSESSMENT AND PLAN:       1. Chronic venous insufficiency  Comments: Hemosiderin staining, chronic skin changes, scarring, and edema, to both lower legs.  Right lower extremity significantly more  edematous than the right    8/27: Initial clinic visit.  Patient currently has no open wounds, and thus does not need to continue at [facility].  He would likely benefit from vascular surgery consultation per    -Discharge from [facility].  He understands that he is to return to the clinic ASAP if his wound reopens.  -Referral to vascular surgery    2. Obesity (BMI 30-39.9)  Comments: Complicating factor, increased venous hypertension    8/27: Benefits of weight loss to manage CVI, and for overall better health discussed      Please note that this dictation was created using voice recognition software. I have worked with technical experts from [employer] to optimize the interface.  I have made every reasonable attempt to correct obvious errors, but there may be errors of grammar and possibly content that I did not discover before finalizing the note.

## 2019-08-29 NOTE — TELEPHONE ENCOUNTER
Left message for patient to call back regarding pre-visit planning. Please transfer call to [phone].

## 2019-09-03 ENCOUNTER — OFFICE VISIT (OUTPATIENT)
Dept: MEDICAL GROUP | Facility: MEDICAL CENTER | Age: 64
End: 2019-09-03
Payer: MEDICARE

## 2019-09-03 VITALS
HEART RATE: 94 BPM | BODY MASS INDEX: 39.51 KG/M2 | HEIGHT: 71 IN | WEIGHT: 282.2 LBS | OXYGEN SATURATION: 98 % | TEMPERATURE: 97.9 F | DIASTOLIC BLOOD PRESSURE: 86 MMHG | SYSTOLIC BLOOD PRESSURE: 132 MMHG

## 2019-09-03 DIAGNOSIS — L97.919 VENOUS ULCER OF RIGHT LEG (HCC): ICD-10-CM

## 2019-09-03 DIAGNOSIS — I10 ESSENTIAL HYPERTENSION: ICD-10-CM

## 2019-09-03 DIAGNOSIS — Z23 NEED FOR VACCINATION: ICD-10-CM

## 2019-09-03 DIAGNOSIS — Z00.00 MEDICARE ANNUAL WELLNESS VISIT, SUBSEQUENT: ICD-10-CM

## 2019-09-03 DIAGNOSIS — I83.019 VENOUS ULCER OF RIGHT LEG (HCC): ICD-10-CM

## 2019-09-03 DIAGNOSIS — N18.30 CHRONIC KIDNEY DISEASE (CKD), STAGE III (MODERATE) (HCC): ICD-10-CM

## 2019-09-03 DIAGNOSIS — I87.2 CHRONIC VENOUS INSUFFICIENCY: ICD-10-CM

## 2019-09-03 DIAGNOSIS — E66.9 OBESITY (BMI 30-39.9): ICD-10-CM

## 2019-09-03 PROBLEM — K43.2 INCISIONAL HERNIA, WITHOUT OBSTRUCTION OR GANGRENE: Status: RESOLVED | Noted: 2017-07-18 | Resolved: 2019-09-03

## 2019-09-03 PROCEDURE — 90670 PCV13 VACCINE IM: CPT | Performed by: FAMILY MEDICINE

## 2019-09-03 PROCEDURE — G0439 PPPS, SUBSEQ VISIT: HCPCS | Mod: 25 | Performed by: FAMILY MEDICINE

## 2019-09-03 PROCEDURE — G0009 ADMIN PNEUMOCOCCAL VACCINE: HCPCS | Performed by: FAMILY MEDICINE

## 2019-09-03 SDOH — HEALTH STABILITY: MENTAL HEALTH: HOW MANY STANDARD DRINKS CONTAINING ALCOHOL DO YOU HAVE ON A TYPICAL DAY?: 1 OR 2

## 2019-09-03 SDOH — HEALTH STABILITY: MENTAL HEALTH: HOW OFTEN DO YOU HAVE A DRINK CONTAINING ALCOHOL?: MONTHLY OR LESS

## 2019-09-03 SDOH — HEALTH STABILITY: MENTAL HEALTH: HOW OFTEN DO YOU HAVE 6 OR MORE DRINKS ON ONE OCCASION?: NEVER

## 2019-09-03 ASSESSMENT — ENCOUNTER SYMPTOMS: GENERAL WELL-BEING: FAIR

## 2019-09-03 ASSESSMENT — PATIENT HEALTH QUESTIONNAIRE - PHQ9: CLINICAL INTERPRETATION OF PHQ2 SCORE: 0

## 2019-09-03 ASSESSMENT — ACTIVITIES OF DAILY LIVING (ADL): BATHING_REQUIRES_ASSISTANCE: 0

## 2019-09-03 NOTE — PROGRESS NOTES
Chief Complaint   Patient presents with   • Annual Wellness Visit         HPI:  Max is a 64 y.o. here for Medicare Annual Wellness Visit        Patient Active Problem List    Diagnosis Date Noted   • Chronic pain of left thumb 08/22/2018   • 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/2018   • History of gastric surgery 07/18/2017   • Chronic kidney disease (CKD), stage III (moderate) (HCC) 01/12/2017   • Chronic pain of right knee 08/05/2015   • Chronic lower back pain 06/23/2015   • Dermatitis, seborrheic 06/23/2015   • Obesity (BMI 30-39.9) 06/23/2015   • Hx of total knee replacement 04/30/2015   • Tinnitus of both ears 10/13/2014   • Chronic venous insufficiency 09/04/2014   • HTN (hypertension) 09/04/2014   • Migraines 09/04/2014   • GERD (gastroesophageal reflux disease) 09/04/2014   • Seasonal allergies 09/04/2014   • Sleep apnea with use of continuous positive airway pressure (CPAP) 09/04/2014       Current Outpatient Medications   Medication Sig Dispense Refill   • multivitamin (THERAGRAN) Tab Take 1 Tab by mouth every day.     • amoxicillin (AMOXIL) 500 MG Cap TAKE 4 CAPS BY MOUTH PRE-OP FOR 1 DOSE.  0   • acetaminophen (TYLENOL) 500 MG Tab Take 1,000 mg by mouth every 6 hours as needed (MIGRAINE).     • allopurinol (ZYLOPRIM) 300 MG Tab TAKE 1 TABLET BY MOUTH EVERY DAY 90 Tab 3   • omeprazole (PRILOSEC) 20 MG delayed-release capsule TAKE 1 CAPSULE BY MOUTH EVERY DAY 90 Cap 3   • triamterene-hctz (MAXZIDE-25/DYAZIDE) 37.5-25 MG Tab TAKE 1 TABLET BY MOUTH EVERY DAY 90 Tab 3   • ketoconazole (NIZORAL) 2 % shampoo APPLY 1 APPLICATION TWICE DAILY 120 mL 5   • vitamin D (CHOLECALCIFEROL) 1000 UNIT Tab Take 2,000 Units by mouth every day.       No current facility-administered medications for this visit.         Patient is taking medications as noted in medication list.  Current supplements as per medication list.     Allergies: Patient has no known allergies.    Current social  contact/activities: Pt travels with spouse. Pt enjoys going to shows and traveling.     Is patient current with immunizations? No, due for HEPATITIS B and PREVNAR (PCV13) . Patient is interested in receiving NONE today.    He  reports that he quit smoking about 25 years ago. His smoking use included cigarettes. He has a 25.00 pack-year smoking history. He has never used smokeless tobacco. He reports that he drinks about 0.5 oz of alcohol per week. He reports that he does not use drugs.  Counseling given: Not Answered        DPA/Advanced directive: Patient has Advanced Directive, but it is not on file. Instructed to bring in a copy to scan into their chart.    ROS:    Gait: Uses a scooter   Ostomy: No   Other tubes: No   Amputations: No   Chronic oxygen use No   Last eye exam Pt states a year ago in October    Wears hearing aids: No   : Denies any urinary leakage during the last 6 months          Depression Screening    Little interest or pleasure in doing things?  0 - not at all  Feeling down, depressed, or hopeless? 0 - not at all  Patient Health Questionnaire Score: 0    If depressive symptoms identified deferred to follow up visit unless specifically addressed in assessment and plan.    Interpretation of PHQ-9 Total Score   Score Severity   1-4 No Depression   5-9 Mild Depression   10-14 Moderate Depression   15-19 Moderately Severe Depression   20-27 Severe Depression    Screening for Cognitive Impairment    Three Minute Recall (village, kitchen, baby)  3/3    Vick clock face with all 12 numbers and set the hands to show 10 past 10.  Yes 5/5  If cognitive concerns identified, deferred for follow up unless specifically addressed in assessment and plan.    Fall Risk Assessment    Has the patient had two or more falls in the last year or any fall with injury in the last year?  No  If fall risk identified, deferred for follow up unless specifically addressed in assessment and plan.    Safety Assessment    Throw  rugs on floor.  Yes  Handrails on all stairs.  Yes  Good lighting in all hallways.  Yes  Difficulty hearing.  No  Patient counseled about all safety risks that were identified.    Functional Assessment ADLs    Are there any barriers preventing you from cooking for yourself or meeting nutritional needs?  No.    Are there any barriers preventing you from driving safely or obtaining transportation?  No.    Are there any barriers preventing you from using a telephone or calling for help?  No.    Are there any barriers preventing you from shopping?  No.    Are there any barriers preventing you from taking care of your own finances?  No.    Are there any barriers preventing you from managing your medications?  No.    Are there any barriers preventing you from showering, bathing or dressing yourself?  No.    Are you currently engaging in any exercise or physical activity?  No.     What is your perception of your health?  Fair.    Health Maintenance Summary                HEPATITIS C SCREENING Overdue 1955     IMM HEP B VACCINE Overdue 7/8/1973     IMM PNEUMOCOCCAL VACCINE: 65+ Years Overdue 7/8/2019     Annual Wellness Visit Overdue 8/23/2019      Done 8/22/2018 Visit Dx: Medicare annual wellness visit, subsequent     Patient has more history with this topic...    IMM INFLUENZA Overdue 9/1/2019      Done 11/17/2018 Imm Admin: Influenza Vaccine Quad Inj (Pf)     Patient has more history with this topic...    COLONOSCOPY Next Due 8/6/2023      Done 8/6/2018 REFERRAL TO GI FOR COLONOSCOPY    IMM DTaP/Tdap/Td Vaccine Next Due 6/16/2025      Done 6/16/2015 Imm Admin: Tdap Vaccine          Patient Care Team:  Tim Serna M.D. as PCP - General (Family Medicine)  Julian Carmen M.D. as Consulting Physician (Surgery)  Oscar Avila M.D. as Consulting Physician (Orthopaedics)  [facility] as Consulting Physician  Sulaiman Hook D.D.S. as Consulting Physician (Dentistry)  Jorge Luis Ventura M.D. as Consulting Physician  (Ophthalmology)  Bhavik Palumbo M.D. as Consulting Physician (Gastroenterology)    Social History     Tobacco Use   • Smoking status: Former Smoker     Packs/day: 1.00     Years: 25.00     Pack years: 25.00     Types: Cigarettes     Last attempt to quit: 1994     Years since quittin.6   • Smokeless tobacco: Never Used   Substance Use Topics   • Alcohol use: Yes     Alcohol/week: 0.5 oz     Types: 1 Cans of beer per week     Frequency: Monthly or less     Drinks per session: 1 or 2     Binge frequency: Never     Comment: one a month   • Drug use: No     Family History   Problem Relation Age of Onset   • Diabetes Mother    • Arthritis Mother    • Lung Disease Father    • Arthritis Father    • Heart Disease Father    • Alcohol/Drug Brother      He  has a past medical history of Arthritis, Chronic venous insufficiency (2014), Dental disorder (14), GERD (gastroesophageal reflux disease), Headache, classical migraine, Heart burn, Incisional hernia, without obstruction or gangrene (2017), Indigestion, MEDICAL HOME, Pain (14), Sleep apnea, and Snoring.   Past Surgical History:   Procedure Laterality Date   • PB LAP,DIAGNOSTIC ABDOMEN  2019    Procedure: LAPAROSCOPY-DIAGNOSTIC;  Surgeon: Julian Carmen M.D.;  Location: [facility];  Service: General   • LAPAROSCOPIC LYSIS OF ADHESIONS  2019    Procedure: LYSIS, ADHESIONS, LAPAROSCOPIC;  Surgeon: Julian Carmen M.D.;  Location: [facility];  Service: General   • INCISION HERNIA REPAIR  2019    Procedure: REPAIR, HERNIA, INCISIONAL- POSSIBLE;  Surgeon: Julian Carmen M.D.;  Location: [facility];  Service: General   • VENTRAL HERNIA REPAIR LAPAROSCOPIC  10/1/2018    Procedure: VENTRAL HERNIA REPAIR LAPAROSCOPIC- FOR INCISIONAL HERNIA WITH MESH;  Surgeon: Julian Carmen M.D.;  Location: [facility];  Service: General   • GASTRIC SLEEVE LAPAROSCOPY  2014    Performed by Julian Carmen M.D.  "at [facility]   • KNEE REPLACEMENT, TOTAL  2012    left   • OTHER ORTHOPEDIC SURGERY  1998    bicep tendon repair   • APPENDECTOMY     • CHOLECYSTECTOMY             Exam:     /86 (BP Location: Left arm, Patient Position: Sitting, BP Cuff Size: Adult long)   Pulse 94   Temp 36.6 °C (97.9 °F) (Temporal)   Ht 1.805 m (5' 11.06\")   Wt (!) 128 kg (282 lb 3.2 oz)   SpO2 98%  Body mass index is 39.29 kg/m².    Constitutional: Alert, no distress.  Skin: Warm, dry, good turgor, no rashes in visible areas.  Eye: Equal, round and reactive, conjunctiva clear, lids normal.  ENMT: Lips without lesions, good dentition, oropharynx clear.  Abdomen: Soft, non-tender, left upper side lipoma - nontender  Psych: Alert and oriented x3, normal affect and mood.    Assessment and Plan. The following treatment and monitoring plan is recommended:    1. Medicare annual wellness visit, subsequent  - Subsequent Annual Wellness Visit - Includes PPPS ()    2. Chronic kidney disease (CKD), stage III (moderate) (HCC)  GFR stable around mid 50s on recent labs.  - Subsequent Annual Wellness Visit - Includes PPPS ()    3. Obesity (BMI 30-39.9)  - Patient identified as having weight management issue.  Appropriate orders and counseling given.  - Subsequent Annual Wellness Visit - Includes PPPS ()    4. Essential hypertension  Controlled. Continue lisinopril and Maxzide.  - Subsequent Annual Wellness Visit - Includes PPPS ()    5. Chronic venous insufficiency  Uncontrolled. Continue compression stocking in right leg. Referral to vascular.  - Subsequent Annual Wellness Visit - Includes PPPS ()    6. Venous ulcer of right leg (HCC)  Uncontrolled. Continue compression stocking in right leg. Referral to vascular.  - Subsequent Annual Wellness Visit - Includes PPPS ()  - REFERRAL TO VASCULAR SURGERY    7. Need for vaccination  - Subsequent Annual Wellness Visit - Includes PPPS ()  - Pneumococcal " Conjugate Vaccine 13-Valent        Services suggested: No services needed at this time  Health Care Screening recommendations as per orders if indicated.  Referrals offered: PT/OT/Nutrition counseling/Behavioral Health/Smoking cessation as per orders if indicated.    Discussion today about general wellness and lifestyle habits:    · Prevent falls and reduce trip hazards; Cautioned about securing or removing rugs.  · Have a working fire alarm and carbon monoxide detector;   · Engage in regular physical activity and social activities       Follow-up: Return in about 1 year (around 9/3/2020) for Annual.

## 2019-11-15 ENCOUNTER — OFFICE VISIT (OUTPATIENT)
Dept: URGENT CARE | Facility: PHYSICIAN GROUP | Age: 64
End: 2019-11-15
Payer: MEDICARE

## 2019-11-15 VITALS
BODY MASS INDEX: 39.55 KG/M2 | RESPIRATION RATE: 20 BRPM | HEIGHT: 72 IN | TEMPERATURE: 98.4 F | HEART RATE: 90 BPM | WEIGHT: 292 LBS | OXYGEN SATURATION: 96 % | SYSTOLIC BLOOD PRESSURE: 122 MMHG | DIASTOLIC BLOOD PRESSURE: 84 MMHG

## 2019-11-15 DIAGNOSIS — M10.072 ACUTE IDIOPATHIC GOUT OF LEFT ANKLE: ICD-10-CM

## 2019-11-15 PROCEDURE — 99214 OFFICE O/P EST MOD 30 MIN: CPT | Performed by: FAMILY MEDICINE

## 2019-11-15 RX ORDER — PREDNISONE 10 MG/1
TABLET ORAL
Qty: 11 TAB | Refills: 0 | Status: SHIPPED | OUTPATIENT
Start: 2019-11-15 | End: 2020-09-04

## 2019-11-15 ASSESSMENT — ENCOUNTER SYMPTOMS
SHORTNESS OF BREATH: 0
VOMITING: 0
TINGLING: 0
FEVER: 0

## 2019-11-15 ASSESSMENT — PAIN SCALES - GENERAL: PAINLEVEL: 6=MODERATE PAIN

## 2019-11-15 NOTE — PROGRESS NOTES
Subjective:     Max Alberto is a 64 y.o. male who presents for Gout (Right heel, Pt has Hx of gout and feels he is having a flare up)    HPI  Pt presents for evaluation of a new problem   Pt woke up with right heel pain 2 days ago   Pain started suddenly without injury or fall   Pain is constant, worse when ambulating, and rapidly worsened after onset  Heel feels swollen and slightly red  Has hx of gout and feels similar   Takes allopurinol for gout and has had much less frequent attacks since being on this med   Took a Percocet which helped  Patient actually improving a little bit this morning    Review of Systems   Constitutional: Negative for fever.   Respiratory: Negative for shortness of breath.    Cardiovascular: Negative for chest pain.   Gastrointestinal: Negative for vomiting.   Skin: Positive for rash.   Neurological: Negative for tingling.       PMH:  has a past medical history of Arthritis, Chronic venous insufficiency (9/4/2014), Dental disorder (12/17/14), GERD (gastroesophageal reflux disease), Headache, classical migraine, Heart burn, Incisional hernia, without obstruction or gangrene (7/18/2017), Indigestion, MEDICAL HOME, Pain (12/17/14), Sleep apnea, and Snoring.  MEDS:   Current Outpatient Medications:   •  multivitamin (THERAGRAN) Tab, Take 1 Tab by mouth every day., Disp: , Rfl:   •  amoxicillin (AMOXIL) 500 MG Cap, TAKE 4 CAPS BY MOUTH PRE-OP FOR 1 DOSE., Disp: , Rfl: 0  •  acetaminophen (TYLENOL) 500 MG Tab, Take 1,000 mg by mouth every 6 hours as needed (MIGRAINE)., Disp: , Rfl:   •  allopurinol (ZYLOPRIM) 300 MG Tab, TAKE 1 TABLET BY MOUTH EVERY DAY, Disp: 90 Tab, Rfl: 3  •  omeprazole (PRILOSEC) 20 MG delayed-release capsule, TAKE 1 CAPSULE BY MOUTH EVERY DAY, Disp: 90 Cap, Rfl: 3  •  triamterene-hctz (MAXZIDE-25/DYAZIDE) 37.5-25 MG Tab, TAKE 1 TABLET BY MOUTH EVERY DAY, Disp: 90 Tab, Rfl: 3  •  ketoconazole (NIZORAL) 2 % shampoo, APPLY 1 APPLICATION TWICE DAILY, Disp: 120 mL,  Rfl: 5  •  vitamin D (CHOLECALCIFEROL) 1000 UNIT Tab, Take 2,000 Units by mouth every day., Disp: , Rfl:   ALLERGIES: No Known Allergies  SURGHX:   Past Surgical History:   Procedure Laterality Date   • PB LAP,DIAGNOSTIC ABDOMEN  8/7/2019    Procedure: LAPAROSCOPY-DIAGNOSTIC;  Surgeon: Julian Carmen M.D.;  Location: [facility];  Service: General   • LAPAROSCOPIC LYSIS OF ADHESIONS  8/7/2019    Procedure: LYSIS, ADHESIONS, LAPAROSCOPIC;  Surgeon: Julian Carmen M.D.;  Location: [facility];  Service: General   • INCISION HERNIA REPAIR  8/7/2019    Procedure: REPAIR, HERNIA, INCISIONAL- POSSIBLE;  Surgeon: Julian Carmen M.D.;  Location: [facility];  Service: General   • VENTRAL HERNIA REPAIR LAPAROSCOPIC  10/1/2018    Procedure: VENTRAL HERNIA REPAIR LAPAROSCOPIC- FOR INCISIONAL HERNIA WITH MESH;  Surgeon: Julian Carmen M.D.;  Location: [facility];  Service: General   • GASTRIC SLEEVE LAPAROSCOPY  12/29/2014    Performed by Julian Carmen M.D. at SUR[facility]ERY [facility]   • KNEE REPLACEMENT, TOTAL  2012    left   • OTHER ORTHOPEDIC SURGERY  1998    bicep tendon repair   • APPENDECTOMY     • CHOLECYSTECTOMY       SOCHX:  reports that he quit smoking about 25 years ago. His smoking use included cigarettes. He has a 25.00 pack-year smoking history. He has never used smokeless tobacco. He reports previous alcohol use of about 0.5 oz of alcohol per week. He reports that he does not use drugs.  FH: Family history was reviewed, not contributing to acute illness     Objective:   /84 (BP Location: Right arm, Patient Position: Sitting, BP Cuff Size: Adult)   Pulse 90   Temp 36.9 °C (98.4 °F) (Temporal)   Resp 20   Ht 1.829 m (6')   Wt (!) 132.5 kg (292 lb)   SpO2 96%   BMI 39.60 kg/m²     Physical Exam  Constitutional:       General: He is not in acute distress.     Appearance: He is well-developed. He is not diaphoretic.   HENT:      Head: Normocephalic and  atraumatic.   Musculoskeletal:      Comments: Left ankle/foot:  Appearance - No bruising, erythema, or deformity appreciated  Palpation - +TTP along the ankle joint and around calcaneus   ROM - FROM throughout  Strength - 5/5 throughout  Neurovascular - 2+ dorsalis pedis and posterior tibial.  Sensation intact and equal bilaterally   Skin:     General: Skin is warm and dry.   Neurological:      Mental Status: He is alert and oriented to person, place, and time.   Psychiatric:         Behavior: Behavior normal.         Thought Content: Thought content normal.         Judgment: Judgment normal.       Assessment/Plan:   Assessment    1. Acute idiopathic gout of left ankle  - predniSONE (DELTASONE) 10 MG Tab; Take 2 tabs (20mg) daily x 3 days, then take 1 tab (10mg) daily x 3 days, then take 1/2 tab (5mg) daily x 4 days  Dispense: 11 Tab; Refill: 0

## 2020-01-27 ENCOUNTER — PATIENT OUTREACH (OUTPATIENT)
Dept: HEALTH INFORMATION MANAGEMENT | Facility: OTHER | Age: 65
End: 2020-01-27

## 2020-01-27 NOTE — PROGRESS NOTES
Attempted to call member and introduce myself as his SCP . The member stated he was busy and asked that I call back another day. I advised that I would. If the member calls back, please transfer to [phone].    Attempt # 1

## 2020-04-15 SDOH — ECONOMIC STABILITY: TRANSPORTATION INSECURITY
IN THE PAST 12 MONTHS, HAS THE LACK OF TRANSPORTATION KEPT YOU FROM MEDICAL APPOINTMENTS OR FROM GETTING MEDICATIONS?: NO

## 2020-04-15 SDOH — ECONOMIC STABILITY: TRANSPORTATION INSECURITY
IN THE PAST 12 MONTHS, HAS LACK OF TRANSPORTATION KEPT YOU FROM MEETINGS, WORK, OR FROM GETTING THINGS NEEDED FOR DAILY LIVING?: NO

## 2020-04-15 SDOH — ECONOMIC STABILITY: FOOD INSECURITY: WITHIN THE PAST 12 MONTHS, YOU WORRIED THAT YOUR FOOD WOULD RUN OUT BEFORE YOU GOT MONEY TO BUY MORE.: NEVER TRUE

## 2020-04-15 SDOH — ECONOMIC STABILITY: FOOD INSECURITY: WITHIN THE PAST 12 MONTHS, THE FOOD YOU BOUGHT JUST DIDN'T LAST AND YOU DIDN'T HAVE MONEY TO GET MORE.: NEVER TRUE

## 2020-04-15 NOTE — PROGRESS NOTES
1. [facility]Connect Verified: yes    2. Verify PCP: yes    3. Review and add  to Care Team: yes    5. Reviewed/Updated the following with patient:       •   Communication Preference Obtained? YES  • MyChart Activation: already active       •   E-Mail Address Obtained? YES       •   Appointment Day and Time Preferences? YES       •   Preferred Pharmacy? YES       •   Preferred Lab? YES    6. Care Gap Scheduling (Attempt to Schedule EACH Overdue Care Gap!)    Patient has completed Annual Wellness Visit (AWV)

## 2020-04-22 DIAGNOSIS — L21.9 DERMATITIS, SEBORRHEIC: ICD-10-CM

## 2020-04-22 RX ORDER — KETOCONAZOLE 20 MG/ML
SHAMPOO TOPICAL
Qty: 120 ML | Refills: 5 | Status: SHIPPED | OUTPATIENT
Start: 2020-04-22 | End: 2021-03-02 | Stop reason: SDUPTHER

## 2020-05-08 ENCOUNTER — PATIENT OUTREACH (OUTPATIENT)
Dept: HEALTH INFORMATION MANAGEMENT | Facility: OTHER | Age: 65
End: 2020-05-08

## 2020-05-08 ENCOUNTER — PATIENT MESSAGE (OUTPATIENT)
Dept: HEALTH INFORMATION MANAGEMENT | Facility: OTHER | Age: 65
End: 2020-05-08

## 2020-05-08 NOTE — PROGRESS NOTES
Called member to schedule upcoming AWV with PCP. The member stated that it was not a good time to talk and asked that I try back in a week or so. I advised that I would. The member had no questions or concerns at this time. If the member calls back, please transfer to [phone].    Attempt # 1

## 2020-05-26 RX ORDER — OMEPRAZOLE 20 MG/1
CAPSULE, DELAYED RELEASE ORAL
Qty: 90 CAP | Refills: 3 | Status: SHIPPED | OUTPATIENT
Start: 2020-05-26 | End: 2020-09-04 | Stop reason: SDUPTHER

## 2020-05-26 RX ORDER — TRIAMTERENE AND HYDROCHLOROTHIAZIDE 37.5; 25 MG/1; MG/1
TABLET ORAL
Qty: 90 TAB | Refills: 3 | Status: SHIPPED | OUTPATIENT
Start: 2020-05-26 | End: 2020-09-04 | Stop reason: SDUPTHER

## 2020-06-03 ENCOUNTER — TELEPHONE (OUTPATIENT)
Dept: HEALTH INFORMATION MANAGEMENT | Facility: OTHER | Age: 65
End: 2020-06-03

## 2020-06-03 DIAGNOSIS — I10 ESSENTIAL HYPERTENSION: ICD-10-CM

## 2020-06-03 DIAGNOSIS — N18.30 CHRONIC KIDNEY DISEASE (CKD), STAGE III (MODERATE): ICD-10-CM

## 2020-06-03 DIAGNOSIS — Z12.5 PROSTATE CANCER SCREENING: ICD-10-CM

## 2020-06-03 DIAGNOSIS — Z11.59 NEED FOR HEPATITIS C SCREENING TEST: ICD-10-CM

## 2020-06-03 DIAGNOSIS — M1A.09X1 IDIOPATHIC CHRONIC GOUT OF MULTIPLE SITES WITH TOPHUS: ICD-10-CM

## 2020-06-03 NOTE — PROGRESS NOTES
Called PT to schedule his upcoming AWV. I was able to schedule the appointment for the PT and provide all needed information. The PT asked that I request lab work as well. He had no questions or concerns at this time. If the PT calls in, please transfer to [phone].    Attempt # 2

## 2020-06-03 NOTE — TELEPHONE ENCOUNTER
Cristina Serna,     This patient is requesting routine lab work be ordered for his upcomming appointment with you, please advise. Thank you.     Kalyani Conway

## 2020-06-06 RX ORDER — ALLOPURINOL 300 MG/1
TABLET ORAL
Qty: 90 TAB | Refills: 3 | Status: SHIPPED | OUTPATIENT
Start: 2020-06-06 | End: 2020-09-04 | Stop reason: SDUPTHER

## 2020-06-09 ENCOUNTER — PATIENT OUTREACH (OUTPATIENT)
Dept: HEALTH INFORMATION MANAGEMENT | Facility: OTHER | Age: 65
End: 2020-06-09

## 2020-06-09 NOTE — PROGRESS NOTES
Outcome: Left Message to call back so I can schedule lab work for AWV     Please transfer to Patient Outreach Team at [phone] when patient returns call.    HealthConnect Verified: yes    Attempt # 1

## 2020-06-25 ENCOUNTER — PATIENT MESSAGE (OUTPATIENT)
Dept: MEDICAL GROUP | Facility: MEDICAL CENTER | Age: 65
End: 2020-06-25

## 2020-06-26 RX ORDER — AMOXICILLIN 500 MG/1
CAPSULE ORAL
Qty: 30 CAP | Refills: 0 | Status: SHIPPED | OUTPATIENT
Start: 2020-06-26 | End: 2020-09-04

## 2020-07-08 ENCOUNTER — PATIENT OUTREACH (OUTPATIENT)
Dept: HEALTH INFORMATION MANAGEMENT | Facility: OTHER | Age: 65
End: 2020-07-08

## 2020-07-08 ENCOUNTER — PATIENT MESSAGE (OUTPATIENT)
Dept: HEALTH INFORMATION MANAGEMENT | Facility: OTHER | Age: 65
End: 2020-07-08

## 2020-07-08 NOTE — PROGRESS NOTES
Called and wish the member a happy birthday. There was nothing the member needed at this time. If the member calls back, please transfer to [phone].

## 2020-08-24 ENCOUNTER — HOSPITAL ENCOUNTER (OUTPATIENT)
Dept: LAB | Facility: MEDICAL CENTER | Age: 65
End: 2020-08-24
Attending: FAMILY MEDICINE
Payer: MEDICARE

## 2020-08-24 DIAGNOSIS — I10 ESSENTIAL HYPERTENSION: ICD-10-CM

## 2020-08-24 DIAGNOSIS — Z11.59 NEED FOR HEPATITIS C SCREENING TEST: ICD-10-CM

## 2020-08-24 DIAGNOSIS — Z12.5 PROSTATE CANCER SCREENING: ICD-10-CM

## 2020-08-24 DIAGNOSIS — M1A.09X1 IDIOPATHIC CHRONIC GOUT OF MULTIPLE SITES WITH TOPHUS: ICD-10-CM

## 2020-08-24 DIAGNOSIS — N18.30 CHRONIC KIDNEY DISEASE (CKD), STAGE III (MODERATE): ICD-10-CM

## 2020-08-24 LAB
ALBUMIN SERPL BCP-MCNC: 4.2 G/DL (ref 3.2–4.9)
ALBUMIN/GLOB SERPL: 1.4 G/DL
ALP SERPL-CCNC: 53 U/L (ref 30–99)
ALT SERPL-CCNC: 28 U/L (ref 2–50)
ANION GAP SERPL CALC-SCNC: 12 MMOL/L (ref 7–16)
AST SERPL-CCNC: 20 U/L (ref 12–45)
BASOPHILS # BLD AUTO: 0.5 % (ref 0–1.8)
BASOPHILS # BLD: 0.04 K/UL (ref 0–0.12)
BILIRUB SERPL-MCNC: 1 MG/DL (ref 0.1–1.5)
BUN SERPL-MCNC: 26 MG/DL (ref 8–22)
CALCIUM SERPL-MCNC: 9.4 MG/DL (ref 8.5–10.5)
CHLORIDE SERPL-SCNC: 101 MMOL/L (ref 96–112)
CHOLEST SERPL-MCNC: 153 MG/DL (ref 100–199)
CO2 SERPL-SCNC: 27 MMOL/L (ref 20–33)
CREAT SERPL-MCNC: 1.29 MG/DL (ref 0.5–1.4)
EOSINOPHIL # BLD AUTO: 0.41 K/UL (ref 0–0.51)
EOSINOPHIL NFR BLD: 5.4 % (ref 0–6.9)
ERYTHROCYTE [DISTWIDTH] IN BLOOD BY AUTOMATED COUNT: 46.3 FL (ref 35.9–50)
GLOBULIN SER CALC-MCNC: 2.9 G/DL (ref 1.9–3.5)
GLUCOSE SERPL-MCNC: 100 MG/DL (ref 65–99)
HCT VFR BLD AUTO: 46.4 % (ref 42–52)
HCV AB SER QL: NORMAL
HDLC SERPL-MCNC: 50 MG/DL
HGB BLD-MCNC: 15.1 G/DL (ref 14–18)
IMM GRANULOCYTES # BLD AUTO: 0.01 K/UL (ref 0–0.11)
IMM GRANULOCYTES NFR BLD AUTO: 0.1 % (ref 0–0.9)
LDLC SERPL CALC-MCNC: 85 MG/DL
LYMPHOCYTES # BLD AUTO: 2.1 K/UL (ref 1–4.8)
LYMPHOCYTES NFR BLD: 27.5 % (ref 22–41)
MCH RBC QN AUTO: 30.4 PG (ref 27–33)
MCHC RBC AUTO-ENTMCNC: 32.5 G/DL (ref 33.7–35.3)
MCV RBC AUTO: 93.5 FL (ref 81.4–97.8)
MONOCYTES # BLD AUTO: 0.72 K/UL (ref 0–0.85)
MONOCYTES NFR BLD AUTO: 9.4 % (ref 0–13.4)
NEUTROPHILS # BLD AUTO: 4.37 K/UL (ref 1.82–7.42)
NEUTROPHILS NFR BLD: 57.1 % (ref 44–72)
NRBC # BLD AUTO: 0 K/UL
NRBC BLD-RTO: 0 /100 WBC
PLATELET # BLD AUTO: 170 K/UL (ref 164–446)
PMV BLD AUTO: 10.9 FL (ref 9–12.9)
POTASSIUM SERPL-SCNC: 4.5 MMOL/L (ref 3.6–5.5)
PROT SERPL-MCNC: 7.1 G/DL (ref 6–8.2)
PSA SERPL-MCNC: 2.4 NG/ML (ref 0–4)
RBC # BLD AUTO: 4.96 M/UL (ref 4.7–6.1)
SODIUM SERPL-SCNC: 140 MMOL/L (ref 135–145)
TRIGL SERPL-MCNC: 92 MG/DL (ref 0–149)
TSH SERPL DL<=0.005 MIU/L-ACNC: 1.33 UIU/ML (ref 0.38–5.33)
URATE SERPL-MCNC: 5.9 MG/DL (ref 2.5–8.3)
WBC # BLD AUTO: 7.7 K/UL (ref 4.8–10.8)

## 2020-08-24 PROCEDURE — 36415 COLL VENOUS BLD VENIPUNCTURE: CPT

## 2020-08-24 PROCEDURE — 84550 ASSAY OF BLOOD/URIC ACID: CPT

## 2020-08-24 PROCEDURE — 85025 COMPLETE CBC W/AUTO DIFF WBC: CPT

## 2020-08-24 PROCEDURE — 80061 LIPID PANEL: CPT

## 2020-08-24 PROCEDURE — G0472 HEP C SCREEN HIGH RISK/OTHER: HCPCS

## 2020-08-24 PROCEDURE — 84153 ASSAY OF PSA TOTAL: CPT

## 2020-08-24 PROCEDURE — 84443 ASSAY THYROID STIM HORMONE: CPT

## 2020-08-24 PROCEDURE — 80053 COMPREHEN METABOLIC PANEL: CPT

## 2020-09-03 SDOH — ECONOMIC STABILITY: INCOME INSECURITY: IN THE LAST 12 MONTHS, WAS THERE A TIME WHEN YOU WERE NOT ABLE TO PAY THE MORTGAGE OR RENT ON TIME?: NO

## 2020-09-03 SDOH — ECONOMIC STABILITY: TRANSPORTATION INSECURITY
IN THE PAST 12 MONTHS, HAS LACK OF RELIABLE TRANSPORTATION KEPT YOU FROM MEDICAL APPOINTMENTS, MEETINGS, WORK OR FROM GETTING THINGS NEEDED FOR DAILY LIVING?: NO

## 2020-09-03 SDOH — ECONOMIC STABILITY: HOUSING INSECURITY
IN THE LAST 12 MONTHS, WAS THERE A TIME WHEN YOU DID NOT HAVE A STEADY PLACE TO SLEEP OR SLEPT IN A SHELTER (INCLUDING NOW)?: NO

## 2020-09-03 SDOH — ECONOMIC STABILITY: HOUSING INSECURITY: IN THE LAST 12 MONTHS, HOW MANY PLACES HAVE YOU LIVED?: 1

## 2020-09-03 SDOH — HEALTH STABILITY: PHYSICAL HEALTH: ON AVERAGE, HOW MANY MINUTES DO YOU ENGAGE IN EXERCISE AT THIS LEVEL?: DECLINE

## 2020-09-03 SDOH — HEALTH STABILITY: MENTAL HEALTH
STRESS IS WHEN SOMEONE FEELS TENSE, NERVOUS, ANXIOUS, OR CAN'T SLEEP AT NIGHT BECAUSE THEIR MIND IS TROUBLED. HOW STRESSED ARE YOU?: ONLY A LITTLE

## 2020-09-03 SDOH — HEALTH STABILITY: PHYSICAL HEALTH: ON AVERAGE, HOW MANY DAYS PER WEEK DO YOU ENGAGE IN MODERATE TO STRENUOUS EXERCISE (LIKE A BRISK WALK)?: DECLINE

## 2020-09-03 ASSESSMENT — SOCIAL DETERMINANTS OF HEALTH (SDOH)
HOW OFTEN DO YOU HAVE SIX OR MORE DRINKS ON ONE OCCASION: NEVER
HOW OFTEN DO YOU HAVE A DRINK CONTAINING ALCOHOL: NEVER
HOW OFTEN DO YOU GET TOGETHER WITH FRIENDS OR RELATIVES?: THREE TIMES A WEEK
HOW HARD IS IT FOR YOU TO PAY FOR THE VERY BASICS LIKE FOOD, HOUSING, MEDICAL CARE, AND HEATING?: NOT VERY HARD
WITHIN THE PAST 12 MONTHS, THE FOOD YOU BOUGHT JUST DIDN'T LAST AND YOU DIDN'T HAVE MONEY TO GET MORE: NEVER TRUE
WITHIN THE PAST 12 MONTHS, YOU WORRIED THAT YOUR FOOD WOULD RUN OUT BEFORE YOU GOT THE MONEY TO BUY MORE: NEVER TRUE
HOW OFTEN DO YOU ATTEND CHURCH OR RELIGIOUS SERVICES?: NEVER
DO YOU BELONG TO ANY CLUBS OR ORGANIZATIONS SUCH AS CHURCH GROUPS UNIONS, FRATERNAL OR ATHLETIC GROUPS, OR SCHOOL GROUPS?: YES
IN A TYPICAL WEEK, HOW MANY TIMES DO YOU TALK ON THE PHONE WITH FAMILY, FRIENDS, OR NEIGHBORS?: MORE THAN THREE TIMES A WEEK
HOW OFTEN DO YOU ATTENT MEETINGS OF THE CLUB OR ORGANIZATION YOU BELONG TO?: NEVER

## 2020-09-04 ENCOUNTER — PATIENT MESSAGE (OUTPATIENT)
Dept: MEDICAL GROUP | Facility: MEDICAL CENTER | Age: 65
End: 2020-09-04

## 2020-09-04 ENCOUNTER — OFFICE VISIT (OUTPATIENT)
Dept: MEDICAL GROUP | Facility: MEDICAL CENTER | Age: 65
End: 2020-09-04
Payer: MEDICARE

## 2020-09-04 VITALS
DIASTOLIC BLOOD PRESSURE: 80 MMHG | HEART RATE: 96 BPM | BODY MASS INDEX: 41.17 KG/M2 | WEIGHT: 304 LBS | SYSTOLIC BLOOD PRESSURE: 122 MMHG | HEIGHT: 72 IN | OXYGEN SATURATION: 94 % | TEMPERATURE: 97.8 F

## 2020-09-04 DIAGNOSIS — I10 ESSENTIAL HYPERTENSION: ICD-10-CM

## 2020-09-04 DIAGNOSIS — E66.01 MORBID OBESITY WITH BMI OF 40.0-44.9, ADULT (HCC): ICD-10-CM

## 2020-09-04 DIAGNOSIS — Z00.00 MEDICARE ANNUAL WELLNESS VISIT, SUBSEQUENT: ICD-10-CM

## 2020-09-04 DIAGNOSIS — M1A.09X1 IDIOPATHIC CHRONIC GOUT OF MULTIPLE SITES WITH TOPHUS: ICD-10-CM

## 2020-09-04 DIAGNOSIS — I87.2 CHRONIC VENOUS INSUFFICIENCY: ICD-10-CM

## 2020-09-04 DIAGNOSIS — L21.9 SEBORRHEIC DERMATITIS: ICD-10-CM

## 2020-09-04 DIAGNOSIS — K21.9 GASTROESOPHAGEAL REFLUX DISEASE, ESOPHAGITIS PRESENCE NOT SPECIFIED: ICD-10-CM

## 2020-09-04 DIAGNOSIS — N18.30 CHRONIC KIDNEY DISEASE (CKD), STAGE III (MODERATE): ICD-10-CM

## 2020-09-04 PROCEDURE — G0439 PPPS, SUBSEQ VISIT: HCPCS | Performed by: FAMILY MEDICINE

## 2020-09-04 PROCEDURE — 8041 PR SCP AHA: Performed by: FAMILY MEDICINE

## 2020-09-04 RX ORDER — ALLOPURINOL 300 MG/1
300 TABLET ORAL
Qty: 90 TAB | Refills: 3 | Status: SHIPPED | OUTPATIENT
Start: 2020-09-04 | End: 2021-08-09 | Stop reason: SDUPTHER

## 2020-09-04 RX ORDER — TERBINAFINE HYDROCHLORIDE 250 MG/1
250 TABLET ORAL DAILY
Qty: 30 TAB | Refills: 0 | Status: SHIPPED | OUTPATIENT
Start: 2020-09-04 | End: 2020-09-22

## 2020-09-04 RX ORDER — AMOXICILLIN 500 MG/1
CAPSULE ORAL
Qty: 30 CAP | Refills: 0 | COMMUNITY
Start: 2020-09-04 | End: 2020-10-01

## 2020-09-04 RX ORDER — OMEPRAZOLE 20 MG/1
20 CAPSULE, DELAYED RELEASE ORAL
Qty: 90 CAP | Refills: 3 | Status: SHIPPED | OUTPATIENT
Start: 2020-09-04 | End: 2021-11-26

## 2020-09-04 RX ORDER — TRIAMTERENE AND HYDROCHLOROTHIAZIDE 37.5; 25 MG/1; MG/1
1 TABLET ORAL
Qty: 90 TAB | Refills: 3 | Status: SHIPPED | OUTPATIENT
Start: 2020-09-04 | End: 2021-11-26

## 2020-09-04 ASSESSMENT — ENCOUNTER SYMPTOMS: GENERAL WELL-BEING: GOOD

## 2020-09-04 ASSESSMENT — FIBROSIS 4 INDEX: FIB4 SCORE: 1.47

## 2020-09-04 ASSESSMENT — ACTIVITIES OF DAILY LIVING (ADL): BATHING_REQUIRES_ASSISTANCE: 0

## 2020-09-04 ASSESSMENT — PATIENT HEALTH QUESTIONNAIRE - PHQ9: CLINICAL INTERPRETATION OF PHQ2 SCORE: 0

## 2020-09-04 NOTE — TELEPHONE ENCOUNTER
Called [facility]. Omnel (itraconazole) prescription not on formulary with insurance, will cost $350 out of pocket. Will probably need PAR, awaiting fax from insurance to clarify.

## 2020-09-04 NOTE — PROGRESS NOTES
Chief Complaint   Patient presents with   • Annual Wellness Visit         HPI:  Max Alberto is a 65 y.o. here for Medicare Annual Wellness Visit     Patient Active Problem List    Diagnosis Date Noted   • Morbid obesity with BMI of 40.0-44.9, adult ([facility]) 09/04/2020   • Chronic pain of left thumb 08/22/2018   • 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/2018   • History of gastric surgery 07/18/2017   • Chronic kidney disease (CKD), stage III (moderate) ([facility]) 01/12/2017   • Chronic pain of right knee 08/05/2015   • Chronic lower back pain 06/23/2015   • Dermatitis, seborrheic 06/23/2015   • Obesity (BMI 30-39.9) 06/23/2015   • Hx of total knee replacement 04/30/2015   • Tinnitus of both ears 10/13/2014   • Chronic venous insufficiency 09/04/2014   • HTN (hypertension) 09/04/2014   • Migraines 09/04/2014   • GERD (gastroesophageal reflux disease) 09/04/2014   • Seasonal allergies 09/04/2014   • Sleep apnea with use of continuous positive airway pressure (CPAP) 09/04/2014       Current Outpatient Medications   Medication Sig Dispense Refill   • amoxicillin (AMOXIL) 500 MG Cap TAKE 4 CAPS BY MOUTH before dentist appointment 30 Cap 0   • Itraconazole 200 MG Tab Take 200 mg by mouth every day for 7 days. 7 Tab 0   • allopurinol (ZYLOPRIM) 300 MG Tab Take 1 Tab by mouth every day. 90 Tab 3   • omeprazole (PRILOSEC) 20 MG delayed-release capsule Take 1 Cap by mouth every day. 90 Cap 3   • triamterene-hctz (MAXZIDE-25/DYAZIDE) 37.5-25 MG Tab Take 1 Tab by mouth every day. 90 Tab 3   • ketoconazole (NIZORAL) 2 % shampoo APPLY 1 APPLICATION TWICE DAILY 120 mL 5   • multivitamin (THERAGRAN) Tab Take 1 Tab by mouth every day.     • acetaminophen (TYLENOL) 500 MG Tab Take 1,000 mg by mouth every 6 hours as needed (MIGRAINE).     • vitamin D (CHOLECALCIFEROL) 1000 UNIT Tab Take 2,000 Units by mouth every day.       No current facility-administered medications for this visit.             Current  supplements as per medication list.       Allergies: Patient has no known allergies.    Current social contact/activities: patient states he visits with family often but stays home most of the time due to COVID      He  reports that he quit smoking about 26 years ago. His smoking use included cigarettes. He has a 25.00 pack-year smoking history. He has never used smokeless tobacco. He reports previous alcohol use of about 0.5 oz of alcohol per week. He reports that he does not use drugs.  Counseling given: Not Answered      DPA/Advanced Directive:  Patient does not have an Advanced Directive.  A packet and workshop information was given on Advanced Directives.    ROS:    Gait: Uses a cane occ.  Ostomy: No  Other tubes: No  Amputations: No  Chronic oxygen use: No  Last eye exam: patient reports about 1 year ago  Wears hearing aids: No   : Denies any urinary leakage during the last 6 months      Depression Screening    Little interest or pleasure in doing things?  0 - not at all  Feeling down, depressed , or hopeless? 0 - not at all  Patient Health Questionnaire Score: 0     If depressive symptoms identified deferred to follow up visit unless specifically addressed in assessment and plan.    Interpretation of PHQ-9 Total Score   Score Severity   1-4 No Depression   5-9 Mild Depression   10-14 Moderate Depression   15-19 Moderately Severe Depression   20-27 Severe Depression    Screening for Cognitive Impairment    Three Minute Recall (river, nation, finger) 3/3    Vick clock face with all 12 numbers and set the hands to show 10 past 11.  Yes 5/5  Cognitive concerns identified deferred for follow up unless specifically addressed in assessment and plan.    Fall Risk Assessment    Has the patient had two or more falls in the last year or any fall with injury in the last year?  No    Safety Assessment    Throw rugs on floor.  Yes  Handrails on all stairs.  Yes  Good lighting in all hallways.  Yes  Difficulty hearing.   Yes  Patient counseled about all safety risks that were identified.    Functional Assessment ADLs    Are there any barriers preventing you from cooking for yourself or meeting nutritional needs?  No.    Are there any barriers preventing you from driving safely or obtaining transportation?  No.    Are there any barriers preventing you from using a telephone or calling for help?  No.    Are there any barriers preventing you from shopping?  No.    Are there any barriers preventing you from taking care of your own finances?  No.    Are there any barriers preventing you from managing your medications?  No.    Are there any barriers preventing you from showering, bathing or dressing yourself?  No.    Are you currently engaging in any exercise or physical activity?  No.     What is your perception of your health?  Good.      Health Maintenance Summary                IMM INFLUENZA Overdue 9/1/2020      Done 12/12/2019 Imm Admin: Influenza, Unspecified - Historical Data     Patient has more history with this topic...    Annual Wellness Visit Overdue 9/3/2020      Done 9/3/2019 SUBSEQUENT ANNUAL WELLNESS VISIT-INCLUDES PPPS ()     Patient has more history with this topic...    IMM PNEUMOCOCCAL VACCINE: 65+ Years Overdue 9/3/2020      Done 9/3/2019 Imm Admin: Pneumococcal Conjugate Vaccine (Prevnar/PCV-13)    COLONOSCOPY Next Due 8/6/2023      Done 8/6/2018 REFERRAL TO GI FOR COLONOSCOPY    IMM DTaP/Tdap/Td Vaccine Next Due 6/16/2025      Done 6/16/2015 Imm Admin: Tdap Vaccine          Patient Care Team:  Tim Serna M.D. as PCP - General (Family Medicine)  Julian Carmen M.D. as Consulting Physician (Surgery)  Oscar Avila M.D. as Consulting Physician (Orthopaedics)  [facility] as Consulting Physician  Sulaiman Hook D.D.S. as Consulting Physician (Dentistry)  Bhavik Palumbo M.D. as Consulting Physician (Gastroenterology)  Man M[facility] as [facility]         Social History     Tobacco Use    • Smoking status: Former Smoker     Packs/day: 1.00     Years: 25.00     Pack years: 25.00     Types: Cigarettes     Quit date: 1994     Years since quittin.6   • Smokeless tobacco: Never Used   Substance Use Topics   • Alcohol use: Not Currently     Alcohol/week: 0.5 oz     Types: 1 Cans of beer per week     Frequency: Never     Drinks per session: 1 or 2     Binge frequency: Never     Comment: one a month   • Drug use: No     Family History   Problem Relation Age of Onset   • Diabetes Mother    • Arthritis Mother    • Lung Disease Father    • Arthritis Father    • Heart Disease Father    • Alcohol/Drug Brother      He  has a past medical history of Arthritis, Chronic venous insufficiency (2014), Dental disorder (14), GERD (gastroesophageal reflux disease), Headache, classical migraine, Heart burn, Incisional hernia, without obstruction or gangrene (2017), Indigestion, MEDICAL HOME, Pain (14), Sleep apnea, and Snoring.   Past Surgical History:   Procedure Laterality Date   • PB LAP,DIAGNOSTIC ABDOMEN  2019    Procedure: LAPAROSCOPY-DIAGNOSTIC;  Surgeon: Julian Carmen M.D.;  Location: [facility];  Service: General   • LAPAROSCOPIC LYSIS OF ADHESIONS  2019    Procedure: LYSIS, ADHESIONS, LAPAROSCOPIC;  Surgeon: Julian Carmen M.D.;  Location: [facility];  Service: General   • INCISION HERNIA REPAIR  2019    Procedure: REPAIR, HERNIA, INCISIONAL- POSSIBLE;  Surgeon: Julian Carmen M.D.;  Location: [facility];  Service: General   • VENTRAL HERNIA REPAIR LAPAROSCOPIC  10/1/2018    Procedure: VENTRAL HERNIA REPAIR LAPAROSCOPIC- FOR INCISIONAL HERNIA WITH MESH;  Surgeon: Julian Carmen M.D.;  Location: [facility];  Service: General   • GASTRIC SLEEVE LAPAROSCOPY  2014    Performed by Julian Carmen M.D. at [facility]   • KNEE REPLACEMENT, TOTAL      left   • OTHER ORTHOPEDIC SURGERY      bicep tendon  repair   • APPENDECTOMY     • CHOLECYSTECTOMY         Exam:   /80 (BP Location: Right arm, Patient Position: Sitting, BP Cuff Size: Adult)   Pulse 96   Temp 36.6 °C (97.8 °F) (Temporal)   Ht 1.829 m (6')   Wt (!) 137.9 kg (304 lb)   SpO2 94%  Body mass index is 41.23 kg/m².    Constitutional: Alert, no distress.  Skin: Warm, dry, good turgor, no rashes in visible areas.  Eye: Equal, round and reactive, conjunctiva clear, lids normal.  Respiratory: Unlabored respiratory effort, lungs clear to auscultation, no wheezes, no ronchi.  Cardiovascular: Normal S1, S2, no murmur, no edema.  Psych: Alert and oriented x3, normal affect and mood.      Assessment and Plan. The following treatment and monitoring plan is recommended:    1. Medicare annual wellness visit, subsequent  Patient will have Pneumovax 23 and influenza vaccines at the pharmacy because we are out of stock today.  - Subsequent Annual Wellness Visit - Includes PPPS ()    2. Chronic kidney disease (CKD), stage III (moderate) (formerly [facility])  Stable. Continue to monitor with annual labs. Avoid NSAIDs and hydrate well.  - Subsequent Annual Wellness Visit - Includes PPPS ()    3. Morbid obesity with BMI of 40.0-44.9, adult (HCC)  - Patient identified as having weight management issue.  Appropriate orders and counseling given.  - Subsequent Annual Wellness Visit - Includes PPPS ()    4. Chronic venous insufficiency  He is status post vein ablation, which has helped swelling and he is no longer having ulcerations. He is also wearing compression stockings.  - Subsequent Annual Wellness Visit - Includes PPPS ()    5. Dermatitis, seborrheic  Uncontrolled.  He has significant amount of flaking from scalp and beard, which get on his close and is bothersome.  We will do a trial of oral itraconazole for 7 days because ketoconazole has not been effective for him.  If itraconazole does not help then he will need referral back to dermatology.  -  Subsequent Annual Wellness Visit - Includes PPPS ()  - Itraconazole 200 MG Tab; Take 200 mg by mouth every day for 7 days.  Dispense: 7 Tab; Refill: 0    6. Essential hypertension  Controlled. Continue Maxzide.  - Subsequent Annual Wellness Visit - Includes PPPS ()  - triamterene-hctz (MAXZIDE-25/DYAZIDE) 37.5-25 MG Tab; Take 1 Tab by mouth every day.  Dispense: 90 Tab; Refill: 3    7. Idiopathic chronic gout of multiple sites with tophus  Controlled. Continue allopurinol.  - Subsequent Annual Wellness Visit - Includes PPPS ()  - allopurinol (ZYLOPRIM) 300 MG Tab; Take 1 Tab by mouth every day.  Dispense: 90 Tab; Refill: 3    8. Gastroesophageal reflux disease, esophagitis presence not specified  Controlled. Continue omeprazole.  - Subsequent Annual Wellness Visit - Includes PPPS ()  - omeprazole (PRILOSEC) 20 MG delayed-release capsule; Take 1 Cap by mouth every day.  Dispense: 90 Cap; Refill: 3          Services suggested: No services needed at this time  Health Care Screening: Age-appropriate preventive services recommended by USPTF and ACIP covered by Medicare were discussed today. Services ordered if indicated and agreed upon by the patient.  Referrals offered: Community-based lifestyle interventions to reduce health risks and promote self-management and wellness, fall prevention, nutrition, physical activity, tobacco-use cessation, weight loss, and mental health services as per orders if indicated.    Discussion today about general wellness and lifestyle habits:    · Prevent falls and reduce trip hazards; Cautioned about securing or removing rugs.  · Have a working fire alarm and carbon monoxide detector;   · Engage in regular physical activity and social activities     Follow-up: Return in about 1 year (around 9/4/2021) for Annual.

## 2020-09-04 NOTE — ASSESSMENT & PLAN NOTE
Problem started when he moved from the [facility] to [place].  Has tried OTC hair shampoos, then tried ketoconazole 2% every other day but flaking is getting worse.  He went to the dermatologist and was tried on an expensive but did not help after a few months.

## 2020-09-16 ENCOUNTER — PATIENT MESSAGE (OUTPATIENT)
Dept: MEDICAL GROUP | Facility: MEDICAL CENTER | Age: 65
End: 2020-09-16

## 2020-09-22 RX ORDER — KETOCONAZOLE 200 MG/1
200 TABLET ORAL DAILY
Qty: 30 TAB | Refills: 0 | Status: SHIPPED | OUTPATIENT
Start: 2020-09-22 | End: 2020-10-22

## 2020-10-01 ENCOUNTER — OFFICE VISIT (OUTPATIENT)
Dept: MEDICAL GROUP | Facility: MEDICAL CENTER | Age: 65
End: 2020-10-01
Payer: MEDICARE

## 2020-10-01 VITALS
DIASTOLIC BLOOD PRESSURE: 84 MMHG | OXYGEN SATURATION: 96 % | TEMPERATURE: 97.5 F | HEIGHT: 72 IN | BODY MASS INDEX: 41.17 KG/M2 | HEART RATE: 86 BPM | SYSTOLIC BLOOD PRESSURE: 142 MMHG | WEIGHT: 304 LBS

## 2020-10-01 DIAGNOSIS — N18.31 STAGE 3A CHRONIC KIDNEY DISEASE: ICD-10-CM

## 2020-10-01 DIAGNOSIS — Z23 NEED FOR VACCINATION: ICD-10-CM

## 2020-10-01 DIAGNOSIS — E66.01 MORBID OBESITY WITH BMI OF 40.0-44.9, ADULT (HCC): ICD-10-CM

## 2020-10-01 DIAGNOSIS — H91.91 DECREASED HEARING OF RIGHT EAR: ICD-10-CM

## 2020-10-01 PROCEDURE — G0008 ADMIN INFLUENZA VIRUS VAC: HCPCS | Performed by: FAMILY MEDICINE

## 2020-10-01 PROCEDURE — 99214 OFFICE O/P EST MOD 30 MIN: CPT | Performed by: FAMILY MEDICINE

## 2020-10-01 PROCEDURE — 90662 IIV NO PRSV INCREASED AG IM: CPT | Performed by: FAMILY MEDICINE

## 2020-10-01 PROCEDURE — 8041 PR SCP AHA: Performed by: FAMILY MEDICINE

## 2020-10-01 RX ORDER — AMOXICILLIN 875 MG/1
875 TABLET, COATED ORAL 2 TIMES DAILY
Qty: 20 TAB | Refills: 0 | Status: SHIPPED | OUTPATIENT
Start: 2020-10-01 | End: 2020-10-11

## 2020-10-01 RX ORDER — FLUTICASONE PROPIONATE 50 MCG
1 SPRAY, SUSPENSION (ML) NASAL 2 TIMES DAILY
Qty: 16 G | Refills: 2 | Status: SHIPPED | OUTPATIENT
Start: 2020-10-01 | End: 2020-10-23

## 2020-10-01 ASSESSMENT — FIBROSIS 4 INDEX: FIB4 SCORE: 1.47

## 2020-10-01 NOTE — ASSESSMENT & PLAN NOTE
He lost his hearing about 1 week ago in right ear. There is associated tinnitus. As of this morning when he woke up, his hearing is coming back slightly.  He has sinus drainage and congestion in the morning.

## 2020-10-01 NOTE — PROGRESS NOTES
Subjective:   Max Alberto is a 65 y.o. male here today for decreased hearing right ear    Decreased hearing of right ear  He lost his hearing about 1 week ago in right ear. There is associated tinnitus. As of this morning when he woke up, his hearing is coming back slightly.  He has sinus drainage and congestion in the morning.         Current medicines (including changes today)  Current Outpatient Medications   Medication Sig Dispense Refill   • amoxicillin (AMOXIL) 875 MG tablet Take 1 Tab by mouth 2 times a day for 10 days. 20 Tab 0   • fluticasone (FLONASE) 50 MCG/ACT nasal spray Spray 1 Spray in nose 2 times a day. 16 g 2   • ketoconazole (NIZORAL) 200 MG Tab Take 1 Tab by mouth every day for 30 days. 30 Tab 0   • allopurinol (ZYLOPRIM) 300 MG Tab Take 1 Tab by mouth every day. 90 Tab 3   • omeprazole (PRILOSEC) 20 MG delayed-release capsule Take 1 Cap by mouth every day. 90 Cap 3   • triamterene-hctz (MAXZIDE-25/DYAZIDE) 37.5-25 MG Tab Take 1 Tab by mouth every day. 90 Tab 3   • ketoconazole (NIZORAL) 2 % shampoo APPLY 1 APPLICATION TWICE DAILY 120 mL 5   • multivitamin (THERAGRAN) Tab Take 1 Tab by mouth every day.     • acetaminophen (TYLENOL) 500 MG Tab Take 1,000 mg by mouth every 6 hours as needed (MIGRAINE).     • vitamin D (CHOLECALCIFEROL) 1000 UNIT Tab Take 2,000 Units by mouth every day.       No current facility-administered medications for this visit.      He  has a past medical history of Arthritis, Chronic venous insufficiency (9/4/2014), Dental disorder (12/17/14), GERD (gastroesophageal reflux disease), Headache, classical migraine, Heart burn, Incisional hernia, without obstruction or gangrene (7/18/2017), Indigestion, MEDICAL HOME, Pain (12/17/14), Sleep apnea, and Snoring.    ROS   No fever, + right sided headache       Objective:     /84 (BP Location: Right arm, Patient Position: Sitting, BP Cuff Size: Adult)   Pulse 86   Temp 36.4 °C (97.5 °F) (Temporal)   Ht 1.829 m  (6')   Wt (!) 137.9 kg (304 lb)   SpO2 96%  Body mass index is 41.23 kg/m².   Physical Exam:  Constitutional: Alert, no distress.  Skin: Warm, dry, good turgor, no rashes in visible areas.  Eye: Equal, round and reactive, conjunctiva clear, lids normal.  ENMT: right TM serous fluid. Left TM pearly gray  Psych: Alert and oriented x3, normal affect and mood.        Assessment and Plan:   The following treatment plan was discussed    1. Decreased hearing of right ear  New problem. Returning slightly today. Possibly infection vs eustachian tube dysfunction. Prescription for amoxicillin and Flonase. If no improvement in 10 days consider brain MRI and ENT referral.    2. Need for vaccination  - Influenza Vaccine, High Dose (65+ Only)    3. Stage 3a chronic kidney disease  Stable. Continue to monitor on annual labs.    4. Morbid obesity with BMI of 40.0-44.9, adult (HCC)  Weight gain over the last 1 year. Advised diet and exercise.      Followup: Return if symptoms worsen or fail to improve.

## 2020-10-08 ENCOUNTER — NON-PROVIDER VISIT (OUTPATIENT)
Dept: MEDICAL GROUP | Facility: MEDICAL CENTER | Age: 65
End: 2020-10-08
Payer: MEDICARE

## 2020-10-08 DIAGNOSIS — Z23 NEED FOR VACCINATION: ICD-10-CM

## 2020-10-08 PROCEDURE — G0009 ADMIN PNEUMOCOCCAL VACCINE: HCPCS | Performed by: FAMILY MEDICINE

## 2020-10-08 PROCEDURE — 90732 PPSV23 VACC 2 YRS+ SUBQ/IM: CPT | Performed by: FAMILY MEDICINE

## 2020-10-08 NOTE — PROGRESS NOTES
"Max Alberto is a 65 y.o. male here for a non-provider visit for:   PNEUMOVAX (PPSV23)    Reason for immunization: Overdue/Provider Recommended  Immunization records indicate need for vaccine: Yes, confirmed with Epic  Minimum interval has been met for this vaccine: Yes  ABN completed: Not Indicated    Order and dose verified by: KAYLEIGH  VIS Dated  10/30/19 was given to patient: Yes  All IAC Questionnaire questions were answered \"No.\"    Patient tolerated injection and no adverse effects were observed or reported: Yes    Pt scheduled for next dose in series: Not Indicated  "

## 2020-10-23 RX ORDER — FLUTICASONE PROPIONATE 50 MCG
1 SPRAY, SUSPENSION (ML) NASAL 2 TIMES DAILY
Qty: 16 ML | Refills: 2 | Status: SHIPPED | OUTPATIENT
Start: 2020-10-23 | End: 2021-01-20 | Stop reason: SDUPTHER

## 2021-01-20 RX ORDER — FLUTICASONE PROPIONATE 50 MCG
1 SPRAY, SUSPENSION (ML) NASAL 2 TIMES DAILY
Qty: 16 ML | Refills: 0 | Status: SHIPPED | OUTPATIENT
Start: 2021-01-20 | End: 2024-01-11

## 2021-03-02 DIAGNOSIS — L21.9 DERMATITIS, SEBORRHEIC: ICD-10-CM

## 2021-03-02 RX ORDER — KETOCONAZOLE 20 MG/ML
SHAMPOO TOPICAL
Qty: 120 ML | Refills: 0 | Status: SHIPPED | OUTPATIENT
Start: 2021-03-02 | End: 2021-03-24

## 2021-03-03 DIAGNOSIS — Z23 NEED FOR VACCINATION: ICD-10-CM

## 2021-03-15 ENCOUNTER — OFFICE VISIT (OUTPATIENT)
Dept: MEDICAL GROUP | Facility: MEDICAL CENTER | Age: 66
End: 2021-03-15
Payer: MEDICARE

## 2021-03-15 VITALS
SYSTOLIC BLOOD PRESSURE: 124 MMHG | TEMPERATURE: 97.1 F | DIASTOLIC BLOOD PRESSURE: 86 MMHG | WEIGHT: 315 LBS | BODY MASS INDEX: 42.66 KG/M2 | HEIGHT: 72 IN | OXYGEN SATURATION: 95 % | HEART RATE: 101 BPM

## 2021-03-15 DIAGNOSIS — Z79.2 PROPHYLACTIC ANTIBIOTIC: ICD-10-CM

## 2021-03-15 DIAGNOSIS — R21 RASH AND NONSPECIFIC SKIN ERUPTION: ICD-10-CM

## 2021-03-15 DIAGNOSIS — Z96.652 HISTORY OF TOTAL LEFT KNEE REPLACEMENT: ICD-10-CM

## 2021-03-15 PROCEDURE — 99214 OFFICE O/P EST MOD 30 MIN: CPT | Performed by: NURSE PRACTITIONER

## 2021-03-15 RX ORDER — AMOXICILLIN 500 MG/1
CAPSULE ORAL
Qty: 30 CAPSULE | Refills: 0 | Status: SHIPPED | OUTPATIENT
Start: 2021-03-15

## 2021-03-15 RX ORDER — CLOBETASOL PROPIONATE 0.05 G/100ML
1 SHAMPOO TOPICAL
Qty: 118 ML | Refills: 1 | Status: SHIPPED | OUTPATIENT
Start: 2021-03-15 | End: 2021-08-30

## 2021-03-15 ASSESSMENT — FIBROSIS 4 INDEX: FIB4 SCORE: 1.47

## 2021-03-16 NOTE — PROGRESS NOTES
Subjective:     Max Alberto is a 65 y.o. male who presents with antibiotic prophylaxis.      HPI:   Seen in f/u for antibiotic prophylaxis.  He has hx of left TKR.  He is about to have dental work with cavities and bridge work.  He needs antibx prophylaxix.   He uses amoxicillin.  No current fever, chills or sweating.    He has had his first COVID shot.  He will get his 2nd shot on 3/27/21.  His dental appt is before that.    He is due his HRA and lab in august/sept.    He has a rash that is behind his ear.  It is growing and spreading to chin, neck and top of head.  He saw derm and she prescribed tacrolimus.  It helped his beard but he hasn't seen derm for several years.  The tacrolimus stopped the itching.  Sx are spreading.  He is frustrated with derm and the meds since they are not helping his rash.      Patient Active Problem List    Diagnosis Date Noted   • Decreased hearing of right ear 10/01/2020   • Morbid obesity with BMI of 40.0-44.9, adult (HCC) 09/04/2020   • Chronic pain of left thumb 08/22/2018   • 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/2018   • History of gastric surgery 07/18/2017   • Chronic kidney disease (CKD), stage III (moderate) 01/12/2017   • Chronic pain of right knee 08/05/2015   • Chronic lower back pain 06/23/2015   • Dermatitis, seborrheic 06/23/2015   • Obesity (BMI 30-39.9) 06/23/2015   • Hx of total knee replacement 04/30/2015   • Tinnitus of both ears 10/13/2014   • Chronic venous insufficiency 09/04/2014   • HTN (hypertension) 09/04/2014   • Migraines 09/04/2014   • GERD (gastroesophageal reflux disease) 09/04/2014   • Seasonal allergies 09/04/2014   • Sleep apnea with use of continuous positive airway pressure (CPAP) 09/04/2014       Current medicines (including changes today)  Current Outpatient Medications   Medication Sig Dispense Refill   • amoxicillin (AMOXIL) 500 MG Cap TAKE 4 CAPS BY MOUTH before dentist appointment 30 capsule 0   •  Clobetasol Propionate 0.05 % Shampoo Apply 1 Application topically every 24 hours as needed. 118 mL 1   • ketoconazole (NIZORAL) 2 % shampoo APPLY 1 APPLICATION TWICE DAILY 120 mL 0   • fluticasone (FLONASE) 50 MCG/ACT nasal spray Administer 1 Spray into affected nostril(S) 2 times a day. 16 mL 0   • allopurinol (ZYLOPRIM) 300 MG Tab Take 1 Tab by mouth every day. 90 Tab 3   • omeprazole (PRILOSEC) 20 MG delayed-release capsule Take 1 Cap by mouth every day. 90 Cap 3   • triamterene-hctz (MAXZIDE-25/DYAZIDE) 37.5-25 MG Tab Take 1 Tab by mouth every day. 90 Tab 3   • multivitamin (THERAGRAN) Tab Take 1 Tab by mouth every day.     • acetaminophen (TYLENOL) 500 MG Tab Take 1,000 mg by mouth every 6 hours as needed (MIGRAINE).     • vitamin D (CHOLECALCIFEROL) 1000 UNIT Tab Take 2,000 Units by mouth every day.       No current facility-administered medications for this visit.       No Known Allergies    ROS  Constitutional: Negative. Negative for fever, chills, weight loss, malaise/fatigue and diaphoresis.   HENT: Negative. Negative for hearing loss, ear pain, nosebleeds, congestion, sore throat, neck pain, tinnitus and ear discharge.   Respiratory: Negative. Negative for cough, hemoptysis, sputum production, shortness of breath, wheezing and stridor.   Cardiovascular: Negative. Negative for chest pain, palpitations, orthopnea, claudication, leg swelling and PND.   Gastrointestinal: Denies nausea, vomiting, diarrhea, constipation, heartburn, melena or hematochezia.  Genitourinary: Denies dysuria, hematuria, urinary incontinence, frequency or urgency.        Objective:     /86   Pulse (!) 101   Temp 36.2 °C (97.1 °F) (Temporal)   Ht 1.829 m (6')   Wt (!) 145 kg (319 lb)   SpO2 95%  Body mass index is 43.26 kg/m².    Physical Exam:  Vitals reviewed.  Constitutional: Oriented to person, place, and time. appears well-developed and well-nourished. No distress.   Cardiovascular: Normal rate, regular rhythm, normal  heart sounds and intact distal pulses. Exam reveals no gallop and no friction rub. No murmur heard. No carotid bruits.   Pulmonary/Chest: Effort normal and breath sounds normal. No stridor. No respiratory distress. no wheezes or rales. exhibits no tenderness.   Musculoskeletal: Normal range of motion. exhibits no edema. opal pedal pulses 2+.  Lymphadenopathy: No cervical or supraclavicular adenopathy.   Neurological: Alert and oriented to person, place, and time. exhibits normal muscle tone.  Skin: Skin is warm and dry. No diaphoresis.  Scaling erythematous rash  Noted to scalp, chin, behind ears and forehead.    Psychiatric: Normal mood and affect. Behavior is normal.      Assessment and Plan:     The following treatment plan was discussed:    1. History of total left knee replacement  amoxicillin (AMOXIL) 500 MG Cap    refilled amoxicillin to take 2000 mg prior to dental procedure   2. Prophylactic antibiotic  amoxicillin (AMOXIL) 500 MG Cap    refilled amoxicillin   3. Rash and nonspecific skin eruption  Clobetasol Propionate 0.05 % Shampoo    try clobetasol for rash.  use xyzal for itching.  consider referral to new derm if not improving..  f/u 8/21 - call for lab slip         Followup: Return in about 6 months (around 9/15/2021).

## 2021-03-24 RX ORDER — KETOCONAZOLE 20 MG/ML
5 SHAMPOO TOPICAL
Qty: 100 ML | Refills: 1 | Status: SHIPPED | OUTPATIENT
Start: 2021-03-24 | End: 2022-10-02 | Stop reason: SDUPTHER

## 2021-06-10 ENCOUNTER — TELEPHONE (OUTPATIENT)
Dept: MEDICAL GROUP | Facility: MEDICAL CENTER | Age: 66
End: 2021-06-10

## 2021-06-10 DIAGNOSIS — Z98.890 HISTORY OF GASTRIC SURGERY: ICD-10-CM

## 2021-06-10 DIAGNOSIS — N18.31 STAGE 3A CHRONIC KIDNEY DISEASE: ICD-10-CM

## 2021-06-10 DIAGNOSIS — I87.2 CHRONIC VENOUS INSUFFICIENCY: ICD-10-CM

## 2021-06-10 DIAGNOSIS — M1A.09X1 IDIOPATHIC CHRONIC GOUT OF MULTIPLE SITES WITH TOPHUS: ICD-10-CM

## 2021-06-10 DIAGNOSIS — I10 ESSENTIAL HYPERTENSION: ICD-10-CM

## 2021-06-10 DIAGNOSIS — Z12.5 SCREENING FOR MALIGNANT NEOPLASM OF PROSTATE: ICD-10-CM

## 2021-06-10 NOTE — LETTER
June 16, 2021        Max Alberto  [address] [place] [address]        Dear Max:    We have received a request from your pharmacy to refill your prescription(s). After careful review of your chart, we have noted you are due for labs and a follow up appointment.  We request you call our office at [phone] at your earliest convenience and make an appointment. I have included a fasting lab order for you.    However, when patients are not followed closely by their physician, potential medication complications may go unadressed. We look forward to scheduling an appointment for you, so that we may provide you with the safest and most complete medical care.      If you have any questions or concerns, please don't hesitate to call.        Sincerely,        BANDAR Arana.    Electronically Signed

## 2021-06-16 ENCOUNTER — PATIENT OUTREACH (OUTPATIENT)
Dept: HEALTH INFORMATION MANAGEMENT | Facility: OTHER | Age: 66
End: 2021-06-16

## 2021-06-16 NOTE — PROGRESS NOTES
Member had questions regarding a letter received through his my chart portal. Educated with PCP standard procedures on letters. Verified lab orders and appointment details. Confirmed spouse lab details and appointment and documented in her PQ and Epic.

## 2021-07-21 ENCOUNTER — PATIENT MESSAGE (OUTPATIENT)
Dept: HEALTH INFORMATION MANAGEMENT | Facility: OTHER | Age: 66
End: 2021-07-21

## 2021-07-23 ENCOUNTER — PATIENT MESSAGE (OUTPATIENT)
Dept: HEALTH INFORMATION MANAGEMENT | Facility: OTHER | Age: 66
End: 2021-07-23

## 2021-07-23 ENCOUNTER — PATIENT OUTREACH (OUTPATIENT)
Dept: HEALTH INFORMATION MANAGEMENT | Facility: OTHER | Age: 66
End: 2021-07-23

## 2021-07-23 NOTE — NON-PROVIDER
Pt called in regarding [employer] message for STEPHANIE. Verified HIPAA. I scheduled the exam and provided all needed information.

## 2021-07-28 PROBLEM — I77.9 DISEASE OF ARTERY (HCC): Status: ACTIVE | Noted: 2021-07-28

## 2021-07-28 PROBLEM — R94.30 NONSPECIFIC ABNORMAL FUNCTION STUDY, CARDIOVASCULAR: Status: ACTIVE | Noted: 2021-07-28

## 2021-08-09 DIAGNOSIS — M1A.09X1 IDIOPATHIC CHRONIC GOUT OF MULTIPLE SITES WITH TOPHUS: ICD-10-CM

## 2021-08-09 NOTE — NON-PROVIDER
Inbound call from to follow-up on Allopurinol refill request that was sent by him via [employer] yesterday. Informed mbr that I would also send [place] a refill request. Mbr also had questions regarding wife's SCP plan, please see her chart.  No further needs at this time.

## 2021-08-09 NOTE — TELEPHONE ENCOUNTER
Received request via: Patient    Was the patient seen in the last year in this department? Yes    Does the patient have an active prescription (recently filled or refills available) for medication(s) requested? No     Tejinder Calvo called in to follow-up on Allopurinol refill request that was sent by him via [employer] yesterday.    Thank you,  [facility]

## 2021-08-10 RX ORDER — ALLOPURINOL 300 MG/1
300 TABLET ORAL
Qty: 30 TABLET | Refills: 0 | Status: SHIPPED | OUTPATIENT
Start: 2021-08-10 | End: 2021-08-12 | Stop reason: SDUPTHER

## 2021-08-12 DIAGNOSIS — M1A.09X1 IDIOPATHIC CHRONIC GOUT OF MULTIPLE SITES WITH TOPHUS: ICD-10-CM

## 2021-08-12 RX ORDER — ALLOPURINOL 300 MG/1
300 TABLET ORAL
Qty: 90 TABLET | Refills: 0 | Status: SHIPPED | OUTPATIENT
Start: 2021-08-12 | End: 2021-11-24

## 2021-08-23 ENCOUNTER — TELEPHONE (OUTPATIENT)
Dept: MEDICAL GROUP | Facility: MEDICAL CENTER | Age: 66
End: 2021-08-23

## 2021-08-23 ENCOUNTER — HOSPITAL ENCOUNTER (OUTPATIENT)
Dept: LAB | Facility: MEDICAL CENTER | Age: 66
End: 2021-08-23
Attending: NURSE PRACTITIONER
Payer: MEDICARE

## 2021-08-23 DIAGNOSIS — I87.2 CHRONIC VENOUS INSUFFICIENCY: ICD-10-CM

## 2021-08-23 DIAGNOSIS — Z98.890 HISTORY OF GASTRIC SURGERY: ICD-10-CM

## 2021-08-23 DIAGNOSIS — Z12.5 SCREENING FOR MALIGNANT NEOPLASM OF PROSTATE: ICD-10-CM

## 2021-08-23 DIAGNOSIS — I10 ESSENTIAL HYPERTENSION: ICD-10-CM

## 2021-08-23 DIAGNOSIS — N18.31 STAGE 3A CHRONIC KIDNEY DISEASE: ICD-10-CM

## 2021-08-23 DIAGNOSIS — M1A.09X1 IDIOPATHIC CHRONIC GOUT OF MULTIPLE SITES WITH TOPHUS: ICD-10-CM

## 2021-08-23 LAB
25(OH)D3 SERPL-MCNC: 30 NG/ML (ref 30–100)
ALBUMIN SERPL BCP-MCNC: 4.3 G/DL (ref 3.2–4.9)
ALBUMIN/GLOB SERPL: 1.5 G/DL
ALP SERPL-CCNC: 61 U/L (ref 30–99)
ALT SERPL-CCNC: 35 U/L (ref 2–50)
ANION GAP SERPL CALC-SCNC: 13 MMOL/L (ref 7–16)
AST SERPL-CCNC: 28 U/L (ref 12–45)
BASOPHILS # BLD AUTO: 0.4 % (ref 0–1.8)
BASOPHILS # BLD: 0.03 K/UL (ref 0–0.12)
BILIRUB SERPL-MCNC: 1.2 MG/DL (ref 0.1–1.5)
BUN SERPL-MCNC: 24 MG/DL (ref 8–22)
CALCIUM SERPL-MCNC: 9.2 MG/DL (ref 8.5–10.5)
CHLORIDE SERPL-SCNC: 103 MMOL/L (ref 96–112)
CHOLEST SERPL-MCNC: 143 MG/DL (ref 100–199)
CO2 SERPL-SCNC: 24 MMOL/L (ref 20–33)
CREAT SERPL-MCNC: 1.38 MG/DL (ref 0.5–1.4)
CREAT UR-MCNC: 224.6 MG/DL
EOSINOPHIL # BLD AUTO: 0.34 K/UL (ref 0–0.51)
EOSINOPHIL NFR BLD: 4.3 % (ref 0–6.9)
ERYTHROCYTE [DISTWIDTH] IN BLOOD BY AUTOMATED COUNT: 46 FL (ref 35.9–50)
FASTING STATUS PATIENT QL REPORTED: NORMAL
FOLATE SERPL-MCNC: 8.2 NG/ML
GLOBULIN SER CALC-MCNC: 2.8 G/DL (ref 1.9–3.5)
GLUCOSE SERPL-MCNC: 123 MG/DL (ref 65–99)
HCT VFR BLD AUTO: 44.4 % (ref 42–52)
HDLC SERPL-MCNC: 39 MG/DL
HGB BLD-MCNC: 14.2 G/DL (ref 14–18)
IMM GRANULOCYTES # BLD AUTO: 0.02 K/UL (ref 0–0.11)
IMM GRANULOCYTES NFR BLD AUTO: 0.3 % (ref 0–0.9)
LDLC SERPL CALC-MCNC: 77 MG/DL
LYMPHOCYTES # BLD AUTO: 2.09 K/UL (ref 1–4.8)
LYMPHOCYTES NFR BLD: 26.7 % (ref 22–41)
MCH RBC QN AUTO: 28.4 PG (ref 27–33)
MCHC RBC AUTO-ENTMCNC: 32 G/DL (ref 33.7–35.3)
MCV RBC AUTO: 88.8 FL (ref 81.4–97.8)
MICROALBUMIN UR-MCNC: <1.2 MG/DL
MICROALBUMIN/CREAT UR: NORMAL MG/G (ref 0–30)
MONOCYTES # BLD AUTO: 0.62 K/UL (ref 0–0.85)
MONOCYTES NFR BLD AUTO: 7.9 % (ref 0–13.4)
NEUTROPHILS # BLD AUTO: 4.74 K/UL (ref 1.82–7.42)
NEUTROPHILS NFR BLD: 60.4 % (ref 44–72)
NRBC # BLD AUTO: 0 K/UL
NRBC BLD-RTO: 0 /100 WBC
PLATELET # BLD AUTO: 207 K/UL (ref 164–446)
PMV BLD AUTO: 10.7 FL (ref 9–12.9)
POTASSIUM SERPL-SCNC: 3.7 MMOL/L (ref 3.6–5.5)
PROT SERPL-MCNC: 7.1 G/DL (ref 6–8.2)
RBC # BLD AUTO: 5 M/UL (ref 4.7–6.1)
SODIUM SERPL-SCNC: 140 MMOL/L (ref 135–145)
TRIGL SERPL-MCNC: 134 MG/DL (ref 0–149)
URATE SERPL-MCNC: 5.9 MG/DL (ref 2.5–8.3)
VIT B12 SERPL-MCNC: 599 PG/ML (ref 211–911)
WBC # BLD AUTO: 7.8 K/UL (ref 4.8–10.8)

## 2021-08-23 PROCEDURE — 82043 UR ALBUMIN QUANTITATIVE: CPT

## 2021-08-23 PROCEDURE — 80061 LIPID PANEL: CPT

## 2021-08-23 PROCEDURE — 36415 COLL VENOUS BLD VENIPUNCTURE: CPT

## 2021-08-23 PROCEDURE — 82306 VITAMIN D 25 HYDROXY: CPT

## 2021-08-23 PROCEDURE — 84154 ASSAY OF PSA FREE: CPT

## 2021-08-23 PROCEDURE — 84153 ASSAY OF PSA TOTAL: CPT

## 2021-08-23 PROCEDURE — 85025 COMPLETE CBC W/AUTO DIFF WBC: CPT

## 2021-08-23 PROCEDURE — 82607 VITAMIN B-12: CPT

## 2021-08-23 PROCEDURE — 80053 COMPREHEN METABOLIC PANEL: CPT

## 2021-08-23 PROCEDURE — 84550 ASSAY OF BLOOD/URIC ACID: CPT

## 2021-08-23 PROCEDURE — 82570 ASSAY OF URINE CREATININE: CPT

## 2021-08-23 PROCEDURE — 82746 ASSAY OF FOLIC ACID SERUM: CPT

## 2021-08-23 NOTE — TELEPHONE ENCOUNTER
ESTABLISHED PATIENT PRE-VISIT PLANNING     Patient was NOT contacted to complete PVP.  Note: Patient will not be contacted if there is no indication to call.     1.  Reviewed notes from the last few office visits within the medical group: Yes    2.  If any orders were placed at last visit or intended to be done for this visit (i.e. 6 mos follow-up), do we have Results/Consult Notes?   · Labs - Labs ordered, completed on 08/23/2021 and results are in chart.   Note: If patient appointment is for lab review and patient did not complete labs, check with provider if OK to reschedule patient until labs completed.  · Imaging - Imaging was not ordered at last office visit.  · Referrals - No referrals were ordered at last office visit.    3. Is this appointment scheduled as a Hospital Follow-Up? No    4.  Immunizations were updated in Epic using Reconcile Outside Information activity? Yes. Immunizations queried through Web-[employer] and reconciled from outside sources. Immunization records are up to date.    5.  Patient is due for the following Health Maintenance Topics:   There are no preventive care reminders to display for this patient.    6.  AHA (Pulse[employer]) form printed for Provider? Email sent to [facility] requesting form       ---------------------------------------------------------------------------------------------  This Pre-Visit Planning note has been created for the Provider and Medical Assistant to review prior to the patient's office appointment.   Patient is NOT REQUIRED to follow-up on this note.

## 2021-08-25 LAB
PSA FREE MFR SERPL: 32 %
PSA FREE SERPL-MCNC: 0.8 NG/ML
PSA SERPL-MCNC: 2.5 NG/ML (ref 0–4)

## 2021-08-30 ENCOUNTER — OFFICE VISIT (OUTPATIENT)
Dept: MEDICAL GROUP | Facility: MEDICAL CENTER | Age: 66
End: 2021-08-30
Payer: MEDICARE

## 2021-08-30 VITALS
DIASTOLIC BLOOD PRESSURE: 78 MMHG | SYSTOLIC BLOOD PRESSURE: 120 MMHG | TEMPERATURE: 97.3 F | HEIGHT: 72 IN | BODY MASS INDEX: 42.66 KG/M2 | WEIGHT: 315 LBS | HEART RATE: 103 BPM | OXYGEN SATURATION: 97 %

## 2021-08-30 DIAGNOSIS — I10 ESSENTIAL HYPERTENSION: ICD-10-CM

## 2021-08-30 DIAGNOSIS — E66.9 OBESITY (BMI 30-39.9): ICD-10-CM

## 2021-08-30 DIAGNOSIS — L97.919 VARICOSE ULCER OF LOWER EXTREMITY, RIGHT (HCC): ICD-10-CM

## 2021-08-30 DIAGNOSIS — N18.31 STAGE 3A CHRONIC KIDNEY DISEASE: ICD-10-CM

## 2021-08-30 DIAGNOSIS — I83.019 VARICOSE ULCER OF LOWER EXTREMITY, RIGHT (HCC): ICD-10-CM

## 2021-08-30 DIAGNOSIS — I73.9 PAD (PERIPHERAL ARTERY DISEASE) (HCC): ICD-10-CM

## 2021-08-30 DIAGNOSIS — E55.9 VITAMIN D DEFICIENCY: ICD-10-CM

## 2021-08-30 DIAGNOSIS — R73.9 HYPERGLYCEMIA: ICD-10-CM

## 2021-08-30 DIAGNOSIS — L29.9 ITCHING: ICD-10-CM

## 2021-08-30 DIAGNOSIS — E78.5 HYPERLIPIDEMIA LDL GOAL <100: ICD-10-CM

## 2021-08-30 PROBLEM — K43.2 INCISIONAL HERNIA, WITHOUT OBSTRUCTION OR GANGRENE: Status: ACTIVE | Noted: 2017-07-18

## 2021-08-30 LAB
HBA1C MFR BLD: 5.5 % (ref 0–5.6)
INT CON NEG: NORMAL
INT CON POS: NORMAL

## 2021-08-30 PROCEDURE — 83036 HEMOGLOBIN GLYCOSYLATED A1C: CPT | Performed by: NURSE PRACTITIONER

## 2021-08-30 PROCEDURE — 99214 OFFICE O/P EST MOD 30 MIN: CPT | Performed by: NURSE PRACTITIONER

## 2021-08-30 ASSESSMENT — FIBROSIS 4 INDEX: FIB4 SCORE: 1.53

## 2021-08-30 NOTE — ASSESSMENT & PLAN NOTE
Has PAD and varicose veins marcelina rt lower leg.  He is followed by vascular for this and has had procedures for correction.  Will have jpain in legs after walking fro 20 min.     LEONELA Cooley...Yes, this is for her Entresto 24-26 mg.     I s/w pt re: this matter and pt stated she has not used our 30 day free card. I advised I would provide this for her along w/ a Rx for it, as well as 20 day supply of samples. I also advised I would initiate pt assist tomorrow once I return to office and would contact her once I receive a response from that. Pt verbalized much appreciation.    OG Hdez

## 2021-08-30 NOTE — PROGRESS NOTES
Subjective:     Max Alberto is a 66 y.o. male who presents with HTN.    HPI:   Seen in f/u for HTN.  He is feeling well.   Bp is stable and controlled on meds.  Taking meds approp.   He is going to see his vascular surgery for routine f/u since he has a large varicose vein in back of rt leg.  He does have pain with walking after 20 min.  He drinks coffee but not much water.  He is on diuretic for his bp.    Reviewed lab with pt.  GFR is down from 56 to 51.  He admits to not drinking enough water.  Uric acid, folate, b12, PSA, alb/cr ratio, CBC is wnl.   Vitamin d is low normal at 30. n ot on otc supplement.   LP shows good trg and LDL.  HDL is low at 39.  Not exercising with isollation d/t pandemic.  Not always on a healthy diet.   CMP is wnl except glucose elevated at 123.  a1c in ofc is 5.5.  Not on meds for DM.    He takes tacrolimus for itching under his beard.  Needs refill.  He has been to see derm but med that they gav ehim was $$ and did not help. May need to see derm but he doesn't like the one that he sees.      Varicose ulcer of lower extremity, right ([facility])  Has PAD and varicose veins marcelina rt lower leg.  He is followed by vascular for this and has had procedures for correction.  Will have jpain in legs after walking fro 20 min.          Patient Active Problem List    Diagnosis Date Noted   • Varicose ulcer of lower extremity, right ([facility]) 08/30/2021   • Arthritis    • Decreased hearing of right ear 10/01/2020   • Morbid obesity with BMI of 40.0-44.9, adult ([facility]) 09/04/2020   • 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/2018   • Incisional hernia, without obstruction or gangrene 07/18/2017   • Chronic kidney disease (CKD), stage III (moderate) ([facility]) 01/12/2017   • Chronic lower back pain 06/23/2015   • Dermatitis, seborrheic 06/23/2015   • Dental disorder 12/17/2014   • Tinnitus of both ears 10/13/2014   • Chronic venous insufficiency 09/04/2014   • HTN (hypertension)  09/04/2014   • Migraines 09/04/2014   • GERD (gastroesophageal reflux disease) 09/04/2014   • Seasonal allergies 09/04/2014   • Sleep apnea with use of continuous positive airway pressure (CPAP) 09/04/2014       Current medicines (including changes today)  Current Outpatient Medications   Medication Sig Dispense Refill   • allopurinol (ZYLOPRIM) 300 MG Tab Take 1 Tablet by mouth every day. 90 Tablet 0   • tacrolimus (PROTOPIC) 0.1 % Ointment Apply 1 Dose topically 2 times a day. Indications: Psoriasis     • triamcinolone acetonide (KENALOG) 0.1 % Ointment Apply 1 Dose topically 2 times a day.     • ketoconazole (NIZORAL) 2 % shampoo Apply 5 mL topically 1 time a day as needed for Itching. 100 mL 1   • amoxicillin (AMOXIL) 500 MG Cap TAKE 4 CAPS BY MOUTH before dentist appointment 30 capsule 0   • fluticasone (FLONASE) 50 MCG/ACT nasal spray Administer 1 Spray into affected nostril(S) 2 times a day. 16 mL 0   • omeprazole (PRILOSEC) 20 MG delayed-release capsule Take 1 Cap by mouth every day. 90 Cap 3   • triamterene-hctz (MAXZIDE-25/DYAZIDE) 37.5-25 MG Tab Take 1 Tab by mouth every day. 90 Tab 3   • multivitamin (THERAGRAN) Tab Take 1 Tab by mouth every day.     • acetaminophen (TYLENOL) 500 MG Tab Take 1,000 mg by mouth every 6 hours as needed (MIGRAINE).     • vitamin D (CHOLECALCIFEROL) 1000 UNIT Tab Take 2,000 Units by mouth every day.       No current facility-administered medications for this visit.       No Known Allergies    ROS  Constitutional: Negative. Negative for fever, chills, weight loss, malaise/fatigue and diaphoresis.   HENT: Negative. Negative for hearing loss, ear pain, nosebleeds, congestion, sore throat, neck pain, tinnitus and ear discharge.   Respiratory: Negative. Negative for cough, hemoptysis, sputum production, shortness of breath, wheezing and stridor.   Cardiovascular: Negative. Negative for chest pain, palpitations, orthopnea, claudication, leg swelling and PND.   Gastrointestinal:  Denies nausea, vomiting, diarrhea, constipation, heartburn, melena or hematochezia.  Genitourinary: Denies dysuria, hematuria, urinary incontinence, frequency or urgency.        Objective:     /78 (BP Location: Right arm, Patient Position: Sitting)   Pulse (!) 103   Temp 36.3 °C (97.3 °F) (Temporal)   Ht 1.829 m (6')   Wt (!) 145 kg (318 lb 12.8 oz)   SpO2 97%  Body mass index is 43.24 kg/m².    Physical Exam:  Vitals reviewed.  Constitutional: Oriented to person, place, and time. appears well-developed and well-nourished. No distress.   Cardiovascular: Normal rate, regular rhythm, normal heart sounds and intact distal pulses. Exam reveals no gallop and no friction rub. No murmur heard. No carotid bruits.   Pulmonary/Chest: Effort normal and breath sounds normal. No stridor. No respiratory distress. no wheezes or rales. exhibits no tenderness.   Musculoskeletal: Normal range of motion. exhibits no edema. opal pedal pulses 2+.  Lymphadenopathy: No cervical or supraclavicular adenopathy.   Neurological: Alert and oriented to person, place, and time. exhibits normal muscle tone.  Skin: Skin is warm and dry. No diaphoresis.   Psychiatric: Normal mood and affect. Behavior is normal.      Assessment and Plan:     The following treatment plan was discussed:    1. Essential hypertension      stable and controlled on meds   2. Hyperglycemia  POCT Hemoglobin A1C    glucose high on lab but a1c in office 5.5.  not on meds for DM.  enc pt to improve healthy diet and inc regular exercise.    3. PAD (peripheral artery disease) ([facility])      has varicose veins.  followed by vascular.     4. Stage 3a chronic kidney disease ([facility])      enc pt to inc water intake .  he agrees   5. Vitamin D deficiency      vitamin d is low.  take at leats 2000 units d3 daily   6. Hyperlipidemia LDL goal <100      LDL and trg controlled.  chronic low HDL.  enc pt to improve healthy lifestyle    7. Itching      refilled med for itching scalp and  beard.  no rash noted   8. Varicose ulcer of lower extremity, right (HCC)      having inc in size of varicose vein with some pain.  will f/u with vascular   9. Obesity (BMI 30-39.9)             Followup: 1 yr.  Call for lab slip

## 2021-08-30 NOTE — PROGRESS NOTES
Annual Health Assessment Questions:    1.  Are you currently engaging in any exercise or physical activity? Yes    2.  How would you describe your mood or emotional well-being today? good    3.  Have you had any falls in the last year? No    4.  Have you noticed any problems with your balance or had difficulty walking? YES    5.  In the last six months have you experienced any leakage of urine? No    6. DPA/Advanced Directive: Patient does not have an Advanced Directive.  A packet and workshop information was given on Advanced Directives.

## 2021-09-01 RX ORDER — TACROLIMUS 1 MG/G
1 OINTMENT TOPICAL 2 TIMES DAILY
Qty: 30 G | Refills: 2 | Status: SHIPPED | OUTPATIENT
Start: 2021-09-01 | End: 2023-05-02

## 2021-09-01 NOTE — TELEPHONE ENCOUNTER
Pt called and said the tacrolimus ointment was not sent to [facility]. Can you please resend?

## 2021-11-11 ENCOUNTER — PATIENT MESSAGE (OUTPATIENT)
Dept: MEDICAL GROUP | Facility: MEDICAL CENTER | Age: 66
End: 2021-11-11

## 2021-11-26 DIAGNOSIS — I10 ESSENTIAL HYPERTENSION: ICD-10-CM

## 2021-11-26 DIAGNOSIS — K21.9 GASTROESOPHAGEAL REFLUX DISEASE: ICD-10-CM

## 2021-11-26 RX ORDER — TRIAMTERENE AND HYDROCHLOROTHIAZIDE 37.5; 25 MG/1; MG/1
TABLET ORAL
Qty: 90 TABLET | Refills: 3 | Status: SHIPPED | OUTPATIENT
Start: 2021-11-26 | End: 2022-12-31 | Stop reason: SDUPTHER

## 2021-11-26 RX ORDER — OMEPRAZOLE 20 MG/1
CAPSULE, DELAYED RELEASE ORAL
Qty: 90 CAPSULE | Refills: 3 | Status: SHIPPED | OUTPATIENT
Start: 2021-11-26 | End: 2022-12-31 | Stop reason: SDUPTHER

## 2022-01-31 PROBLEM — D18.03 LIVER HEMANGIOMA: Status: ACTIVE | Noted: 2022-01-31

## 2022-01-31 PROBLEM — N18.31 CHRONIC KIDNEY DISEASE (CKD) STAGE G3A/A1, MODERATELY DECREASED GLOMERULAR FILTRATION RATE (GFR) BETWEEN 45-59 ML/MIN/1.73 SQUARE METER AND ALBUMINURIA CREATININE RATIO LESS THAN 30 MG/G: Status: ACTIVE | Noted: 2017-01-12

## 2022-01-31 PROBLEM — E27.8 ADRENAL NODULE (HCC): Status: ACTIVE | Noted: 2022-01-31

## 2022-01-31 PROBLEM — E27.9 ADRENAL NODULE (HCC): Status: ACTIVE | Noted: 2022-01-31

## 2022-02-01 ENCOUNTER — OFFICE VISIT (OUTPATIENT)
Dept: MEDICAL GROUP | Facility: MEDICAL CENTER | Age: 67
End: 2022-02-01
Payer: MEDICARE

## 2022-02-01 VITALS
SYSTOLIC BLOOD PRESSURE: 130 MMHG | TEMPERATURE: 97 F | BODY MASS INDEX: 41.85 KG/M2 | OXYGEN SATURATION: 97 % | HEART RATE: 88 BPM | HEIGHT: 72 IN | DIASTOLIC BLOOD PRESSURE: 82 MMHG | WEIGHT: 309 LBS

## 2022-02-01 DIAGNOSIS — N18.31 STAGE 3A CHRONIC KIDNEY DISEASE: ICD-10-CM

## 2022-02-01 DIAGNOSIS — G47.30 SLEEP APNEA, UNSPECIFIED TYPE: ICD-10-CM

## 2022-02-01 DIAGNOSIS — E27.8 ADRENAL NODULE (HCC): ICD-10-CM

## 2022-02-01 DIAGNOSIS — R21 FACIAL RASH: ICD-10-CM

## 2022-02-01 DIAGNOSIS — I73.9 PAD (PERIPHERAL ARTERY DISEASE) (HCC): ICD-10-CM

## 2022-02-01 DIAGNOSIS — E66.01 MORBID OBESITY (HCC): ICD-10-CM

## 2022-02-01 PROCEDURE — 99214 OFFICE O/P EST MOD 30 MIN: CPT | Performed by: NURSE PRACTITIONER

## 2022-02-01 RX ORDER — TRIAMCINOLONE ACETONIDE 1 MG/G
1 OINTMENT TOPICAL 2 TIMES DAILY
Qty: 15 G | Refills: 1 | Status: SHIPPED | OUTPATIENT
Start: 2022-02-01 | End: 2022-10-02 | Stop reason: SDUPTHER

## 2022-02-01 ASSESSMENT — FIBROSIS 4 INDEX: FIB4 SCORE: 1.53

## 2022-02-01 NOTE — PROGRESS NOTES
Subjective:     Max Alberto is a 66 y.o. male who presents with sleep apnea.    HPI:   Seen in f/u for sleep apnea.    He recently went to [employer] football game.  Then he went to  for a week.  His rt leg started hurting.  It became more severe.  He called his vascular specialist.  They did us that was neg.  He had just overdid his leg.  It took 7 weeks for the pain to finally resolve.  He wears good supprotive shoes and compression hose.  He had 2 procedures on left and 1 on rt for varocosities last year.  He is followed by Dr Graham.  He still prob needs 2 procedures on his leg but not ready to do them yet.   He uses triamcinolone crm on facial rash.  Flaking on forehead, eye brows.  Working well but needs refill. He has multiple creams that he uses on his face.    He has been on CPAP for sleep apnea in the past.  He stopped an does not see [facility] or have CPAP at this time.  His wife has noted that he is snoringmore.  He would like to go back to [facility] sleep med for restart CPAP.  He does not have a chronic rt lower leg ulcer.  That is healed.   During appt today with review of past MR noted CT abd done in 2019 that found indeterminate adrenal nodules.  MRI was recommended for further assessment.  This has not been done.  Will order today.  Pt is in agreement.  Otherwise he is feeling well.  His last CMP showed GFR 51.  He doesn't feel that he drinks enough water.         Patient Active Problem List    Diagnosis Date Noted   • Liver hemangioma 01/31/2022   • Adrenal nodule (HCC) 01/31/2022   • Arthritis    • Decreased hearing of right ear 10/01/2020   • Morbid obesity with BMI of 40.0-44.9, adult (HCC) 09/04/2020   • 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/2018   • Incisional hernia, without obstruction or gangrene 07/18/2017   • Stage 3a chronic kidney disease (HCC) 01/12/2017   • Chronic lower back pain 06/23/2015   • Dermatitis, seborrheic 06/23/2015   • Dental disorder 12/17/2014   •  Tinnitus of both ears 10/13/2014   • Chronic venous insufficiency 09/04/2014   • HTN (hypertension) 09/04/2014   • Migraines 09/04/2014   • GERD (gastroesophageal reflux disease) 09/04/2014   • Seasonal allergies 09/04/2014   • Sleep apnea with use of continuous positive airway pressure (CPAP) 09/04/2014       Current medicines (including changes today)  Current Outpatient Medications   Medication Sig Dispense Refill   • triamcinolone acetonide (KENALOG) 0.1 % Ointment Apply 1 Dose topically 2 times a day. 15 g 1   • omeprazole (PRILOSEC) 20 MG delayed-release capsule TAKE 1 CAPSULE BY MOUTH EVERY DAY 90 Capsule 3   • triamterene-hctz (MAXZIDE-25/DYAZIDE) 37.5-25 MG Tab TAKE 1 TABLET BY MOUTH EVERY DAY 90 Tablet 3   • allopurinol (ZYLOPRIM) 300 MG Tab TAKE 1 TABLET BY MOUTH EVERY DAY 90 Tablet 3   • tacrolimus (PROTOPIC) 0.1 % Ointment Apply 1 Dose topically 2 times a day. Indications: Psoriasis 30 g 2   • ketoconazole (NIZORAL) 2 % shampoo Apply 5 mL topically 1 time a day as needed for Itching. 100 mL 1   • amoxicillin (AMOXIL) 500 MG Cap TAKE 4 CAPS BY MOUTH before dentist appointment 30 capsule 0   • fluticasone (FLONASE) 50 MCG/ACT nasal spray Administer 1 Spray into affected nostril(S) 2 times a day. 16 mL 0   • multivitamin (THERAGRAN) Tab Take 1 Tab by mouth every day.     • acetaminophen (TYLENOL) 500 MG Tab Take 1,000 mg by mouth every 6 hours as needed (MIGRAINE).     • vitamin D (CHOLECALCIFEROL) 1000 UNIT Tab Take 2,000 Units by mouth every day.       No current facility-administered medications for this visit.       No Known Allergies    ROS  Constitutional: Negative. Negative for fever, chills, weight loss, malaise/fatigue and diaphoresis.   HENT: Negative. Negative for hearing loss, ear pain, nosebleeds, congestion, sore throat, neck pain, tinnitus and ear discharge.   Respiratory: Negative. Negative for cough, hemoptysis, sputum production, shortness of breath, wheezing and stridor.    Cardiovascular: Negative. Negative for chest pain, palpitations, orthopnea, claudication, leg swelling and PND.   Gastrointestinal: Denies nausea, vomiting, diarrhea, constipation, heartburn, melena or hematochezia.  Genitourinary: Denies dysuria, hematuria, urinary incontinence, frequency or urgency.        Objective:     /82 (BP Location: Right arm, Patient Position: Sitting)   Pulse 88   Temp 36.1 °C (97 °F) (Temporal)   Ht 1.829 m (6')   Wt (!) 140 kg (309 lb)   SpO2 97%  Body mass index is 41.91 kg/m².    Physical Exam:  Vitals reviewed.  Constitutional: Oriented to person, place, and time. appears well-developed and well-nourished. No distress.   Cardiovascular: Normal rate, regular rhythm, normal heart sounds and intact distal pulses. Exam reveals no gallop and no friction rub. No murmur heard. No carotid bruits.   Pulmonary/Chest: Effort normal and breath sounds normal. No stridor. No respiratory distress. no wheezes or rales. exhibits no tenderness.   Musculoskeletal: Normal range of motion. exhibits no edema. opal pedal pulses 2+.  Lymphadenopathy: No cervical or supraclavicular adenopathy.   Neurological: Alert and oriented to person, place, and time. exhibits normal muscle tone.  Skin: Skin is warm and dry. No diaphoresis.   Psychiatric: Normal mood and affect. Behavior is normal.      Assessment and Plan:     The following treatment plan was discussed:    1. Sleep apnea, unspecified type  Referral to Pulmonary and Sleep Medicine    would like to restart CPAP d/t inc snoring he r/t inc wt.  refer pulm sleep med   2. Adrenal nodule (HCC)  CT-ABDOMEN-PELVIS WITH    MR-ABDOMEN-WITH & W/O    MRI abd for opal adrenal nodules.  f/u w/pt w/results.     3. PAD (peripheral artery disease) (HCC)      has pain in legs, marcelina rt leg with overwalking.  followed by pulm.   4. Facial rash  triamcinolone acetonide (KENALOG) 0.1 % Ointment    followed by derm.  needs med refill for kenalog.  working well.   5.  Stage 3a chronic kidney disease (HCC)      inc pt to inc water intake. plan to redo lab 8/22.  call for lab slip   6. Morbid obesity (HCC)           Followup: Return in about 6 months (around 8/1/2022).

## 2022-02-01 NOTE — PROGRESS NOTES
Annual Health Assessment Questions:    1.  Are you currently engaging in any exercise or physical activity? No    2.  How would you describe your mood or emotional well-being today? good    3.  Have you had any falls in the last year? No    4.  Have you noticed any problems with your balance or had difficulty walking? No    5.  In the last six months have you experienced any leakage of urine? No    6. DPA/Advanced Directive: Patient has Advanced Directive, but it is not on file. Instructed to bring in a copy to scan into their chart.

## 2022-02-18 ENCOUNTER — HOSPITAL ENCOUNTER (OUTPATIENT)
Dept: RADIOLOGY | Facility: MEDICAL CENTER | Age: 67
End: 2022-02-18
Attending: NURSE PRACTITIONER
Payer: MEDICARE

## 2022-02-18 DIAGNOSIS — E27.8 ADRENAL NODULE (HCC): ICD-10-CM

## 2022-02-18 PROCEDURE — 700117 HCHG RX CONTRAST REV CODE 255: Performed by: NURSE PRACTITIONER

## 2022-02-18 PROCEDURE — A9576 INJ PROHANCE MULTIPACK: HCPCS | Performed by: NURSE PRACTITIONER

## 2022-02-18 PROCEDURE — 74183 MRI ABD W/O CNTR FLWD CNTR: CPT | Mod: MF

## 2022-02-18 RX ADMIN — GADOTERIDOL 20 ML: 279.3 INJECTION, SOLUTION INTRAVENOUS at 08:51

## 2022-02-19 ENCOUNTER — TELEPHONE (OUTPATIENT)
Dept: MEDICAL GROUP | Facility: MEDICAL CENTER | Age: 67
End: 2022-02-19
Payer: MEDICARE

## 2022-02-20 NOTE — TELEPHONE ENCOUNTER
Please let pt know that the MRI abd shows all stable with the opal adrenal nodules, fatty enlarged liver, pancreatic cyst and angiolipoma.  Will need to repeat in 2 yrs

## 2022-03-30 ENCOUNTER — OFFICE VISIT (OUTPATIENT)
Dept: SLEEP MEDICINE | Facility: MEDICAL CENTER | Age: 67
End: 2022-03-30
Payer: MEDICARE

## 2022-03-30 VITALS
SYSTOLIC BLOOD PRESSURE: 128 MMHG | HEART RATE: 92 BPM | DIASTOLIC BLOOD PRESSURE: 88 MMHG | WEIGHT: 314 LBS | RESPIRATION RATE: 16 BRPM | BODY MASS INDEX: 42.53 KG/M2 | HEIGHT: 72 IN | OXYGEN SATURATION: 96 %

## 2022-03-30 DIAGNOSIS — R53.82 CHRONIC FATIGUE, UNSPECIFIED: ICD-10-CM

## 2022-03-30 DIAGNOSIS — R06.83 SNORING: ICD-10-CM

## 2022-03-30 DIAGNOSIS — R06.81 WITNESSED EPISODE OF APNEA: ICD-10-CM

## 2022-03-30 DIAGNOSIS — G47.33 OSA (OBSTRUCTIVE SLEEP APNEA): ICD-10-CM

## 2022-03-30 PROCEDURE — 99204 OFFICE O/P NEW MOD 45 MIN: CPT | Performed by: PREVENTIVE MEDICINE

## 2022-03-30 ASSESSMENT — FIBROSIS 4 INDEX: FIB4 SCORE: 1.53

## 2022-03-30 NOTE — PROGRESS NOTES
"CC: \" I guess I need a sleep study.\"        HPI:  Max Alberto is a 66 y.o.male  kindly referred by BANDAR Arana. Past medical history of sleep apnea, CHK stage III, heartburn, hypertension, obesity, and snoring.     Sleep History: EPWORTH score 10/24 which is consistent with an increase in mild daytime sleepiness.  This patient gives a history of sleep studies done in 2002 and 2008 in [place], [place] and/or [place]. He was started on CPAP in the early 2000s and then changed to BiPAP in 2014. This was because he had gastric sleeve done in 2013 and lost approximately 60lbs. Currently now patient is not using PAP therapy. He does not feel refreshed when he wakes in the morning and he naps daily at least an hour. He does not drink alcohol because he stopped 27 years and he is a past user of tobacco products using a 1/2 pack a day for 30 years. He also uses caffeinated beverages at least 3 12 ounce cups per day.     NO sleep studies are available for inclusion today.         Patient Active Problem List    Diagnosis Date Noted   • Liver hemangioma 01/31/2022   • Adrenal nodule (HCC) 01/31/2022   • Arthritis    • Decreased hearing of right ear 10/01/2020   • Morbid obesity with BMI of 40.0-44.9, adult ([facility]) 09/04/2020   • 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/2018   • Incisional hernia, without obstruction or gangrene 07/18/2017   • Stage 3a chronic kidney disease (HCC) 01/12/2017   • Chronic lower back pain 06/23/2015   • Dermatitis, seborrheic 06/23/2015   • Dental disorder 12/17/2014   • Tinnitus of both ears 10/13/2014   • Chronic venous insufficiency 09/04/2014   • HTN (hypertension) 09/04/2014   • Migraines 09/04/2014   • GERD (gastroesophageal reflux disease) 09/04/2014   • Seasonal allergies 09/04/2014   • Sleep apnea with use of continuous positive airway pressure (CPAP) 09/04/2014       Past Medical History:   Diagnosis Date   • Adrenal nodule (HCC) 1/31/2022   • " Arthritis     Osteo-generalized   • Chronic kidney disease (CKD), stage III (moderate) ([facility]) 1/12/2017   • Chronic lower back pain 6/23/2015   • Chronic venous insufficiency 9/4/2014   • Decreased hearing of right ear 10/1/2020   • Dental disorder 12/17/2014    had two extracted last week 12/09/14=were infected    • Dermatitis, seborrheic 6/23/2015     IMO load March 2020   • GERD (gastroesophageal reflux disease)    • Headache, classical migraine    • HTN (hypertension) 9/4/2014   • Idiopathic chronic gout of multiple sites with tophus 7/17/2018   • Incisional hernia, without obstruction or gangrene 07/18/2017   • Liver hemangioma 1/31/2022   • Morbid obesity with BMI of 40.0-44.9, adult ([facility]) 9/4/2020   • Seasonal allergies 9/4/2014   • Sleep apnea     BiPAP   • Tinnitus of both ears 10/13/2014        Past Surgical History:   Procedure Laterality Date   • IN LAP,DIAGNOSTIC ABDOMEN  8/7/2019    Procedure: LAPAROSCOPY-DIAGNOSTIC;  Surgeon: Julian Carmen M.D.;  Location: [facility];  Service: General   • LAPAROSCOPIC LYSIS OF ADHESIONS  8/7/2019    Procedure: LYSIS, ADHESIONS, LAPAROSCOPIC;  Surgeon: Julian Carmen M.D.;  Location: [facility];  Service: General   • INCISION HERNIA REPAIR  8/7/2019    Procedure: REPAIR, HERNIA, INCISIONAL- POSSIBLE;  Surgeon: Julian Carmen M.D.;  Location: [facility];  Service: General   • VENTRAL HERNIA REPAIR LAPAROSCOPIC  10/1/2018    Procedure: VENTRAL HERNIA REPAIR LAPAROSCOPIC- FOR INCISIONAL HERNIA WITH MESH;  Surgeon: Julian Carmen M.D.;  Location: [facility];  Service: General   • GASTRIC SLEEVE LAPAROSCOPY  12/29/2014    Performed by Julian Carmen M.D. at [facility]   • KNEE REPLACEMENT, TOTAL  2012    left   • OTHER ORTHOPEDIC SURGERY  1998    bicep tendon repair   • APPENDECTOMY     • CHOLECYSTECTOMY         Family History   Problem Relation Age of Onset   • Diabetes Mother    • Arthritis Mother    • Lung  Disease Father    • Arthritis Father    • Heart Disease Father    • Alcohol/Drug Brother        Social History     Socioeconomic History   • Marital status:      Spouse name: Not on file   • Number of children: Not on file   • Years of education: Not on file   • Highest education level: Associate degree: occupational, technical, or vocational program   Occupational History   • Not on file   Tobacco Use   • Smoking status: Former Smoker     Packs/day: 1.00     Years: 25.00     Pack years: 25.00     Types: Cigarettes     Quit date: 1994     Years since quittin.2   • Smokeless tobacco: Never Used   Vaping Use   • Vaping Use: Never used   Substance and Sexual Activity   • Alcohol use: Not Currently     Alcohol/week: 0.5 oz     Types: 1 Cans of beer per week     Comment: one a month   • Drug use: No   • Sexual activity: Yes     Partners: Female   Other Topics Concern   • Not on file   Social History Narrative   • Not on file     Social Determinants of Health     Financial Resource Strain: Not on file   Food Insecurity: Not on file   Transportation Needs: Not on file   Physical Activity: Not on file   Stress: Not on file   Social Connections: Not on file   Intimate Partner Violence: Not on file   Housing Stability: Not on file       Current Outpatient Medications   Medication Sig Dispense Refill   • triamcinolone acetonide (KENALOG) 0.1 % Ointment Apply 1 Dose topically 2 times a day. 15 g 1   • omeprazole (PRILOSEC) 20 MG delayed-release capsule TAKE 1 CAPSULE BY MOUTH EVERY DAY 90 Capsule 3   • triamterene-hctz (MAXZIDE-25/DYAZIDE) 37.5-25 MG Tab TAKE 1 TABLET BY MOUTH EVERY DAY 90 Tablet 3   • allopurinol (ZYLOPRIM) 300 MG Tab TAKE 1 TABLET BY MOUTH EVERY DAY 90 Tablet 3   • tacrolimus (PROTOPIC) 0.1 % Ointment Apply 1 Dose topically 2 times a day. Indications: Psoriasis 30 g 2   • ketoconazole (NIZORAL) 2 % shampoo Apply 5 mL topically 1 time a day as needed for Itching. 100 mL 1   • amoxicillin  "(AMOXIL) 500 MG Cap TAKE 4 CAPS BY MOUTH before dentist appointment 30 capsule 0   • fluticasone (FLONASE) 50 MCG/ACT nasal spray Administer 1 Spray into affected nostril(S) 2 times a day. 16 mL 0   • multivitamin (THERAGRAN) Tab Take 1 Tab by mouth every day.     • acetaminophen (TYLENOL) 500 MG Tab Take 1,000 mg by mouth every 6 hours as needed (MIGRAINE).     • vitamin D (CHOLECALCIFEROL) 1000 UNIT Tab Take 2,000 Units by mouth every day.       No current facility-administered medications for this visit.    \"CURRENT RX\"    ALLERGIES: Patient has no known allergies.    ROS- chronic fatigue     Constitutional: Denies fever, chills, sweats,  weight loss, fatigue  Cardiovascular: Denies chest pain, tightness, palpitations, swelling in legs/feet, fainting, difficulty breathing when lying down but gets better when sitting up.   Respiratory: Denies shortness of breath, cough, wheezing, painful breathing.   Sleep: per HPI  Gastrointestinal: Denies  difficulty swallowing, nausea, abdominal pain, diarrhea, constipation, endorses heartburn.  Musculoskeletal: Denies painful joints, sore muscles, back pain.        PHYSICAL EXAM    /88 (BP Location: Left arm, Patient Position: Sitting, BP Cuff Size: Large adult)   Pulse 92   Resp 16   Ht 1.829 m (6')   Wt (!) 142 kg (314 lb)   SpO2 96%   Neck circumference: 17 in  Appearance: Well-nourished, looks stated age no acute distress  Eyes:   EOMI  ENMT: masked  Neck:  trachea midline,   Respiratory effort:  No intercostal retractions or use of accessory muscles  Musculoskeletal:  Grossly normal; gait and station normal;   Neurologic: oriented to person, time, place, and purpose; judgement intact  Psychiatric:  No depression, anxiety, agitation      Medical Decision Making       The medical record was reviewed in its entirety including the referral notes, records from primary care, consultants notes, hospital records, lab, imaging, microbiology, immunology, and " immunizations.  Care gaps identified and reviewed with the patient.    Diagnostic and titration nocturnal polysomnogram's, home sleep apnea tests, continuous nocturnal oximetry results, multiple sleep latency tests, and compliance reports reviewed.    ASSESSMENT/PLAN:      1. DEDRA (obstructive sleep apnea)    - Overnight Home Sleep Study; Future    2. Snoring    - Overnight Home Sleep Study; Future    3. Witnessed episode of apnea    - Overnight Home Sleep Study; Future    4. Chronic fatigue, unspecified    - Overnight Home Sleep Study; Future            The risks of untreated sleep apnea were discussed with the patient at length. Patients with DEDRA are at increased risk of cardiovascular disease including coronary artery disease, systemic arterial hypertension, pulmonary arterial hypertension, cardiac arrythmias, and stroke. DEDRA patients have an increased risk of motor vehicle accidents, type 2 diabetes, chronic kidney disease, and non-alcoholic liver disease. The patient was advised to avoid driving a motor vehicle when drowsy.    RTC:2 weeks after sleep study to discuss results.         My total time spent caring for the patient on the day of the encounter was 45 minutes. This includes time time spent on a thorough chart review including other physician notes, any type of sleep study, as well as critical labs and pulmonary and cardiac studies.  Additionally it includes discussions of good sleep hygiene, stimulus control and going over the need for consistency in terms of sleep preparation and practice.     Please note that this dictation was created using voice recognition software.  I have made every reasonable attempt to correct obvious errors, I expect that there are errors of grammar and possibly content that I did not discover before finalizing this note.  Answers for HPI/ROS submitted by the patient on 3/27/2022  Year of your last physical exam: 9/2021  Results of exam: See My Chart  Occupation :  Retired  Height: 6-1/2  Current weight: 305  6 months ago: 321  At age 19: 200  What is the reason for your visit today?: Wife says Snoring/breathing issues while sleeping, falling asleep in afternoons  Name of person referring you to the Sleep Center: [place] Chen  Have you ever been hospitalized?: Yes  Reason, year, and hospital in which you were hospitalized:: 1985 Gall Bladder removal, [place]. 1998 right torn bicep reattachment, [facility]. 2012 left knee replacement, [place]. 2013 gastric sleeve, [facility].  Have you ever had problems with anesthesia?: No  What year did you receive your last Flu shot?: 2021  What year did you receive you last Pneumonia shot?: 2020  Please briefly describe your sleep problem and how old you were when it began.: Was originally dignosed mid 2000  How does this affect your daily life and activities? Please also rate how serious of a problem this is (1 = Not at all, 10 = Very Serious).: ?  Have you had any previous evaluations, examinations, or treatment for this sleep problem or any other problems with your sleep? If so, please describe the evaluation, treatment, and results.: Easier to explain in person  Have you used any medications (prescribed or otherwise) to help your sleep problem? If yes, include name, amount, frequency, and the prescribing physician.: 1st CPAP machine, 2nd BiPAP machine  If employed, what time do you usually start and end work?: Retired  What time do you usually go to bed and wake up on: Weekdays? Weekends?: Sleep-between 9-11pm, wake up multiple times during night but get up- between 3:30-5:00am  Do you have a regular bed partner?: Yes  How many minutes does it usually take to fall asleep at night after turning off the lights?: 1-3 mins  What do you ordinarily do just prior to turning out the lights and attempting to go to sleep (e.g., reading, TV, baths, etc.)?: TV  On average, how many times do you wake up during the night?: 2-5  On average, how many  times do you wake up to use the bathroom?: When I get up  Do you often wake up too early in the morning and are unable to return to sleep?: Yes  On average, how many hours of sleep do you get per night?: Depends on what I did during the day  How do you usually awaken?  Alarm, spontaneously, or other?: Spontaneously  Is it difficult for you to awaken and get out of bed after sleeping? (Not at all, Sometimes, Very): Not at all  Do you nap or return to bed after arising?: Sometimes  Are you bothered by sleepiness during the day?: Sometimes  Do you feel that you get too much sleep at night?: ?  Do you feel that you get too little sleep at night?: ?  Do you usually feel tired during the day? If so, what do you attribute this to?: Somewhat  Do you find yourself falling asleep when you don't mean to? : Sometimes  If yes, how long does your sleep episode last?: 40 winks to 1 hour  Do you feel rested or refreshed after the sleep episode?: Yes  Have you ever suddenly fallen?: Yes.  Have you ever experienced sudden body weakness?: Sometimes  If yes, were you aware of the things around you?: Yes  Have you ever experienced weakness or paralysis upon going to sleep?: ?  Have you ever experienced weakness or paralysis upon awakening from sleep?: ?  Have you ever experienced seeing things or hearing voices/noises: That weren't real? On going to sleep? During the night? On awakening from sleep? During the day?: ?  Do you have difficulty breathing at night? If yes, briefly describe.: Wake up with dry mouth  How many times per week?: Almost every day  How did you become aware of this, at what age did this first occur, and how many years has this occurred?: ?  Have you been told you snore while asleep? If so, does it disturb a bed partner (or someone in the same room), or someone in the next room?: Yes  Have you ever experienced doing something without being aware of the action? If yes, please describe.: No  Have you ever experienced  "upon lying in bed before sleep or on awakening from sleep: Restlessness of legs, \"nervous legs\", \"creeping crawling\" sensation of legs, or twitching of legs?: Sometimes  Does anything relieve the sensations (e.g., getting out of bed, medication, massage)?: Yes  Have you ever been told that your arms or legs jerk or twitch while you are asleep? If yes, how many times per night does this occur?: No  Do you know, or have you ever been told that you do any of the following while sleeping: talk, walk, grit teeth, wet the bed, wake up screaming or seemingly afraid, have disturbing dreams, have unusual movements, wake up with headaches, (males) have erections? If yes to any of these, please indicate how many times per week, age started, last occurrence, treatment received.: Talk, headaches, dreams  Has anyone in your family been known to have any sleep problems? If yes, please list the type of problem (e.g., trouble getting to sleep, too sleepy, bed wetting, etc.), the relationship of this person to you, and the treatment received.: Two sons with sleep apnea      "

## 2022-05-11 ENCOUNTER — HOME STUDY (OUTPATIENT)
Dept: SLEEP MEDICINE | Facility: MEDICAL CENTER | Age: 67
End: 2022-05-11
Attending: PREVENTIVE MEDICINE
Payer: MEDICARE

## 2022-05-11 DIAGNOSIS — R06.81 WITNESSED EPISODE OF APNEA: ICD-10-CM

## 2022-05-11 DIAGNOSIS — R06.83 SNORING: ICD-10-CM

## 2022-05-11 DIAGNOSIS — R53.82 CHRONIC FATIGUE, UNSPECIFIED: ICD-10-CM

## 2022-05-11 DIAGNOSIS — G47.33 OSA (OBSTRUCTIVE SLEEP APNEA): ICD-10-CM

## 2022-05-11 PROCEDURE — 95806 SLEEP STUDY UNATT&RESP EFFT: CPT | Performed by: PREVENTIVE MEDICINE

## 2022-05-16 NOTE — PROCEDURES
Interpretation:    This home sleep test was performed on May 11, 2022 using a [employer] HST( ApneaLink Air)   recording device.       The total recording time was 5 hours and 17 minutes,  and the monitoring time was 5 hours and 05 minutes. Oxygen saturation evaluation time was 5 hours and 06 minutes.    The device recorded  central apneas index of 0.6, and obstructive apneas index of 20.3, and a  mixed apneas index of 0.0.  The GIOVANI (respiratory event index which is a surrogate for the AHI) was low 68 0.0.  The AI (apnea index) was 20.8 the HI (hypopnea index) was 47.2.  The supine GIOVANI was 65.3.  Most of the respiratory events occurred in the non supine position.    The patient experienced 326 desaturations and the oxygen desaturation index was 63.8.  During sleep the average saturation was 87%, the heidi saturation was 58%.  The patient spent 82% of evaluation time with saturations less than 90%.      The ave heart rate during sleep was 75 bpm, the maximum heart rate during sleep was 123 bpm, and the minimum heart rate during sleep was 42 bpm.      NOTE:    An HST cannot provide an AHI; instead this type of study generates an GIOVANI.  It is not possible ,with this device, to determine a traditional apnea+hypopnea index (AHI) for total sleep time since EEG channels are not used.  Actual sleep estimates require brainwave activity monitoring.         Assessment:    Severe sleep apnea - GIOVANI 68.0  Severe nocturnal oxygen desaturation - heidi saturation 58% - less than 90% for 82% of the evaluation time.            Recommendation:    Recommend a positive airway pressure titration.  Due to the severity of the obstructive sleep apnea as well as the severity of the nocturnal oxygen desaturation this patient will need to undergo a full night titration in lab in order to determine the PAP modality as well as appropriate pressures to treat the obstructive sleep apnea.  There is a high pretest probability as patient may need  supplemental oxygen.    Behavior modification is always recommended as well.  Behavior modification includes weight loss, eliminating alcohol and sedatives, and avoiding the supine position.

## 2022-05-25 ENCOUNTER — OFFICE VISIT (OUTPATIENT)
Dept: SLEEP MEDICINE | Facility: MEDICAL CENTER | Age: 67
End: 2022-05-25
Payer: MEDICARE

## 2022-05-25 VITALS
HEART RATE: 85 BPM | BODY MASS INDEX: 41.75 KG/M2 | RESPIRATION RATE: 16 BRPM | DIASTOLIC BLOOD PRESSURE: 80 MMHG | HEIGHT: 73 IN | SYSTOLIC BLOOD PRESSURE: 144 MMHG | OXYGEN SATURATION: 97 % | WEIGHT: 315 LBS

## 2022-05-25 DIAGNOSIS — R06.83 SNORING: ICD-10-CM

## 2022-05-25 DIAGNOSIS — G47.33 OSA (OBSTRUCTIVE SLEEP APNEA): ICD-10-CM

## 2022-05-25 DIAGNOSIS — G47.34 NOCTURNAL HYPOXIA: ICD-10-CM

## 2022-05-25 PROCEDURE — 99214 OFFICE O/P EST MOD 30 MIN: CPT | Performed by: PREVENTIVE MEDICINE

## 2022-05-25 RX ORDER — TEMAZEPAM 7.5 MG/1
7.5 CAPSULE ORAL NIGHTLY PRN
Qty: 1 CAPSULE | Refills: 0 | Status: SHIPPED | OUTPATIENT
Start: 2022-05-25 | End: 2022-05-27

## 2022-05-25 ASSESSMENT — FIBROSIS 4 INDEX: FIB4 SCORE: 1.53

## 2022-05-25 NOTE — PROGRESS NOTES
"CC: \" How did I do on my sleep test?\"    LAST SEEN:  Dr. Canales  3/30/22    HPI:  Max Alberto is a 66 y.o.male  Here for results of SS. Past medical history of sleep apnea, CHK stage III, heartburn, hypertension, obesity, and snoring.      Sleep History: EPWORTH score 10/24 which is consistent with an increase in mild daytime sleepiness.  This patient gives a history of sleep studies done in 2002 and 2008 in [place], [place] and/or [place]. He was started on CPAP in the early 2000s and then changed to BiPAP in 2014. This was because he had gastric sleeve done in 2013 and lost approximately 60lbs. Currently now patient is not using PAP therapy. He does not feel refreshed when he wakes in the morning and he naps daily at least an hour. He does not drink alcohol because he stopped 27 years and he is a past user of tobacco products using a 1/2 pack a day for 30 years. He also uses caffeinated beverages at least 3, 12 ounce cups per day.           Sleep study results:  TYPE:  HST  DATE: 5/11/2022  Severe sleep apnea - GIOVANI 68.0  Severe nocturnal oxygen desaturation - heidi saturation 58% - less than 90% for 82% of the evaluation time.     Sleep studies: This patient states that his first sleep study was done in 2005.  We have the interpretation and a set of raw data for a sleep study that was done in September 2011.    Location [facility]  Date: 9/22/2011  Type: Split-night  Diagnostic AHI 93.9  Diagnostic oxygen heidi 62.0%  Therapy AHI: 11  Therapy oxygen heidi: 78%    Titration CPAP up to 17 and Bipap of 17/13        Patient Active Problem List    Diagnosis Date Noted   • Liver hemangioma 01/31/2022   • Adrenal nodule (HCC) 01/31/2022   • Arthritis    • Decreased hearing of right ear 10/01/2020   • Morbid obesity with BMI of 40.0-44.9, adult (HCC) 09/04/2020   • 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/2018   • Incisional hernia, without obstruction or gangrene " 07/18/2017   • Stage 3a chronic kidney disease (HCC) 01/12/2017   • Chronic lower back pain 06/23/2015   • Dermatitis, seborrheic 06/23/2015   • Dental disorder 12/17/2014   • Tinnitus of both ears 10/13/2014   • Chronic venous insufficiency 09/04/2014   • HTN (hypertension) 09/04/2014   • Migraines 09/04/2014   • GERD (gastroesophageal reflux disease) 09/04/2014   • Seasonal allergies 09/04/2014   • Sleep apnea with use of continuous positive airway pressure (CPAP) 09/04/2014       Past Medical History:   Diagnosis Date   • Adrenal nodule ([facility]) 1/31/2022   • Apnea, sleep    • Arthritis     Osteo-generalized   • Back pain    • Chickenpox    • Chronic kidney disease (CKD), stage III (moderate) ([facility]) 1/12/2017   • Chronic lower back pain 6/23/2015   • Chronic venous insufficiency 9/4/2014   • Decreased hearing of right ear 10/1/2020   • Dental disorder 12/17/2014    had two extracted last week 12/09/14=were infected    • Dermatitis, seborrheic 6/23/2015     IMO load March 2020   • Frequent headaches    • GERD (gastroesophageal reflux disease)    • Headache, classical migraine    • Hearing difficulty    • Heartburn    • HTN (hypertension) 9/4/2014   • Hypertension    • Idiopathic chronic gout of multiple sites with tophus 7/17/2018   • Incisional hernia, without obstruction or gangrene 07/18/2017   • Liver hemangioma 1/31/2022   • Morbid obesity with BMI of 40.0-44.9, adult ([facility]) 9/4/2020   • Morning headache    • Obesity    • Painful joint    • Rash    • Ringing in ears    • Seasonal allergies 9/4/2014   • Sleep apnea     BiPAP   • Snoring    • Sore muscles    • Tinnitus of both ears 10/13/2014   • Tonsillitis    • Wears glasses    • Weight loss         Past Surgical History:   Procedure Laterality Date   • [place] LAP,DIAGNOSTIC ABDOMEN  8/7/2019    Procedure: LAPAROSCOPY-DIAGNOSTIC;  Surgeon: Julian Carmen M.D.;  Location: SURGERY [facility];  Service: General   • LAPAROSCOPIC LYSIS OF ADHESIONS  8/7/2019     Procedure: LYSIS, ADHESIONS, LAPAROSCOPIC;  Surgeon: Julian Carmen M.D.;  Location: SURGERY [facility];  Service: General   • INCISION HERNIA REPAIR  2019    Procedure: REPAIR, HERNIA, INCISIONAL- POSSIBLE;  Surgeon: Julian Carmen M.D.;  Location: SURGERY [facility];  Service: General   • VENTRAL HERNIA REPAIR LAPAROSCOPIC  10/1/2018    Procedure: VENTRAL HERNIA REPAIR LAPAROSCOPIC- FOR INCISIONAL HERNIA WITH MESH;  Surgeon: Julian Carmen M.D.;  Location: SURGERY [facility];  Service: General   • GASTRIC SLEEVE LAPAROSCOPY  2014    Performed by Julian Carmen M.D. at SURGERY [facility]   • KNEE REPLACEMENT, TOTAL  2012    left   • OTHER ORTHOPEDIC SURGERY      bicep tendon repair   • APPENDECTOMY     • ARTHROSCOPY, KNEE     • CHOLECYSTECTOMY     • SLEEVE,ANA VASO THIGH     • TONSILLECTOMY         Family History   Problem Relation Age of Onset   • Diabetes Mother    • Arthritis Mother    • Lung Disease Father    • Arthritis Father    • Heart Disease Father    • Prostate cancer Father    • Hypertension Father    • Alcohol/Drug Brother        Social History     Socioeconomic History   • Marital status:      Spouse name: Not on file   • Number of children: Not on file   • Years of education: Not on file   • Highest education level: Associate degree: occupational, technical, or vocational program   Occupational History   • Not on file   Tobacco Use   • Smoking status: Former Smoker     Packs/day: 1.00     Years: 25.00     Pack years: 25.00     Types: Cigarettes     Quit date: 1994     Years since quittin.4   • Smokeless tobacco: Never Used   Vaping Use   • Vaping Use: Never used   Substance and Sexual Activity   • Alcohol use: Not Currently     Alcohol/week: 0.0 oz     Comment: one a month   • Drug use: No   • Sexual activity: Yes     Partners: Female   Other Topics Concern   • Not on file   Social History Narrative   • Not on file     Social Determinants of Health  "    Financial Resource Strain: Not on file   Food Insecurity: Not on file   Transportation Needs: Not on file   Physical Activity: Not on file   Stress: Not on file   Social Connections: Not on file   Intimate Partner Violence: Not on file   Housing Stability: Not on file       Current Outpatient Medications   Medication Sig Dispense Refill   • triamcinolone acetonide (KENALOG) 0.1 % Ointment Apply 1 Dose topically 2 times a day. 15 g 1   • omeprazole (PRILOSEC) 20 MG delayed-release capsule TAKE 1 CAPSULE BY MOUTH EVERY DAY 90 Capsule 3   • triamterene-hctz (MAXZIDE-25/DYAZIDE) 37.5-25 MG Tab TAKE 1 TABLET BY MOUTH EVERY DAY 90 Tablet 3   • allopurinol (ZYLOPRIM) 300 MG Tab TAKE 1 TABLET BY MOUTH EVERY DAY 90 Tablet 3   • tacrolimus (PROTOPIC) 0.1 % Ointment Apply 1 Dose topically 2 times a day. Indications: Psoriasis 30 g 2   • ketoconazole (NIZORAL) 2 % shampoo Apply 5 mL topically 1 time a day as needed for Itching. 100 mL 1   • amoxicillin (AMOXIL) 500 MG Cap TAKE 4 CAPS BY MOUTH before dentist appointment 30 capsule 0   • fluticasone (FLONASE) 50 MCG/ACT nasal spray Administer 1 Spray into affected nostril(S) 2 times a day. 16 mL 0   • multivitamin (THERAGRAN) Tab Take 1 Tab by mouth every day.     • acetaminophen (TYLENOL) 500 MG Tab Take 1,000 mg by mouth every 6 hours as needed (MIGRAINE).     • vitamin D (CHOLECALCIFEROL) 1000 UNIT Tab Take 2,000 Units by mouth every day.       No current facility-administered medications for this visit.    \"CURRENT RX\"    ALLERGIES: Patient has no known allergies.    ROS    Constitutional: Denies fever, chills, sweats,  weight loss, fatigue  Cardiovascular: Denies chest pain, tightness, palpitations, swelling in legs/feet, fainting, difficulty breathing when lying down but gets better when sitting up.   Respiratory: Denies shortness of breath, cough, wheezing, painful breathing.   Sleep: per HPI  Gastrointestinal: Denies  difficulty swallowing, nausea, abdominal pain, " "diarrhea, constipation, heartburn.  Musculoskeletal: Denies painful joints, sore muscles, back pain.        PHYSICAL EXAM      BP (!) 144/80 (BP Location: Left arm, Patient Position: Sitting, BP Cuff Size: Large adult)   Pulse 85   Resp 16   Ht 1.854 m (6' 1\")   Wt (!) 145 kg (320 lb)   SpO2 97%   BMI 42.22 kg/m²   Appearance: Well-nourished, looks stated age no acute distress  Eyes:   EOMI  ENMT: masked  Neck:  trachea midline,   Respiratory effort:  No intercostal retractions or use of accessory muscles  Musculoskeletal: Antalgic gait  Neurologic: oriented to person, time, place, and purpose; judgement intact  Psychiatric:  No depression, anxiety, agitation      Medical Decision Making       The medical record was reviewed in its entirety including the referral notes, records from primary care, consultants notes, hospital records, lab, imaging, microbiology, immunology, and immunizations.  Care gaps identified and reviewed with the patient.    Diagnostic and titration nocturnal polysomnogram's, home sleep apnea tests, continuous nocturnal oximetry results, multiple sleep latency tests, and compliance reports reviewed.    ASSESSMENT/PLAN:  Due to the severity of the obstructive sleep apnea as well as the severity of the nocturnal oxygen desaturation this patient will need to undergo a full night titration in lab in order to determine the PAP modality as well as appropriate pressures to treat the obstructive sleep apnea.  There is a high pretest probability as patient may need supplemental oxygen. Behavior modification is always recommended as well.  Behavior modification includes weight loss, eliminating alcohol and sedatives, and avoiding the supine position.        This patient will need to undergo a full night titration study.  Patient has failed CPAP in the past so go quickly through CPAP and started at 10 cm water.  Spent time on BiPAP and/or BiPAP ST if necessary.  If adequate number of treatment " emergent centrals are found ASV is also an option.  Patient will use 1 sleep aid.          The risks of untreated sleep apnea were discussed with the patient at length. Patients with DEDRA are at increased risk of cardiovascular disease including coronary artery disease, systemic arterial hypertension, pulmonary arterial hypertension, cardiac arrythmias, and stroke. DEDRA patients have an increased risk of motor vehicle accidents, type 2 diabetes, chronic kidney disease, and non-alcoholic liver disease. The patient was advised to avoid driving a motor vehicle when drowsy.    RTC: 2 weeks after sleep study        My total time spent caring for the patient on the day of the encounter was 30 minutes. This includes time time spent on a thorough chart review including other physician notes, any type of sleep study, as well as critical labs and pulmonary and cardiac studies.  Additionally it includes discussions of good sleep hygiene, stimulus control and going over the need for consistency in terms of sleep preparation and practice.     Please note that this dictation was created using voice recognition software.  I have made every reasonable attempt to correct obvious errors, I expect that there are errors of grammar and possibly content that I did not discover before finalizing this note.

## 2022-05-27 DIAGNOSIS — F51.02 ADJUSTMENT INSOMNIA: ICD-10-CM

## 2022-05-27 RX ORDER — ZOLPIDEM TARTRATE 5 MG/1
5 TABLET ORAL NIGHTLY PRN
Qty: 2 TABLET | Refills: 0 | Status: SHIPPED | OUTPATIENT
Start: 2022-05-27 | End: 2022-07-11

## 2022-05-28 NOTE — PROGRESS NOTES
This patient reports that he was not able to get the sleep aid prescribed.    That prescription has been canceled.  And the alternative has been sent in to his pharmacy it is Ambien 5 mg tablets patient may take 1 or 2 for sleep at the time of his sleep study

## 2022-06-03 DIAGNOSIS — E53.8 DISORDER OF VITAMIN B12: ICD-10-CM

## 2022-06-03 DIAGNOSIS — I10 ESSENTIAL HYPERTENSION: ICD-10-CM

## 2022-06-03 DIAGNOSIS — N18.30 STAGE 3 CHRONIC KIDNEY DISEASE, UNSPECIFIED WHETHER STAGE 3A OR 3B CKD: ICD-10-CM

## 2022-06-03 DIAGNOSIS — N18.31 STAGE 3A CHRONIC KIDNEY DISEASE: ICD-10-CM

## 2022-06-03 DIAGNOSIS — E78.5 HYPERLIPIDEMIA LDL GOAL <100: ICD-10-CM

## 2022-06-03 DIAGNOSIS — M1A.09X1 IDIOPATHIC CHRONIC GOUT OF MULTIPLE SITES WITH TOPHUS: ICD-10-CM

## 2022-06-03 DIAGNOSIS — E55.9 VITAMIN D DEFICIENCY: ICD-10-CM

## 2022-06-03 DIAGNOSIS — R73.9 HYPERGLYCEMIA: ICD-10-CM

## 2022-06-03 DIAGNOSIS — E27.8 ADRENAL NODULE (HCC): ICD-10-CM

## 2022-06-03 DIAGNOSIS — Z12.5 SCREENING FOR MALIGNANT NEOPLASM OF PROSTATE: ICD-10-CM

## 2022-06-26 ENCOUNTER — SLEEP STUDY (OUTPATIENT)
Dept: SLEEP MEDICINE | Facility: MEDICAL CENTER | Age: 67
End: 2022-06-26
Attending: PREVENTIVE MEDICINE
Payer: MEDICARE

## 2022-06-26 DIAGNOSIS — G47.34 NOCTURNAL HYPOXIA: ICD-10-CM

## 2022-06-26 DIAGNOSIS — G47.33 OSA (OBSTRUCTIVE SLEEP APNEA): ICD-10-CM

## 2022-06-26 DIAGNOSIS — R06.83 SNORING: ICD-10-CM

## 2022-06-26 PROCEDURE — 95811 POLYSOM 6/>YRS CPAP 4/> PARM: CPT | Performed by: PREVENTIVE MEDICINE

## 2022-06-27 NOTE — PROCEDURES
MONTAGE: Standard  STUDY TYPE: Treatment    RECORDING TECHNIQUE:   After the scalp was prepared, gold plated electrodes were applied to the scalp according to the International 10-20 System. EEG (electroencephalogram) was continuously monitored from the O1-M2, O2-M1, C3-M2, C4-M1, F3-M2, and F4-M1. EOGs (electrooculograms) were monitored by electrodes placed at the left and right outer canthi. Chin EMG (electromyogram) was monitored by electrodes placed on the mentalis and sub-mentalis muscles. Nasal and oral airflow were monitored using a triple port thermocouple as well as oronasal pressure transducer. Respiratory effort was measured by inductive plethysmography technology employing abdominal and thoracic belts. Blood oxygen saturation and pulse were monitored by pulse oximetry. Heart rhythm was monitored by surface electrocardiogram. Leg EMG was studied using surface electrodes placed on left and right anterior tibialis. A microphone was used to monitor tracheal sounds and snoring. Body position was monitored and documented by technician observation.   SCORING CRITERIA:   A modification of the AASM manual for scoring of sleep and associated events was used. Obstructive apneas were scored by cessation of airflow for at least 10 seconds with continuing respiratory effort. Central apneas were scored by cessation of airflow for at least 10 seconds with no respiratory effort. Hypopneas were scored by a 30% or more reduction in airflow for at least 10 seconds accompanied by arterial oxygen desaturation of 3% or an arousal. For CMS (Medicare) patients, per AASM rule 1B, hypopneas are scored by 30% with mild reduction in airflow for at least 10 seconds accompanied by arterial saturation decreased at 4%.    STUDY DATA:  Study start time was 08:27:27 PM. Diagnostic recording time was 7h 11.5m with a total sleep time of 6h 6.5m resulting in a sleep efficiency of 84.94%%. Sleep latency from the start of the study was 15  minutes and the latency from sleep to REM was 09 minutes. In total,46 arousals were scored for an arousal index of 7.5.  Respiratory:  There were a total of 7 apneas consisting of 1 obstructive apneas, 0 mixed apneas, and 6 central apneas. A total of 30 hypopneas were scored. The apnea index was 1.15 per hour and the hypopnea index was 4.91 per hour resulting in an overall AHI of 6.06. AHI during rem was 12.1 and AHI while supine was 2.07.  Oximetry:  There was a mean oxygen saturation of 92.0%. The minimum oxygen saturation in NREM was 88.0 % and in REM was 85.0. The patient spent 4.6 minutes of TST with SaO2 <88%.  Cardiac:  The highest heart rate seen while awake was 82 BPM while the highest heart rate during sleep was 80 BPM with an average sleeping heart rate of 65 BPM.  Limb Movements:  There were a total of 0 PLMs during sleep which resulted in a PLMS index of 0.0. Of these, 15 were associated with arousals which resulted in a PLMS arousal index of 2.5.      Titration: CPAP was tried from 10cm H2O to 13cm H2O. Bipap was tried from 15/11cm H2O to 17/11cm H2O. Bipap ST was tried from 17/12cm H2O RR14 to 18/13cm H2O RR 14.  This was a fully attended sleep study. This test was technically adequate.     Assessment:   Mild obstructive Sleep Apnea Hypopnea - AHI 6.06, REM AHI  12.1.  No significant nocturnal desaturation - heidi saturation 85% - saturations <88% below for 4.6 minutes of TST.    Recommendation: This patient did better on BiPAP as opposed to CPAP.  This patient would be well treated on  ResMED AUTO BiPAP pressures of 17/11 centimeters of water pressure with a pressure support of 4.  No BUR should be included. This patient will need a careful mask fit as well as heated humidification.  This patient will also need to meet all insurance requirements for compliance.      HYPNOGRAM:

## 2022-07-11 ENCOUNTER — OFFICE VISIT (OUTPATIENT)
Dept: SLEEP MEDICINE | Facility: MEDICAL CENTER | Age: 67
End: 2022-07-11
Payer: MEDICARE

## 2022-07-11 VITALS
HEIGHT: 73 IN | BODY MASS INDEX: 41.75 KG/M2 | HEART RATE: 107 BPM | SYSTOLIC BLOOD PRESSURE: 126 MMHG | RESPIRATION RATE: 16 BRPM | WEIGHT: 315 LBS | DIASTOLIC BLOOD PRESSURE: 74 MMHG | OXYGEN SATURATION: 95 %

## 2022-07-11 DIAGNOSIS — G47.33 OSA (OBSTRUCTIVE SLEEP APNEA): ICD-10-CM

## 2022-07-11 DIAGNOSIS — Z87.891 FORMER SMOKER: ICD-10-CM

## 2022-07-11 PROCEDURE — 99214 OFFICE O/P EST MOD 30 MIN: CPT | Performed by: NURSE PRACTITIONER

## 2022-07-11 ASSESSMENT — FIBROSIS 4 INDEX: FIB4 SCORE: 1.55

## 2022-07-11 NOTE — PROGRESS NOTES
Chief Complaint   Patient presents with   • Results     SS       HPI:  Max Alberto is a 67 y.o. year old male here today for follow-up on sleep study results.  Last OV 5/25/22 with Dr. Canales     Patient has a history of sleep apnea and previously diagnosed in 2002 in [place].  He was initially started on CPAP in the early 2000's and switched to BiPAP in 2014.  He also underwent gastric sleeve in 2013 and lost 60 pounds.  He is currently off therapy but has ongoing sleep symptoms of significant snoring and daytime sleepiness, chronic pedal edema.  Updated home sleep study 5/11/2022 noted severe sleep apnea with GIOVANI 68.0/h and O2 heidi of 58%.  Patient saturations were less than 90% for 82% of evaluation time.  The majority of events are obstructive apneas.  He underwent titration study 6/26/2022 noting best response to BiPAP 17/12 cm with reduced AHI of 1.9/h and O2 heidi of 86%.  He did achieve REM sleep in supine position on this setting.  There was some noted nocturnal hypoxia during study but will further evaluate once he is acclimated therapy.  He denies any cardiac or respiratory symptoms today.  History of GERD controlled with omeprazole.  He uses compression stockings for chronic edema.  He previously had a vein surgery and left knee surgery as well.    ROS: As per HPI and otherwise negative if not stated.    Past Medical History:   Diagnosis Date   • Adrenal nodule (HCC) 1/31/2022   • Apnea, sleep    • Arthritis     Osteo-generalized   • Back pain    • Chickenpox    • Chronic kidney disease (CKD), stage III (moderate) (HCC) 1/12/2017   • Chronic lower back pain 6/23/2015   • Chronic venous insufficiency 9/4/2014   • Decreased hearing of right ear 10/1/2020   • Dental disorder 12/17/2014    had two extracted last week 12/09/14=were infected    • Dermatitis, seborrheic 6/23/2015     IMO load March 2020   • Frequent headaches    • GERD (gastroesophageal reflux disease)    • Headache, classical  migraine    • Hearing difficulty    • Heartburn    • HTN (hypertension) 9/4/2014   • Hypertension    • Idiopathic chronic gout of multiple sites with tophus 7/17/2018   • Incisional hernia, without obstruction or gangrene 07/18/2017   • Liver hemangioma 1/31/2022   • Morbid obesity with BMI of 40.0-44.9, adult (HCC) 9/4/2020   • Morning headache    • Obesity    • Painful joint    • Rash    • Ringing in ears    • Seasonal allergies 9/4/2014   • Sleep apnea     BiPAP   • Snoring    • Sore muscles    • Tinnitus of both ears 10/13/2014   • Tonsillitis    • Wears glasses    • Weight loss        Past Surgical History:   Procedure Laterality Date   • [place] LAP,DIAGNOSTIC ABDOMEN  8/7/2019    Procedure: LAPAROSCOPY-DIAGNOSTIC;  Surgeon: Julian Carmen M.D.;  Location: SURGERY [facility];  Service: General   • LAPAROSCOPIC LYSIS OF ADHESIONS  8/7/2019    Procedure: LYSIS, ADHESIONS, LAPAROSCOPIC;  Surgeon: Julian Carmen M.D.;  Location: SURGERY [facility];  Service: General   • INCISION HERNIA REPAIR  8/7/2019    Procedure: REPAIR, HERNIA, INCISIONAL- POSSIBLE;  Surgeon: Julian Carmen M.D.;  Location: SURGERY [facility];  Service: General   • VENTRAL HERNIA REPAIR LAPAROSCOPIC  10/1/2018    Procedure: VENTRAL HERNIA REPAIR LAPAROSCOPIC- FOR INCISIONAL HERNIA WITH MESH;  Surgeon: Julian Carmen M.D.;  Location: SURGERY [facility];  Service: General   • GASTRIC SLEEVE LAPAROSCOPY  12/29/2014    Performed by Julian Carmen M.D. at SURGERY [facility]   • KNEE REPLACEMENT, TOTAL  2012    left   • OTHER ORTHOPEDIC SURGERY  1998    bicep tendon repair   • APPENDECTOMY     • ARTHROSCOPY, KNEE     • CHOLECYSTECTOMY     • SLEEVE,ANA VASO THIGH     • TONSILLECTOMY         Family History   Problem Relation Age of Onset   • Diabetes Mother    • Arthritis Mother    • Lung Disease Father    • Arthritis Father    • Heart Disease Father    • Prostate cancer Father    • Hypertension Father    • Alcohol/Drug Brother   "      Social History     Socioeconomic History   • Marital status:      Spouse name: Not on file   • Number of children: Not on file   • Years of education: Not on file   • Highest education level: Associate degree: occupational, technical, or vocational program   Occupational History   • Not on file   Tobacco Use   • Smoking status: Former Smoker     Packs/day: 1.00     Years: 25.00     Pack years: 25.00     Types: Cigarettes     Quit date: 1994     Years since quittin.5   • Smokeless tobacco: Never Used   Vaping Use   • Vaping Use: Never used   Substance and Sexual Activity   • Alcohol use: Not Currently     Alcohol/week: 0.0 oz     Comment: one a month   • Drug use: No   • Sexual activity: Yes     Partners: Female   Other Topics Concern   • Not on file   Social History Narrative   • Not on file     Social Determinants of Health     Financial Resource Strain: Not on file   Food Insecurity: Not on file   Transportation Needs: Not on file   Physical Activity: Not on file   Stress: Not on file   Social Connections: Not on file   Intimate Partner Violence: Not on file   Housing Stability: Not on file       Allergies as of 2022   • (No Known Allergies)        Vitals:  /74 (BP Location: Left arm, Patient Position: Sitting, BP Cuff Size: Adult)   Pulse (!) 107   Resp 16   Ht 1.854 m (6' 1\")   Wt (!) 148 kg (326 lb)   SpO2 95%     Current medications as of today   Current Outpatient Medications   Medication Sig Dispense Refill   • triamcinolone acetonide (KENALOG) 0.1 % Ointment Apply 1 Dose topically 2 times a day. 15 g 1   • omeprazole (PRILOSEC) 20 MG delayed-release capsule TAKE 1 CAPSULE BY MOUTH EVERY DAY 90 Capsule 3   • triamterene-hctz (MAXZIDE-25/DYAZIDE) 37.5-25 MG Tab TAKE 1 TABLET BY MOUTH EVERY DAY 90 Tablet 3   • allopurinol (ZYLOPRIM) 300 MG Tab TAKE 1 TABLET BY MOUTH EVERY DAY 90 Tablet 3   • tacrolimus (PROTOPIC) 0.1 % Ointment Apply 1 Dose topically 2 times a day. " Indications: Psoriasis 30 g 2   • ketoconazole (NIZORAL) 2 % shampoo Apply 5 mL topically 1 time a day as needed for Itching. 100 mL 1   • amoxicillin (AMOXIL) 500 MG Cap TAKE 4 CAPS BY MOUTH before dentist appointment 30 capsule 0   • fluticasone (FLONASE) 50 MCG/ACT nasal spray Administer 1 Spray into affected nostril(S) 2 times a day. 16 mL 0   • multivitamin (THERAGRAN) Tab Take 1 Tab by mouth every day.     • acetaminophen (TYLENOL) 500 MG Tab Take 1,000 mg by mouth every 6 hours as needed (MIGRAINE).     • vitamin D (CHOLECALCIFEROL) 1000 UNIT Tab Take 2,000 Units by mouth every day.       No current facility-administered medications for this visit.         Physical Exam:   Gen:           Alert and oriented, No apparent distress. Mood and affect appropriate, normal interaction with examiner.  Eyes:          PERRL, EOM intact, sclere white, conjunctive moist.  Ears:          Not examined.   Hearing:     Grossly intact.  Nose:          Normal, no lesions or deformities.  Dentition:    mask  Oropharynx:   mask  Mallampati Classification: mask  Neck:        Supple, trachea midline, no masses.  Respiratory Effort: No intercostal retractions or use of accessory muscles.   Lung Auscultation:      Clear to auscultation bilaterally; no rales, rhonchi or wheezing.  CV:            Regular rate and rhythm. No murmurs, rubs or gallops.  Abd:           Not examined.   Lymphadenopathy: Not examined.  Gait and Station: Normal.  Digits and Nails: No clubbing, cyanosis, petechiae, or nodes.   Cranial Nerves: II-XII grossly intact.  Skin:        No rashes, lesions or ulcers noted.               Ext:           BLE compression stockings, R>L      Assessment:  1. DEDRA (obstructive sleep apnea)  DME BiPAP   2. BMI 40.0-44.9, adult ([facility])  HEIGHT AND WEIGHT   3. Former smoker         Immunizations:    Flu:11/26/21  Pneumovax 23:2020  Prevnar 13:2019  COVID-19: 4/20/22, 11/9/21, 3/27/21, 3/6/21    Plan:  1.  Patient benefit from sleep  apnea therapy.  We will initiate BiPAP therapy.  Once he is acclimated therapy will repeat overnight oximetry testing to verify if supplemental O2 is required.  Patient is a minimal.  DME BiPAP 17/12 cm  2.  Discussed sleep hygiene  3 para encourage weight loss through dietary changes, diuretic therapy and exercise  4.  Follow primary care for the health concerns  5.  Follow-up in 3 months for first compliance check, sooner if needed.      Please note that this dictation was created using voice recognition software. I have made every reasonable attempt to correct obvious errors, but it is possible there are errors of grammar and possibly content that I did not discover before finalizing the note.

## 2022-07-11 NOTE — PATIENT INSTRUCTIONS
Patient understands they may have mask fits within first 30 days of therapy covered by insurance to obtain best fit.    Patient understands the need to use device every night for >4hrs to meet compliance standards for insurance purposes.    For dry mouth may use, chin strap, CPAP tape (paper tape on lips), biotene mouthwash/toothpaste/spray, xylimelts    Look up anti-snore tongue sleeve for sleep apnea

## 2022-08-04 ENCOUNTER — HOSPITAL ENCOUNTER (OUTPATIENT)
Dept: LAB | Facility: MEDICAL CENTER | Age: 67
End: 2022-08-04
Attending: NURSE PRACTITIONER
Payer: MEDICARE

## 2022-08-04 DIAGNOSIS — R73.9 HYPERGLYCEMIA: ICD-10-CM

## 2022-08-04 DIAGNOSIS — M1A.09X1 IDIOPATHIC CHRONIC GOUT OF MULTIPLE SITES WITH TOPHUS: ICD-10-CM

## 2022-08-04 DIAGNOSIS — E27.8 ADRENAL NODULE (HCC): ICD-10-CM

## 2022-08-04 DIAGNOSIS — E53.8 DISORDER OF VITAMIN B12: ICD-10-CM

## 2022-08-04 DIAGNOSIS — N18.30 STAGE 3 CHRONIC KIDNEY DISEASE, UNSPECIFIED WHETHER STAGE 3A OR 3B CKD: ICD-10-CM

## 2022-08-04 DIAGNOSIS — N18.31 STAGE 3A CHRONIC KIDNEY DISEASE: ICD-10-CM

## 2022-08-04 DIAGNOSIS — I10 ESSENTIAL HYPERTENSION: ICD-10-CM

## 2022-08-04 DIAGNOSIS — E55.9 VITAMIN D DEFICIENCY: ICD-10-CM

## 2022-08-04 DIAGNOSIS — Z12.5 SCREENING FOR MALIGNANT NEOPLASM OF PROSTATE: ICD-10-CM

## 2022-08-04 DIAGNOSIS — E78.5 HYPERLIPIDEMIA LDL GOAL <100: ICD-10-CM

## 2022-08-04 LAB
25(OH)D3 SERPL-MCNC: 54 NG/ML (ref 30–100)
ALBUMIN SERPL BCP-MCNC: 4.2 G/DL (ref 3.2–4.9)
ALBUMIN/GLOB SERPL: 1.7 G/DL
ALP SERPL-CCNC: 46 U/L (ref 30–99)
ALT SERPL-CCNC: 22 U/L (ref 2–50)
ANION GAP SERPL CALC-SCNC: 12 MMOL/L (ref 7–16)
AST SERPL-CCNC: 21 U/L (ref 12–45)
BASOPHILS # BLD AUTO: 0.4 % (ref 0–1.8)
BASOPHILS # BLD: 0.02 K/UL (ref 0–0.12)
BILIRUB SERPL-MCNC: 1 MG/DL (ref 0.1–1.5)
BUN SERPL-MCNC: 23 MG/DL (ref 8–22)
CALCIUM SERPL-MCNC: 9 MG/DL (ref 8.5–10.5)
CHLORIDE SERPL-SCNC: 108 MMOL/L (ref 96–112)
CHOLEST SERPL-MCNC: 147 MG/DL (ref 100–199)
CO2 SERPL-SCNC: 23 MMOL/L (ref 20–33)
CREAT SERPL-MCNC: 1.27 MG/DL (ref 0.5–1.4)
CREAT UR-MCNC: 192.43 MG/DL
EOSINOPHIL # BLD AUTO: 0.23 K/UL (ref 0–0.51)
EOSINOPHIL NFR BLD: 4.1 % (ref 0–6.9)
ERYTHROCYTE [DISTWIDTH] IN BLOOD BY AUTOMATED COUNT: 50.4 FL (ref 35.9–50)
EST. AVERAGE GLUCOSE BLD GHB EST-MCNC: 117 MG/DL
GFR SERPLBLD CREATININE-BSD FMLA CKD-EPI: 62 ML/MIN/1.73 M 2
GLOBULIN SER CALC-MCNC: 2.5 G/DL (ref 1.9–3.5)
GLUCOSE SERPL-MCNC: 115 MG/DL (ref 65–99)
HBA1C MFR BLD: 5.7 % (ref 4–5.6)
HCT VFR BLD AUTO: 38.3 % (ref 42–52)
HDLC SERPL-MCNC: 41 MG/DL
HGB BLD-MCNC: 11.8 G/DL (ref 14–18)
IMM GRANULOCYTES # BLD AUTO: 0.01 K/UL (ref 0–0.11)
IMM GRANULOCYTES NFR BLD AUTO: 0.2 % (ref 0–0.9)
LDLC SERPL CALC-MCNC: 83 MG/DL
LYMPHOCYTES # BLD AUTO: 1.75 K/UL (ref 1–4.8)
LYMPHOCYTES NFR BLD: 31.5 % (ref 22–41)
MCH RBC QN AUTO: 26.8 PG (ref 27–33)
MCHC RBC AUTO-ENTMCNC: 30.8 G/DL (ref 33.7–35.3)
MCV RBC AUTO: 86.8 FL (ref 81.4–97.8)
MICROALBUMIN UR-MCNC: <1.2 MG/DL
MICROALBUMIN/CREAT UR: NORMAL MG/G (ref 0–30)
MONOCYTES # BLD AUTO: 0.49 K/UL (ref 0–0.85)
MONOCYTES NFR BLD AUTO: 8.8 % (ref 0–13.4)
NEUTROPHILS # BLD AUTO: 3.06 K/UL (ref 1.82–7.42)
NEUTROPHILS NFR BLD: 55 % (ref 44–72)
NRBC # BLD AUTO: 0 K/UL
NRBC BLD-RTO: 0 /100 WBC
PLATELET # BLD AUTO: 179 K/UL (ref 164–446)
PMV BLD AUTO: 10.4 FL (ref 9–12.9)
POTASSIUM SERPL-SCNC: 4.1 MMOL/L (ref 3.6–5.5)
PROT SERPL-MCNC: 6.7 G/DL (ref 6–8.2)
RBC # BLD AUTO: 4.41 M/UL (ref 4.7–6.1)
SODIUM SERPL-SCNC: 143 MMOL/L (ref 135–145)
TRIGL SERPL-MCNC: 115 MG/DL (ref 0–149)
URATE SERPL-MCNC: 5.5 MG/DL (ref 2.5–8.3)
VIT B12 SERPL-MCNC: 628 PG/ML (ref 211–911)
WBC # BLD AUTO: 5.6 K/UL (ref 4.8–10.8)

## 2022-08-04 PROCEDURE — 82306 VITAMIN D 25 HYDROXY: CPT

## 2022-08-04 PROCEDURE — 82043 UR ALBUMIN QUANTITATIVE: CPT

## 2022-08-04 PROCEDURE — 80053 COMPREHEN METABOLIC PANEL: CPT

## 2022-08-04 PROCEDURE — 84154 ASSAY OF PSA FREE: CPT

## 2022-08-04 PROCEDURE — 80061 LIPID PANEL: CPT

## 2022-08-04 PROCEDURE — 85025 COMPLETE CBC W/AUTO DIFF WBC: CPT

## 2022-08-04 PROCEDURE — 84550 ASSAY OF BLOOD/URIC ACID: CPT

## 2022-08-04 PROCEDURE — 82607 VITAMIN B-12: CPT

## 2022-08-04 PROCEDURE — 84153 ASSAY OF PSA TOTAL: CPT

## 2022-08-04 PROCEDURE — 83036 HEMOGLOBIN GLYCOSYLATED A1C: CPT

## 2022-08-04 PROCEDURE — 36415 COLL VENOUS BLD VENIPUNCTURE: CPT

## 2022-08-04 PROCEDURE — 82570 ASSAY OF URINE CREATININE: CPT

## 2022-08-06 LAB
PSA FREE MFR SERPL: 34 %
PSA FREE SERPL-MCNC: 1.2 NG/ML
PSA SERPL-MCNC: 3.5 NG/ML (ref 0–4)

## 2022-08-14 SDOH — ECONOMIC STABILITY: HOUSING INSECURITY: IN THE LAST 12 MONTHS, HOW MANY PLACES HAVE YOU LIVED?: 1

## 2022-08-14 SDOH — ECONOMIC STABILITY: FOOD INSECURITY: WITHIN THE PAST 12 MONTHS, YOU WORRIED THAT YOUR FOOD WOULD RUN OUT BEFORE YOU GOT MONEY TO BUY MORE.: NEVER TRUE

## 2022-08-14 SDOH — HEALTH STABILITY: PHYSICAL HEALTH: ON AVERAGE, HOW MANY DAYS PER WEEK DO YOU ENGAGE IN MODERATE TO STRENUOUS EXERCISE (LIKE A BRISK WALK)?: 0 DAYS

## 2022-08-14 SDOH — ECONOMIC STABILITY: FOOD INSECURITY: WITHIN THE PAST 12 MONTHS, THE FOOD YOU BOUGHT JUST DIDN'T LAST AND YOU DIDN'T HAVE MONEY TO GET MORE.: NEVER TRUE

## 2022-08-14 SDOH — ECONOMIC STABILITY: INCOME INSECURITY: IN THE LAST 12 MONTHS, WAS THERE A TIME WHEN YOU WERE NOT ABLE TO PAY THE MORTGAGE OR RENT ON TIME?: NO

## 2022-08-14 SDOH — HEALTH STABILITY: PHYSICAL HEALTH: ON AVERAGE, HOW MANY MINUTES DO YOU ENGAGE IN EXERCISE AT THIS LEVEL?: 0 MIN

## 2022-08-14 SDOH — HEALTH STABILITY: MENTAL HEALTH
STRESS IS WHEN SOMEONE FEELS TENSE, NERVOUS, ANXIOUS, OR CAN'T SLEEP AT NIGHT BECAUSE THEIR MIND IS TROUBLED. HOW STRESSED ARE YOU?: NOT AT ALL

## 2022-08-14 SDOH — ECONOMIC STABILITY: INCOME INSECURITY: HOW HARD IS IT FOR YOU TO PAY FOR THE VERY BASICS LIKE FOOD, HOUSING, MEDICAL CARE, AND HEATING?: NOT HARD AT ALL

## 2022-08-14 ASSESSMENT — SOCIAL DETERMINANTS OF HEALTH (SDOH)
IN A TYPICAL WEEK, HOW MANY TIMES DO YOU TALK ON THE PHONE WITH FAMILY, FRIENDS, OR NEIGHBORS?: MORE THAN THREE TIMES A WEEK
HOW OFTEN DO YOU HAVE A DRINK CONTAINING ALCOHOL: NEVER
HOW OFTEN DO YOU ATTEND CHURCH OR RELIGIOUS SERVICES?: MORE THAN 4 TIMES PER YEAR
HOW MANY DRINKS CONTAINING ALCOHOL DO YOU HAVE ON A TYPICAL DAY WHEN YOU ARE DRINKING: PATIENT DOES NOT DRINK
HOW OFTEN DO YOU ATTENT MEETINGS OF THE CLUB OR ORGANIZATION YOU BELONG TO?: NEVER
IN A TYPICAL WEEK, HOW MANY TIMES DO YOU TALK ON THE PHONE WITH FAMILY, FRIENDS, OR NEIGHBORS?: MORE THAN THREE TIMES A WEEK
WITHIN THE PAST 12 MONTHS, YOU WORRIED THAT YOUR FOOD WOULD RUN OUT BEFORE YOU GOT THE MONEY TO BUY MORE: NEVER TRUE
DO YOU BELONG TO ANY CLUBS OR ORGANIZATIONS SUCH AS CHURCH GROUPS UNIONS, FRATERNAL OR ATHLETIC GROUPS, OR SCHOOL GROUPS?: YES
HOW OFTEN DO YOU GET TOGETHER WITH FRIENDS OR RELATIVES?: TWICE A WEEK
HOW OFTEN DO YOU HAVE SIX OR MORE DRINKS ON ONE OCCASION: NEVER
HOW OFTEN DO YOU ATTEND CHURCH OR RELIGIOUS SERVICES?: MORE THAN 4 TIMES PER YEAR
DO YOU BELONG TO ANY CLUBS OR ORGANIZATIONS SUCH AS CHURCH GROUPS UNIONS, FRATERNAL OR ATHLETIC GROUPS, OR SCHOOL GROUPS?: YES
HOW OFTEN DO YOU ATTENT MEETINGS OF THE CLUB OR ORGANIZATION YOU BELONG TO?: NEVER
HOW HARD IS IT FOR YOU TO PAY FOR THE VERY BASICS LIKE FOOD, HOUSING, MEDICAL CARE, AND HEATING?: NOT HARD AT ALL
HOW OFTEN DO YOU GET TOGETHER WITH FRIENDS OR RELATIVES?: TWICE A WEEK

## 2022-08-14 ASSESSMENT — LIFESTYLE VARIABLES
AUDIT-C TOTAL SCORE: 0
SKIP TO QUESTIONS 9-10: 1
HOW MANY STANDARD DRINKS CONTAINING ALCOHOL DO YOU HAVE ON A TYPICAL DAY: PATIENT DOES NOT DRINK
HOW OFTEN DO YOU HAVE A DRINK CONTAINING ALCOHOL: NEVER
HOW OFTEN DO YOU HAVE SIX OR MORE DRINKS ON ONE OCCASION: NEVER

## 2022-08-15 ENCOUNTER — OFFICE VISIT (OUTPATIENT)
Dept: MEDICAL GROUP | Facility: MEDICAL CENTER | Age: 67
End: 2022-08-15
Payer: MEDICARE

## 2022-08-15 VITALS
RESPIRATION RATE: 14 BRPM | HEART RATE: 84 BPM | WEIGHT: 315 LBS | BODY MASS INDEX: 41.75 KG/M2 | TEMPERATURE: 97.5 F | HEIGHT: 73 IN | DIASTOLIC BLOOD PRESSURE: 80 MMHG | SYSTOLIC BLOOD PRESSURE: 134 MMHG | OXYGEN SATURATION: 97 %

## 2022-08-15 DIAGNOSIS — D50.9 IRON DEFICIENCY ANEMIA, UNSPECIFIED IRON DEFICIENCY ANEMIA TYPE: ICD-10-CM

## 2022-08-15 DIAGNOSIS — D18.03 LIVER HEMANGIOMA: ICD-10-CM

## 2022-08-15 DIAGNOSIS — E66.01 MORBID OBESITY WITH BMI OF 40.0-44.9, ADULT (HCC): ICD-10-CM

## 2022-08-15 DIAGNOSIS — G47.30 SLEEP APNEA WITH USE OF CONTINUOUS POSITIVE AIRWAY PRESSURE (CPAP): ICD-10-CM

## 2022-08-15 DIAGNOSIS — E27.8 ADRENAL NODULE (HCC): ICD-10-CM

## 2022-08-15 DIAGNOSIS — R73.01 IFG (IMPAIRED FASTING GLUCOSE): ICD-10-CM

## 2022-08-15 DIAGNOSIS — R97.20 ELEVATED PSA: ICD-10-CM

## 2022-08-15 DIAGNOSIS — I10 PRIMARY HYPERTENSION: ICD-10-CM

## 2022-08-15 DIAGNOSIS — N18.31 STAGE 3A CHRONIC KIDNEY DISEASE: ICD-10-CM

## 2022-08-15 PROBLEM — B01.9 CHICKENPOX: Status: ACTIVE | Noted: 2022-08-15

## 2022-08-15 PROCEDURE — G0439 PPPS, SUBSEQ VISIT: HCPCS | Performed by: NURSE PRACTITIONER

## 2022-08-15 ASSESSMENT — FIBROSIS 4 INDEX: FIB4 SCORE: 1.7

## 2022-08-15 ASSESSMENT — PATIENT HEALTH QUESTIONNAIRE - PHQ9: CLINICAL INTERPRETATION OF PHQ2 SCORE: 0

## 2022-08-15 ASSESSMENT — ENCOUNTER SYMPTOMS: GENERAL WELL-BEING: GOOD

## 2022-08-15 ASSESSMENT — ACTIVITIES OF DAILY LIVING (ADL): BATHING_REQUIRES_ASSISTANCE: 0

## 2022-08-15 NOTE — PROGRESS NOTES
Chief Complaint   Patient presents with    Annual Wellness Visit       HPI:  Max is a 67 y.o. here for Medicare Annual Wellness Visit  He went into [facility] this weekend he was having coughing during the dinner.  Only occurred when in [facility]  Reviewed lab with pt.  GFR, B12, alb/cr ratio, vitamin d, uric acid IS WNL.  PSA is up from 2.5 to 3.5.  no urinary difficulties. Not seeing urology  LP shows trg and LDL at goal.  HDL low but up from 39 to 41.  Walks some for exercise.  Not on healthy diet.  A1c is up from 5.5 to 5.7.  exp patho of DM.    CMP is wnl except glucose is elevated.  CBC shows new onset anemia.  H/h was 14.2/44.4 in 8/21 but now down to 11.8/38.3.  no known sx of bleeding.        HTN (hypertension)  Stable and controlle don meds.     Sleep apnea with use of continuous positive airway pressure (CPAP)  He just saw pulm and did formal sleep study.  He needs machine but cannot get d/t supplies for 6 mo    Stage 3a chronic kidney disease (HCC)  GFR is up from last yr.  Cr and alb/cr ratio is wnl.    Morbid obesity with BMI of 40.0-44.9, adult (HCC)  Stable at this time    Adrenal nodule (HCC)  He was stable with his last MRI.  Will be due updated in 2/23.  No current or tx needed    Liver hemangioma  MRI in 2/22 showed it was stable.        Patient Active Problem List    Diagnosis Date Noted    Chickenpox 08/15/2022    Liver hemangioma 01/31/2022    Adrenal nodule (HCC) 01/31/2022    Arthritis     Decreased hearing of right ear 10/01/2020    Morbid obesity with BMI of 40.0-44.9, adult (HCC) 09/04/2020    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/2018    Incisional hernia, without obstruction or gangrene 07/18/2017    Stage 3a chronic kidney disease (HCC) 01/12/2017    Chronic lower back pain 06/23/2015    Dermatitis, seborrheic 06/23/2015    Dental disorder 12/17/2014    Tinnitus of both ears 10/13/2014    Chronic venous insufficiency 09/04/2014    HTN (hypertension) 09/04/2014    Migraines  09/04/2014    GERD (gastroesophageal reflux disease) 09/04/2014    Seasonal allergies 09/04/2014    Sleep apnea with use of continuous positive airway pressure (CPAP) 09/04/2014       Current Outpatient Medications   Medication Sig Dispense Refill    triamcinolone acetonide (KENALOG) 0.1 % Ointment Apply 1 Dose topically 2 times a day. 15 g 1    omeprazole (PRILOSEC) 20 MG delayed-release capsule TAKE 1 CAPSULE BY MOUTH EVERY DAY 90 Capsule 3    triamterene-hctz (MAXZIDE-25/DYAZIDE) 37.5-25 MG Tab TAKE 1 TABLET BY MOUTH EVERY DAY 90 Tablet 3    allopurinol (ZYLOPRIM) 300 MG Tab TAKE 1 TABLET BY MOUTH EVERY DAY 90 Tablet 3    tacrolimus (PROTOPIC) 0.1 % Ointment Apply 1 Dose topically 2 times a day. Indications: Psoriasis 30 g 2    ketoconazole (NIZORAL) 2 % shampoo Apply 5 mL topically 1 time a day as needed for Itching. 100 mL 1    amoxicillin (AMOXIL) 500 MG Cap TAKE 4 CAPS BY MOUTH before dentist appointment 30 capsule 0    fluticasone (FLONASE) 50 MCG/ACT nasal spray Administer 1 Spray into affected nostril(S) 2 times a day. 16 mL 0    multivitamin (THERAGRAN) Tab Take 1 Tab by mouth every day.      acetaminophen (TYLENOL) 500 MG Tab Take 1,000 mg by mouth every 6 hours as needed (MIGRAINE).      vitamin D (CHOLECALCIFEROL) 1000 UNIT Tab Take 2,000 Units by mouth every day.       No current facility-administered medications for this visit.        Patient is taking medications as noted in medication list.  Current supplements as per medication list.     Allergies: Patient has no known allergies.    Current social contact/activities:    Is patient current with immunizations? Yes.    He  reports that he quit smoking about 28 years ago. His smoking use included cigarettes. He has a 25.00 pack-year smoking history. He has never used smokeless tobacco. He reports that he does not currently use alcohol. He reports that he does not use drugs.  Counseling given: Not Answered      DPA/Advanced directive: Patient does not  have an Advanced Directive.  A packet and workshop information was given on Advanced Directives.    ROS:    Gait: Uses a  scooter  Ostomy: No   Other tubes: No   Amputations: No   Chronic oxygen use No   Last eye exam  December 2020  Wears hearing aids: No   : Denies any urinary leakage during the last 6 months  Review of Systems   Constitutional: Negative.  Negative for fever, chills, weight loss, malaise/fatigue and diaphoresis.   HENT: Negative.  Negative for hearing loss, ear pain, nosebleeds, congestion, sore throat, neck pain, tinnitus and ear discharge.    Eyes: Negative.  Negative for blurred vision, double vision, photophobia, pain, discharge and redness.   Respiratory: Negative.  Negative for cough, hemoptysis, sputum production, shortness of breath, wheezing and stridor.    Cardiovascular: Negative.  Negative for chest pain, palpitations, orthopnea, claudication, lPND.   Gastrointestinal: Negative.  Negative for heartburn, nausea, vomiting, abdominal pain, diarrhea, constipation, blood in stool and melena.   Genitourinary: Negative.  Negative for dysuria, urgency, frequency, incontinence, hematuria and flank pain.   Musculoskeletal: Negative.  Negative for myalgias, back pain, falls.   Skin: Negative.  Negative for itching and rash.   Neurological: Negative.  Negative for dizziness, tingling, tremors, sensory change, speech change, focal weakness, seizures, loss of consciousness, weakness and headaches.   Endo/Heme/Allergies: Negative.  Negative for environmental allergies and polydipsia. Does not bruise/bleed easily.   Psychiatric/Behavioral: Negative.  Negative for depression, suicidal ideas, hallucinations, memory loss and substance abuse. The patient is not nervous/anxious and does not have insomnia.    All other systems reviewed and are negative.        Screening:  none  Depression Screening  Little interest or pleasure in doing things?  0 - not at all  Feeling down, depressed, or hopeless? 0 - not  at all  Trouble falling or staying asleep, or sleeping too much?     Feeling tired or having little energy?     Poor appetite or overeating?     Feeling bad about yourself - or that you are a failure or have let yourself or your family down?    Trouble concentrating on things, such as reading the newspaper or watching television?    Moving or speaking so slowly that other people could have noticed.  Or the opposite - being so fidgety or restless that you have been moving around a lot more than usual?     Thoughts that you would be better off dead, or of hurting yourself?     Patient Health Questionnaire Score:      If depressive symptoms identified deferred to follow up visit unless specifically addressed in assessment and plan.    Interpretation of PHQ-9 Total Score   Score Severity   1-4 No Depression   5-9 Mild Depression   10-14 Moderate Depression   15-19 Moderately Severe Depression   20-27 Severe Depression      Screening for Cognitive Impairment  Three Minute Recall (daughter, heaven, mountain)  3/3    Draw clock face with all 12 numbers and set the hands to show 10 past 11.  Yes    If cognitive concerns identified, deferred for follow up unless specifically addressed in assessment and plan.    Fall Risk Assessment  Has the patient had two or more falls in the last year or any fall with injury in the last year?  No  If fall risk identified, deferred for follow up unless specifically addressed in assessment and plan.    Safety Assessment  Throw rugs on floor.  Yes  Handrails on all stairs.  Yes  Good lighting in all hallways.  Yes  Difficulty hearing.  Yes  Patient counseled about all safety risks that were identified.    Functional Assessment ADLs  Are there any barriers preventing you from cooking for yourself or meeting nutritional needs?  No.    Are there any barriers preventing you from driving safely or obtaining transportation?  No.    Are there any barriers preventing you from using a telephone or  calling for help?  No.    Are there any barriers preventing you from shopping?  No.    Are there any barriers preventing you from taking care of your own finances?  No.    Are there any barriers preventing you from managing your medications?    No.    Are there any barriers preventing you from showering, bathing or dressing yourself?  No.    Are you currently engaging in any exercise or physical activity?  No.     What is your perception of your health?  Good.    Health Maintenance Summary            Overdue - IMM HEP B VACCINE (1 of 3 - 3-dose series) Overdue - never done      No completion history exists for this topic.              IMM INFLUENZA (1) Next due on 9/1/2022 11/26/2021  Imm Admin: Influenza, Unspecified - HISTORICAL DATA     10/01/2020  Imm Admin: Influenza Vaccine Adult HD     12/12/2019  Imm Admin: Influenza, Unspecified - HISTORICAL DATA     11/17/2018  Imm Admin: Influenza Vaccine Quad Inj (Pf)     11/30/2017  Imm Admin: Influenza Vaccine-Flucelvax - HISTORICAL DATA     Only the first 5 history entries have been loaded, but more history exists.            COLORECTAL CANCER SCREENING (COLONOSCOPY - Every 5 Years) Next due on 8/6/2023 08/06/2018  REFERRAL TO GI FOR COLONOSCOPY             Annual Wellness Visit (Every 366 Days) Next due on 8/16/2023      08/15/2022  Done     07/28/2021  Level of Service: ND ANNUAL WELLNESS VISIT-INCLUDES PPPS SUBSEQUE*     09/04/2020  Subsequent Annual Wellness Visit - Includes PPPS ()     09/04/2020  Visit Dx: Medicare annual wellness visit, subsequent     09/03/2019  Subsequent Annual Wellness Visit - Includes PPPS ()     Only the first 5 history entries have been loaded, but more history exists.            IMM DTaP/Tdap/Td Vaccine (2 - Td or Tdap) Next due on 6/16/2025 06/16/2015  Imm Admin: Tdap Vaccine             IMM ZOSTER VACCINES (Series Information) Completed      02/06/2019  Imm Admin: Zoster Vaccine Recombinant (RZV) (SHINGRIX)      2018  Imm Admin: Zoster Vaccine Recombinant (RZV) (SHINGRIX)     2016  Imm Admin: Zoster Vaccine Live (ZVL) (Zostavax) - HISTORICAL DATA             ABDOMINAL AORTIC ANEURYSM (AAA) SCREEN  Completed      2019  CT-ABDOMEN-PELVIS WITH             HEPATITIS C SCREENING  Completed      2020  HCV Scrn ( [id number] 1xLife)             IMM PNEUMOCOCCAL VACCINE: 65+ Years (Series Information) Completed      10/08/2020  Imm Admin: Pneumococcal polysaccharide vaccine (PPSV-23)     2019  Imm Admin: Pneumococcal Conjugate Vaccine (Prevnar/PCV-13)             COVID-19 Vaccine (Series Information) Completed      2022  Imm Admin: COVID-19 Vaccine, unspecified - HISTORICAL DATA     2022  Imm Admin: COVID-19 Vaccine, unspecified - HISTORICAL DATA     2021  Imm Admin: PFIZER PURPLE CAP SARS-COV-2 VACCINATION (12+)     2021  Imm Admin: PFIZER PURPLE CAP SARS-COV-2 VACCINATION (12+)     2021  Imm Admin: PFIZER PURPLE CAP SARS-COV-2 VACCINATION (12+)             IMM MENINGOCOCCAL ACWY VACCINE (Series Information) Aged Out      No completion history exists for this topic.                    Patient Care Team:  RAFAEL Arana as PCP - General (Family Medicine)  Hyacinth Timmons M.D. as PCP - [facility] Paneled  Julian Carmen M.D. as Consulting Physician (Surgery)  Oscar Avila M.D. as Consulting Physician (Orthopedic Surgery)  Sulaiman Hook D.D.SDieter as Consulting Physician (Dentistry)  Bhavik Palumbo M.D. as Consulting Physician (Gastroenterology)  Man Mancia as [facility]   [employer] (DME Supplier)  [employer] as Respiratory Therapist (DME Services)    Social History     Tobacco Use    Smoking status: Former     Packs/day: 1.00     Years: 25.00     Pack years: 25.00     Types: Cigarettes     Quit date: 1994     Years since quittin.6    Smokeless tobacco: Never   Vaping Use    Vaping Use: Never used   Substance Use Topics    Alcohol use:  Not Currently     Alcohol/week: 0.0 oz     Comment: one a month    Drug use: No     Family History   Problem Relation Age of Onset    Diabetes Mother     Arthritis Mother     Lung Disease Father     Arthritis Father     Heart Disease Father     Prostate cancer Father     Hypertension Father     Alcohol/Drug Brother      He  has a past medical history of Adrenal nodule (HCC) (01/31/2022), Arthritis, Chickenpox, Chronic kidney disease (CKD), stage III (moderate) (HCC) (01/12/2017), Chronic lower back pain (06/23/2015), Chronic venous insufficiency (09/04/2014), Decreased hearing of right ear (10/01/2020), Dental disorder (12/17/2014), Dermatitis, seborrheic (06/23/2015), GERD (gastroesophageal reflux disease), Headache, classical migraine, HTN (hypertension) (09/04/2014), Idiopathic chronic gout of multiple sites with tophus (07/17/2018), Incisional hernia, without obstruction or gangrene (07/18/2017), Liver hemangioma (01/31/2022), Morbid obesity with BMI of 40.0-44.9, adult (HCC) (09/04/2020), Seasonal allergies (09/04/2014), Sleep apnea, and Tinnitus of both ears (10/13/2014).   Past Surgical History:   Procedure Laterality Date    CA LAP,DIAGNOSTIC ABDOMEN  8/7/2019    Procedure: LAPAROSCOPY-DIAGNOSTIC;  Surgeon: Julian Carmen M.D.;  Location: [facility];  Service: General    LAPAROSCOPIC LYSIS OF ADHESIONS  8/7/2019    Procedure: LYSIS, ADHESIONS, LAPAROSCOPIC;  Surgeon: Julian Carmen M.D.;  Location: SURGERY [facility];  Service: General    INCISION HERNIA REPAIR  8/7/2019    Procedure: REPAIR, HERNIA, INCISIONAL- POSSIBLE;  Surgeon: Julian Carmen M.D.;  Location: SURGERY [facility];  Service: General    VENTRAL HERNIA REPAIR LAPAROSCOPIC  10/1/2018    Procedure: VENTRAL HERNIA REPAIR LAPAROSCOPIC- FOR INCISIONAL HERNIA WITH MESH;  Surgeon: Julian Carmen M.D.;  Location: SURGERY [facility];  Service: General    GASTRIC SLEEVE LAPAROSCOPY  12/29/2014    Performed by Julian Carmen M.D.  "at SURGERY [facility]    KNEE REPLACEMENT, TOTAL  2012    left    OTHER ORTHOPEDIC SURGERY  1998    bicep tendon repair    APPENDECTOMY      ARTHROSCOPY, KNEE      CHOLECYSTECTOMY      SLEEVE,ANA VASO THIGH      TONSILLECTOMY           Exam:   /80 (BP Location: Right arm, Patient Position: Sitting, BP Cuff Size: Large adult)   Pulse 84   Temp 36.4 °C (97.5 °F)   Resp 14   Ht 1.854 m (6' 1\")   Wt (!) 148 kg (326 lb)   SpO2 97%  Body mass index is 43.01 kg/m².    Hearing good.    Dentition good  Alert, oriented in no acute distress  Eye contact is good, speech goal directed, affect calm  Physical Exam   Vitals reviewed.  Constitutional: oriented to person, place, and time. appears well-developed and well-nourished. No distress.   HENT: Head: Normocephalic and atraumatic. Bilateral tympanic membranes wnl w/o bulging.  Right Ear: External ear normal. Left Ear: External ear normal. Nose: Nose normal.  Mouth/Throat: Oropharynx is clear and moist. No oropharyngeal exudate. opal tm wnl. Eyes: Conjunctivae and EOM are normal. Pupils are equal, round, and reactive to light. Right eye exhibits no discharge. Left eye exhibits no discharge. No scleral icterus.    Neck: Normal range of motion. Neck supple. No JVD present.   Cardiovascular: Normal rate, regular rhythm, normal heart sounds and intact distal pulses.  Exam reveals no gallop and no friction rub.  No murmur heard.  No carotid bruits   Pulmonary/Chest: Effort normal and breath sounds normal. No stridor. No respiratory distress. no wheezes or rales. exhibits no tenderness.   Abdominal: Soft. Bowel sounds are normal. exhibits no distension and no mass. No tenderness. no rebound and no guarding.   Musculoskeletal: Normal range of motion. exhibits no edema or tenderness.  opal pedal pulses 2+.  Lymphadenopathy:  no cervical or supraclavicular adenopathy.   Neurological: alert and oriented to person, place, and time. has normal reflexes. displays normal " reflexes. No cranial nerve deficit. exhibits normal muscle tone. Coordination normal.   Skin: Skin is warm and dry. No rash noted. no diaphoresis. No erythema. No pallor.   Psychiatric: normal mood and affect. behavior is normal.       Assessment and Plan. The following treatment and monitoring plan is recommended:       1. Primary hypertension      stable and controlled on meds      2. Sleep apnea with use of continuous positive airway pressure (CPAP)      followed by pulm and had sleep apnea but d/t availability of supplies cannot get CPAP for 6 mo.        3. Stage 3a chronic kidney disease (HCC)      improve over last yr with inc water intake.  all is wnl      4. Iron deficiency anemia, unspecified iron deficiency anemia type  Referral to Gastroenterology    new onset anemia.  refer GI for eval d/t significant dec in h/h on CBC      5. IFG (impaired fasting glucose)  HEMOGLOBIN A1C    a1c up sl.  enc pt to improve healthy diet and do regular exercise.  frank 6 mo.  fu for review      6. Elevated PSA  Referral to Urology    up 1 pt from 2.5 to 3.5.  refer urology for eval      7. Adrenal nodule (HCC)      MRI 2/22 shows stable.  will rechek 2/23.      8. Liver hemangioma      mri 2/22 showed stablity.  will continue to monitor      9. Morbid obesity with BMI of 40.0-44.9, adult (HCC)      enc pt to improve healthy diet and do regular exercise            Services suggested: No services needed at this time  Health Care Screening recommendations as per orders if indicated.  Referrals offered: PT/OT/Nutrition counseling/Behavioral Health/Smoking cessation as per orders if indicated.    Discussion today about general wellness and lifestyle habits:    Prevent falls and reduce trip hazards; Cautioned about securing or removing rugs.  Have a working fire alarm and carbon monoxide detector;   Engage in regular physical activity and social activities.     Follow-up: Return in about 6 months (around 2/15/2023), or for lab  review.

## 2022-08-15 NOTE — ASSESSMENT & PLAN NOTE
He just saw pulm and did formal sleep study.  He needs machine but cannot get d/t supplies for 6 mo

## 2022-08-18 NOTE — TELEPHONE ENCOUNTER
[facility] Progress Note    Subjective   Patient ID:  Lester is a 75 year old male.     HPI  Patient seen and evaluated patient examined.    Patient with multiple medical issues offers no new complaints at this time seen by us for respiratory problems, I think most of patient's respiratory problems has to do with diastolic heart failure pleural effusions and kidney problems    Underwent bilateral venous Doppler studies to make sure were not missing any other pathology this is also negative  Review of Systems   Constitutional: Negative for activity change, appetite change, chills, diaphoresis, fatigue, fever and unexpected weight change.   HENT: Negative for congestion, mouth sores, nosebleeds, postnasal drip, rhinorrhea, sinus pressure, sinus pain, sneezing, sore throat, trouble swallowing and voice change.    Respiratory: Positive for shortness of breath. Negative for apnea, cough, choking, chest tightness, wheezing and stridor.    Cardiovascular: Positive for leg swelling. Negative for chest pain and palpitations.   Gastrointestinal: Negative for abdominal distention and abdominal pain.   Skin: Negative for pallor, rash and wound.   Allergic/Immunologic: Negative for environmental allergies, food allergies and immunocompromised state.   All other systems reviewed and are negative.      Current Facility-Administered Medications   Medication   • hydrALAZINE (APRESOLINE) tablet 50 mg   • isosorbide mononitrate (IMDUR) ER tablet 30 mg   • ondansetron (ZOFRAN) injection 4 mg   • melatonin tablet 3 mg   • magnesium hydroxide (MILK OF MAGNESIA) 400 MG/5ML suspension 30 mL   • senna (SENOKOT) 8.6 mg   • polyethylene glycol (MIRALAX) packet 17 g   • hydrALAZINE (APRESOLINE) tablet 50 mg   • sodium chloride 0.9 % flush bag 25 mL   • Potassium Standard Replacement Protocol (Levels 3.5 and lower)   • Potassium Replacement (Levels 3.6 - 4)   • Magnesium Standard Replacement Protocol   • Phosphorus Standard Replacement Protocol   •  Was the patient seen in the last year in this department? Yes    Does patient have an active prescription for medications requested? No     Received Request Via: Pharmacy   acetaminophen (TYLENOL) tablet 650 mg   • aluminum-magnesium hydroxide-simethicone (MAALOX) 200-200-20 MG/5ML suspension 30 mL   • albuterol inhaler 2 puff   • carvedilol (COREG) tablet 25 mg   • latanoprost (XALATAN) 0.005 % ophthalmic solution 1 drop   • levothyroxine (SYNTHROID, LEVOTHROID) tablet 100 mcg   • pravastatin (PRAVACHOL) tablet 40 mg   • bumetanide (BUMEX) injection 2 mg   • heparin (porcine) injection 5,000 Units   • allopurinol (ZYLOPRIM) tablet 100 mg   • aspirin (ECOTRIN) enteric coated tablet 81 mg   • calcitRIOL (ROCALTROL) capsule 0.25 mcg   • citalopram (CeleXA) tablet 20 mg   • tamsulosin (FLOMAX) capsule 0.8 mg   • gabapentin (NEURONTIN) capsule 300 mg   • pantoprazole (PROTONIX) EC tablet 40 mg       Allergies: ALLERGIES:  Patient has no known allergies.      Objective     Visit Vitals  /69   Pulse 76   Temp 97.7 °F (36.5 °C) (Oral)   Resp 17   Ht 5' 9\" (1.753 m)   Wt (P) 127.1 kg (280 lb 3.3 oz)   SpO2 98%   BMI (P) 41.38 kg/m²       Physical Exam  Vitals and nursing note reviewed.   Constitutional:       General: He is not in acute distress.     Appearance: He is well-developed.      Comments: Alert male no distress   HENT:      Head: Normocephalic.   Neck:      Thyroid: No thyromegaly.      Vascular: No JVD.      Trachea: No tracheal deviation.   Cardiovascular:      Rate and Rhythm: Normal rate and regular rhythm.      Heart sounds: Normal heart sounds. No murmur heard.    No gallop.   Pulmonary:      Effort: No accessory muscle usage or respiratory distress.      Breath sounds: No stridor. No decreased breath sounds, wheezing, rhonchi or rales.      Comments: Good air entry bilaterally not wheezing  Musculoskeletal:      Cervical back: Normal range of motion and neck supple.      Comments: Trace leg edema   Lymphadenopathy:      Cervical: No cervical adenopathy.         Labs:  WBC (K/mcL)   Date Value   08/15/2022 7.3     Sodium (mmol/L)   Date Value   08/18/2022 139      Potassium (mmol/L)   Date Value   08/18/2022 3.7     Chloride (mmol/L)   Date Value   08/18/2022 104     Carbon Dioxide (mmol/L)   Date Value   08/18/2022 27     BUN (mg/dL)   Date Value   08/18/2022 30 (H)     Creatinine (mg/dL)   Date Value   08/18/2022 3.32 (H)     Glucose (mg/dL)   Date Value   08/18/2022 99     Calcium (mg/dL)   Date Value   08/18/2022 8.6     RBC (mil/mcL)   Date Value   08/15/2022 3.50 (L)     HGB (g/dL)   Date Value   08/15/2022 10.1 (L)     HCT (%)   Date Value   08/15/2022 32.0 (L)     PLT (K/mcL)   Date Value   08/15/2022 193       Imaging:  US VAS LOWER EXTREMITY VENOUS DUPLEX BILATERAL  Narrative: EXAMINATION: US VAS LOWER EXTREMITY VENOUS DUPLEX BILATERAL    DATE OF EXAM: 8/18/2022 9:00 AM    COMPARISON: None.    HISTORY: Leg deep vein thrombosis (DVT) suspected.  Dyspnea.    TECHNIQUE:    Grayscale imaging and color Doppler imaging was performed.     FINDINGS:    Proximal veins: Evaluation of the bilateral lower extremity shows the  common femoral vein, proximal deep femoral vein, femoral vein and popliteal  vein are compressible throughout, with normal venous waveforms, and good  augmentation and fill-in on color Doppler.      Superficial veins: Bilateral greater saphenous veins at the saphenofemoral  junction is compressible and shows flow on color Doppler imaging and  spectral waveform analysis.     Below the knee veins: Posterior tibial and peroneal veins are seen in the  calf and show focal filling on color Doppler.  Impression: No lower extremity deep venous thrombosis is seen bilaterally.    Electronically Signed by: GENESIS HOFFMAN M.D.   Signed on: 8/18/2022 9:50 AM   CT CHEST WO CONTRAST  Narrative: DATE OF EXAM: 8/18/2022 7:41 AM    EXAMINATION: CT CHEST WO CONTRAST    COMPARISON: Radiograph 08/17/2022, CT abdomen 05/05/2021    HISTORY: Dyspnea, chronic, unclear etiology. Shortness of breath with  underlying infiltrate over the the LLL. No clinical evidence of  infection,  chronic smoker 20 over than 20 years.     TECHNIQUE:  Axial images from above the thoracic inlet to below the level of diaphragm  were obtained without intravenous contrast. Standard dose reduction  techniques such as atelectatic exposure control were utilized. Reformatted  sagittal and coronal images were created from axial images.    FINDINGS:  Surgical clips at the paratracheal region are suggestive of prior  thyroidectomy.    The aorta has normal course and caliber.    There is mild cardiomegaly. There are coronary artery calcifications. There  is small pericardial effusion.     Bilateral nannette cannot be evaluated for lymph nodes given lack of IV  contrast.  There is no mediastinal lymphadenopathy.    The images are obtained in expiratory phase.  Subtle predominantly  bibasilar groundglass opacities could be due to phase of imaging and  atelectatic changes.      There is no lung consolidation or mass.     There is small right and trace left pleural effusion.    There is no displaced rib fracture.    There is a partially exophytic 2.5 cm isodense lesion at the upper pole of  the right kidney, not well evaluated.  Impression: 1. Small right and trace left pleural effusion.  2. 2.5 cm right renal mass, not well evaluated.  This could represent a  proteinaceous cyst or a mass and requires further evaluation with CT or MRI  renal protocol on nonemergent basis.     Electronically Signed by: GENESIS HOFFMAN M.D.   Signed on: 8/18/2022 9:23 AM        IMPRESSION:   Dyspnea multifactorial most likely related to cardiac and renal problems and possible underlying mild COPD.    Obesity needs outpatient sleep studies.      PLAN:  From pulmonary point doing okay no new pulmonary intervention will be indicated    CT results seen patient had MRI of abdomen done 2021, May 2021 there is mention of kidney tumor or kidney mass.    I inquired with the patient, states he has some kidney problem and does not know exactly  what    Patient is strongly advised to discuss with his primary as far as what are his kidney problems.    Defer kidney issues to attending and nephrology consultants    From pulmonary point respiratory condition is stable venous Doppler is negative may go home defer to other problems interventions to respective consultants  Jean Pierer Castillo MD

## 2022-10-02 DIAGNOSIS — R21 FACIAL RASH: ICD-10-CM

## 2022-10-02 RX ORDER — KETOCONAZOLE 20 MG/ML
5 SHAMPOO TOPICAL
Qty: 100 ML | Refills: 1 | Status: SHIPPED | OUTPATIENT
Start: 2022-10-02 | End: 2023-05-02

## 2022-10-02 RX ORDER — TRIAMCINOLONE ACETONIDE 1 MG/G
1 OINTMENT TOPICAL 2 TIMES DAILY
Qty: 15 G | Refills: 3 | Status: SHIPPED | OUTPATIENT
Start: 2022-10-02 | End: 2023-08-16

## 2022-10-13 ENCOUNTER — OFFICE VISIT (OUTPATIENT)
Dept: SLEEP MEDICINE | Facility: MEDICAL CENTER | Age: 67
End: 2022-10-13
Payer: MEDICARE

## 2022-10-13 VITALS
WEIGHT: 315 LBS | HEIGHT: 73 IN | BODY MASS INDEX: 41.75 KG/M2 | RESPIRATION RATE: 16 BRPM | OXYGEN SATURATION: 98 % | HEART RATE: 94 BPM | SYSTOLIC BLOOD PRESSURE: 130 MMHG | DIASTOLIC BLOOD PRESSURE: 60 MMHG

## 2022-10-13 DIAGNOSIS — Z87.891 FORMER SMOKER: ICD-10-CM

## 2022-10-13 DIAGNOSIS — G47.33 OBSTRUCTIVE SLEEP APNEA: ICD-10-CM

## 2022-10-13 DIAGNOSIS — E66.01 MORBID OBESITY WITH BMI OF 40.0-44.9, ADULT (HCC): ICD-10-CM

## 2022-10-13 DIAGNOSIS — I10 PRIMARY HYPERTENSION: ICD-10-CM

## 2022-10-13 PROCEDURE — 99214 OFFICE O/P EST MOD 30 MIN: CPT | Performed by: NURSE PRACTITIONER

## 2022-10-13 ASSESSMENT — FIBROSIS 4 INDEX: FIB4 SCORE: 1.7

## 2022-10-13 NOTE — PROGRESS NOTES
Chief Complaint   Patient presents with    Follow-Up     last seen 7/11//2022     Other     set up aug 10th 2022       HPI:  Max Alberto is a 67 y.o. year old male here today for follow-up on DEDRA; 1st compliance check.  Last OV 7/11/22     Currently using BIPAP @ 17/12cm H20 nightly; RESMED; device obtained 2022.  Compliance report 9/13/2022 through 10/12/2020 tunic is 100% compliance, average nightly use 5 hours 55 minutes, significant mask leak with reduced AHI of 0.8/h.  Reviewed with patient.  He notes ongoing mask issues and significant dry mouth.  He did try CPAP mouth tape but found it difficult to use due to his facial hair.  He did get a smaller fitting mask from the [facility] which she did find fit better but he continues to have leaking issues.  He generally sleeps on his side.  He will go to bed between 930 and 10:30 PM and fall asleep within 15 minutes.  He generally sleeps on his side.  He will wake 2-3 times at night to reposition and has been waking up by 4 AM and unable to resume sleep.  He denies headaches.  He will nod off in his chair in front of TV a few days a week but only for 5 to 10 minutes per his wife.  He has not tried over-the-counter XyliMelts or lubricating mouthwash/spray.  He was not provided a chinstrap.    He tested positive for COVID-19 5 days ago but yesterday was negative.  He notes very mild symptoms and used Viridiana-Estero for relief of symptoms.  He has no cardiac or respiratory complaints today.    Sleep hx:  Patient has a history of sleep apnea and previously diagnosed in 2002 in [place].  He was initially started on CPAP in the early 2000's and switched to BiPAP in 2014.  He also underwent gastric sleeve in 2013 and lost 60 pounds.   Updated home sleep study 5/11/2022 noted severe sleep apnea with GIOVANI 68.0/h and O2 heidi of 58%.  Patient saturations were less than 90% for 82% of evaluation time.  The majority of events are obstructive apneas.  He underwent titration  study 6/26/2022 noting best response to BiPAP 17/12 cm with reduced AHI of 1.9/h and O2 heidi of 86%.  He did achieve REM sleep in supine position on this setting.  There was some noted nocturnal hypoxia during study but will further evaluate once he is acclimated therapy.    ROS: As per HPI and otherwise negative if not stated.    Past Medical History:   Diagnosis Date    Adrenal nodule ([facility]) 01/31/2022    Arthritis     Osteo-generalized    Chickenpox     Chronic kidney disease (CKD), stage III (moderate) (HCC) 01/12/2017    Chronic lower back pain 06/23/2015    Chronic venous insufficiency 09/04/2014    Decreased hearing of right ear 10/01/2020    Dental disorder 12/17/2014    had two extracted last week 12/09/14=were infected     Dermatitis, seborrheic 06/23/2015     IMO load March 2020    GERD (gastroesophageal reflux disease)     Headache, classical migraine     HTN (hypertension) 09/04/2014    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/2018    Incisional hernia, without obstruction or gangrene 07/18/2017    Liver hemangioma 01/31/2022    Morbid obesity with BMI of 40.0-44.9, adult ([facility]) 09/04/2020    Seasonal allergies 09/04/2014    Sleep apnea     BiPAP    Tinnitus of both ears 10/13/2014       Past Surgical History:   Procedure Laterality Date    DE LAP,DIAGNOSTIC ABDOMEN  8/7/2019    Procedure: LAPAROSCOPY-DIAGNOSTIC;  Surgeon: Julian Carmen M.D.;  Location: [facility];  Service: General    LAPAROSCOPIC LYSIS OF ADHESIONS  8/7/2019    Procedure: LYSIS, ADHESIONS, LAPAROSCOPIC;  Surgeon: Julian Carmen M.D.;  Location: SURGERY [facility];  Service: General    INCISION HERNIA REPAIR  8/7/2019    Procedure: REPAIR, HERNIA, INCISIONAL- POSSIBLE;  Surgeon: Julian Carmen M.D.;  Location: SURGERY [facility];  Service: General    VENTRAL HERNIA REPAIR LAPAROSCOPIC  10/1/2018    Procedure: VENTRAL HERNIA REPAIR LAPAROSCOPIC- FOR INCISIONAL HERNIA WITH MESH;  Surgeon: Julian ROSALES  LAVON Carmen;  Location: SURGERY [facility];  Service: General    GASTRIC SLEEVE LAPAROSCOPY  2014    Performed by Julian Carmen M.D. at SURGERY [facility]    KNEE REPLACEMENT, TOTAL  2012    left    OTHER ORTHOPEDIC SURGERY      bicep tendon repair    APPENDECTOMY      ARTHROSCOPY, KNEE      CHOLECYSTECTOMY      SLEEVE,ANA VASO THIGH      TONSILLECTOMY         Family History   Problem Relation Age of Onset    Diabetes Mother     Arthritis Mother     Lung Disease Father     Arthritis Father     Heart Disease Father     Prostate cancer Father     Hypertension Father     Alcohol/Drug Brother        Social History     Socioeconomic History    Marital status:      Spouse name: Not on file    Number of children: Not on file    Years of education: Not on file    Highest education level: Associate degree: occupational, technical, or vocational program   Occupational History    Not on file   Tobacco Use    Smoking status: Former     Packs/day: 1.00     Years: 25.00     Pack years: 25.00     Types: Cigarettes     Quit date: 1994     Years since quittin.8    Smokeless tobacco: Never   Vaping Use    Vaping Use: Never used   Substance and Sexual Activity    Alcohol use: Not Currently     Alcohol/week: 0.0 oz     Comment: one a month    Drug use: No    Sexual activity: Yes     Partners: Female   Other Topics Concern    Not on file   Social History Narrative    Not on file     Social Determinants of Health     Financial Resource Strain: Low Risk     Difficulty of Paying Living Expenses: Not hard at all   Food Insecurity: No Food Insecurity    Worried About Running Out of Food in the Last Year: Never true    Ran Out of Food in the Last Year: Never true   Transportation Needs: No Transportation Needs    Lack of Transportation (Medical): No    Lack of Transportation (Non-Medical): No   Physical Activity: Inactive    Days of Exercise per Week: 0 days    Minutes of Exercise per Session: 0 min  "  Stress: No Stress Concern Present    Feeling of Stress : Not at all   Social Connections: Socially Integrated    Frequency of Communication with Friends and Family: More than three times a week    Frequency of Social Gatherings with Friends and Family: Twice a week    Attends Restoration Services: More than 4 times per year    Active Member of Clubs or Organizations: Yes    Attends Club or Organization Meetings: Never    Marital Status:    Intimate Partner Violence: Not on file   Housing Stability: Low Risk     Unable to Pay for Housing in the Last Year: No    Number of Places Lived in the Last Year: 1    Unstable Housing in the Last Year: No       Allergies as of 10/13/2022    (No Known Allergies)        Vitals:  /60 (BP Location: Left arm, Patient Position: Sitting, BP Cuff Size: Adult)   Pulse 94   Resp 16   Ht 1.854 m (6' 1\")   Wt (!) 148 kg (326 lb)   SpO2 98%     Current medications as of today   Current Outpatient Medications   Medication Sig Dispense Refill    triamcinolone acetonide (KENALOG) 0.1 % Ointment Apply 1 Dose topically 2 times a day. 15 g 3    ketoconazole (NIZORAL) 2 % shampoo Apply 5 mL topically 1 time a day as needed for Itching. 100 mL 1    omeprazole (PRILOSEC) 20 MG delayed-release capsule TAKE 1 CAPSULE BY MOUTH EVERY DAY 90 Capsule 3    triamterene-hctz (MAXZIDE-25/DYAZIDE) 37.5-25 MG Tab TAKE 1 TABLET BY MOUTH EVERY DAY 90 Tablet 3    allopurinol (ZYLOPRIM) 300 MG Tab TAKE 1 TABLET BY MOUTH EVERY DAY 90 Tablet 3    tacrolimus (PROTOPIC) 0.1 % Ointment Apply 1 Dose topically 2 times a day. Indications: Psoriasis 30 g 2    amoxicillin (AMOXIL) 500 MG Cap TAKE 4 CAPS BY MOUTH before dentist appointment 30 capsule 0    fluticasone (FLONASE) 50 MCG/ACT nasal spray Administer 1 Spray into affected nostril(S) 2 times a day. 16 mL 0    multivitamin (THERAGRAN) Tab Take 1 Tab by mouth every day.      acetaminophen (TYLENOL) 500 MG Tab Take 1,000 mg by mouth every 6 hours as " needed (MIGRAINE).      vitamin D (CHOLECALCIFEROL) 1000 UNIT Tab Take 2,000 Units by mouth every day.       No current facility-administered medications for this visit.         Physical Exam:   Gen:           Alert and oriented, No apparent distress. Mood and affect appropriate, normal interaction with examiner.  Eyes:          PERRL, EOM intact, sclere white, conjunctive moist.  Ears:          Not examined.   Hearing:     Grossly intact.  Nose:          Normal, no lesions or deformities.  Dentition:    mask  Oropharynx:   mask  Mallampati Classification: mask  Neck:        Supple, trachea midline, no masses.  Respiratory Effort: No intercostal retractions or use of accessory muscles.   Lung Auscultation:      Clear to auscultation bilaterally; no rales, rhonchi or wheezing.  CV:            Regular rate and rhythm. No murmurs, rubs or gallops.  Abd:           Not examined.   Lymphadenopathy: Not examined.  Gait and Station: Normal.  Digits and Nails: No clubbing, cyanosis, petechiae, or nodes.   Cranial Nerves: II-XII grossly intact.  Skin:        No rashes, lesions or ulcers noted.               Ext:           Compression stockings in place      Assessment:  1. Obstructive sleep apnea        2. Primary hypertension        3. Morbid obesity with BMI of 40.0-44.9, adult (HCC)  HEIGHT AND WEIGHT      4. Former smoker            Immunizations:    Flu:recommend  Pneumovax 23:2020  Prevnar 13:2019  PCV 20: not due  COVID-19: 5 vaccinations completed    Plan:  Patient sleep apnea is well controlled but he is having mask issues and ongoing significant dry mouth.  He will continue BiPAP 17/12 cm.  DME mask/supplies; mask fit now  We reviewed the possible use of cloth liners for his fullface mask for fit options.  Discussed mouth lubricating options such as Biotene products, spry or XyliMelts that he can purchase OTC.  He does know how to use his humidifier and has been adjusting accordingly  CNOX on Pap in 3 months prior  to next office visit.  Discussed sleep hygiene  Follow-up primary care further health concerns include management hypertension and weight loss efforts  Follow-up in 3 months for compliance check with CNOX results, sooner if needed.  If symptoms are well controlled and patient tolerating therapy may go to annual visits.    Please note that this dictation was created using voice recognition software. I have made every reasonable attempt to correct obvious errors, but it is possible there are errors of grammar and possibly content that I did not discover before finalizing the note.

## 2022-10-13 NOTE — PATIENT INSTRUCTIONS
For dry mouth, try sprye spray/mouthwash, biotene toothpaste/mouth wash/spray prior to bedtime or as needed. Can also use chin strap to help keep mouth close, mouth tape or xylimelts (on toothbrush aisle) lozenge to lubricate mouth at night.    Can also look up cloth liner for mask online (silent stark)

## 2022-11-23 ENCOUNTER — HOSPITAL ENCOUNTER (OUTPATIENT)
Dept: LAB | Facility: MEDICAL CENTER | Age: 67
End: 2022-11-23
Attending: INTERNAL MEDICINE
Payer: MEDICARE

## 2022-11-23 LAB
BASOPHILS # BLD AUTO: 0.6 % (ref 0–1.8)
BASOPHILS # BLD: 0.04 K/UL (ref 0–0.12)
EOSINOPHIL # BLD AUTO: 0.25 K/UL (ref 0–0.51)
EOSINOPHIL NFR BLD: 3.6 % (ref 0–6.9)
ERYTHROCYTE [DISTWIDTH] IN BLOOD BY AUTOMATED COUNT: 55.8 FL (ref 35.9–50)
FERRITIN SERPL-MCNC: 12.6 NG/ML (ref 22–322)
HCT VFR BLD AUTO: 41.9 % (ref 42–52)
HGB BLD-MCNC: 12.7 G/DL (ref 14–18)
IMM GRANULOCYTES # BLD AUTO: 0.03 K/UL (ref 0–0.11)
IMM GRANULOCYTES NFR BLD AUTO: 0.4 % (ref 0–0.9)
IRON SATN MFR SERPL: 8 % (ref 15–55)
IRON SERPL-MCNC: 28 UG/DL (ref 50–180)
LYMPHOCYTES # BLD AUTO: 1.95 K/UL (ref 1–4.8)
LYMPHOCYTES NFR BLD: 27.8 % (ref 22–41)
MCH RBC QN AUTO: 25.1 PG (ref 27–33)
MCHC RBC AUTO-ENTMCNC: 30.3 G/DL (ref 33.7–35.3)
MCV RBC AUTO: 82.8 FL (ref 81.4–97.8)
MONOCYTES # BLD AUTO: 0.57 K/UL (ref 0–0.85)
MONOCYTES NFR BLD AUTO: 8.1 % (ref 0–13.4)
NEUTROPHILS # BLD AUTO: 4.17 K/UL (ref 1.82–7.42)
NEUTROPHILS NFR BLD: 59.5 % (ref 44–72)
NRBC # BLD AUTO: 0 K/UL
NRBC BLD-RTO: 0 /100 WBC
PLATELET # BLD AUTO: 208 K/UL (ref 164–446)
PMV BLD AUTO: 9.6 FL (ref 9–12.9)
RBC # BLD AUTO: 5.06 M/UL (ref 4.7–6.1)
TIBC SERPL-MCNC: 352 UG/DL (ref 250–450)
UIBC SERPL-MCNC: 324 UG/DL (ref 110–370)
WBC # BLD AUTO: 7 K/UL (ref 4.8–10.8)

## 2022-11-23 PROCEDURE — 82728 ASSAY OF FERRITIN: CPT

## 2022-11-23 PROCEDURE — 36415 COLL VENOUS BLD VENIPUNCTURE: CPT

## 2022-11-23 PROCEDURE — 85025 COMPLETE CBC W/AUTO DIFF WBC: CPT

## 2022-11-23 PROCEDURE — 83550 IRON BINDING TEST: CPT

## 2022-11-23 PROCEDURE — 83540 ASSAY OF IRON: CPT

## 2022-12-31 DIAGNOSIS — M1A.09X1 IDIOPATHIC CHRONIC GOUT OF MULTIPLE SITES WITH TOPHUS: ICD-10-CM

## 2022-12-31 DIAGNOSIS — I10 ESSENTIAL HYPERTENSION: ICD-10-CM

## 2022-12-31 DIAGNOSIS — K21.9 GASTROESOPHAGEAL REFLUX DISEASE: ICD-10-CM

## 2023-01-02 RX ORDER — OMEPRAZOLE 20 MG/1
20 CAPSULE, DELAYED RELEASE ORAL
Qty: 90 CAPSULE | Refills: 3 | Status: SHIPPED | OUTPATIENT
Start: 2023-01-02 | End: 2023-06-26 | Stop reason: SDUPTHER

## 2023-01-02 RX ORDER — ALLOPURINOL 300 MG/1
300 TABLET ORAL
Qty: 90 TABLET | Refills: 3 | Status: SHIPPED | OUTPATIENT
Start: 2023-01-02 | End: 2023-06-26 | Stop reason: SDUPTHER

## 2023-01-02 RX ORDER — TRIAMTERENE AND HYDROCHLOROTHIAZIDE 37.5; 25 MG/1; MG/1
1 TABLET ORAL
Qty: 90 TABLET | Refills: 3 | Status: SHIPPED | OUTPATIENT
Start: 2023-01-02 | End: 2023-12-22 | Stop reason: SDUPTHER

## 2023-01-04 DIAGNOSIS — M1A.09X1 IDIOPATHIC CHRONIC GOUT OF MULTIPLE SITES WITH TOPHUS: ICD-10-CM

## 2023-01-04 DIAGNOSIS — K21.9 GASTROESOPHAGEAL REFLUX DISEASE: ICD-10-CM

## 2023-01-04 DIAGNOSIS — I10 ESSENTIAL HYPERTENSION: ICD-10-CM

## 2023-01-04 RX ORDER — OMEPRAZOLE 20 MG/1
20 CAPSULE, DELAYED RELEASE ORAL
Qty: 90 CAPSULE | Refills: 0 | Status: SHIPPED | OUTPATIENT
Start: 2023-01-04 | End: 2023-05-02

## 2023-01-04 RX ORDER — TRIAMTERENE AND HYDROCHLOROTHIAZIDE 37.5; 25 MG/1; MG/1
1 TABLET ORAL
Qty: 90 TABLET | Refills: 0 | Status: SHIPPED | OUTPATIENT
Start: 2023-01-04 | End: 2023-05-02

## 2023-01-04 RX ORDER — ALLOPURINOL 300 MG/1
300 TABLET ORAL
Qty: 90 TABLET | Refills: 0 | Status: SHIPPED | OUTPATIENT
Start: 2023-01-04 | End: 2023-05-02

## 2023-01-18 ENCOUNTER — HOME STUDY (OUTPATIENT)
Dept: SLEEP MEDICINE | Facility: MEDICAL CENTER | Age: 68
End: 2023-01-18
Attending: NURSE PRACTITIONER
Payer: MEDICARE

## 2023-01-18 DIAGNOSIS — G47.33 OBSTRUCTIVE SLEEP APNEA: ICD-10-CM

## 2023-01-18 PROCEDURE — 94762 N-INVAS EAR/PLS OXIMTRY CONT: CPT | Performed by: PREVENTIVE MEDICINE

## 2023-02-01 ENCOUNTER — OFFICE VISIT (OUTPATIENT)
Dept: SLEEP MEDICINE | Facility: MEDICAL CENTER | Age: 68
End: 2023-02-01
Payer: MEDICARE

## 2023-02-01 VITALS
WEIGHT: 305 LBS | HEIGHT: 72 IN | BODY MASS INDEX: 41.31 KG/M2 | SYSTOLIC BLOOD PRESSURE: 130 MMHG | HEART RATE: 79 BPM | DIASTOLIC BLOOD PRESSURE: 72 MMHG | OXYGEN SATURATION: 99 %

## 2023-02-01 DIAGNOSIS — G47.34 NOCTURNAL HYPOXIA: ICD-10-CM

## 2023-02-01 DIAGNOSIS — I10 PRIMARY HYPERTENSION: ICD-10-CM

## 2023-02-01 DIAGNOSIS — G47.33 OBSTRUCTIVE SLEEP APNEA: ICD-10-CM

## 2023-02-01 DIAGNOSIS — Z87.891 FORMER SMOKER: ICD-10-CM

## 2023-02-01 PROCEDURE — 99214 OFFICE O/P EST MOD 30 MIN: CPT | Performed by: NURSE PRACTITIONER

## 2023-02-01 ASSESSMENT — PATIENT HEALTH QUESTIONNAIRE - PHQ9: CLINICAL INTERPRETATION OF PHQ2 SCORE: 0

## 2023-02-01 ASSESSMENT — FIBROSIS 4 INDEX: FIB4 SCORE: 1.46

## 2023-02-01 NOTE — PROGRESS NOTES
Chief Complaint   Patient presents with    Follow-Up     Last seen10/13/22 with Kacey Diaz        HPI:  Max Alberto is a 67 y.o. year old male here today for follow-up on DEDRA; 2nd compliance check and OPO results.  Last OV 1/19/23    CNOX results 1/19/2023 using BiPAP 17/12 cm indicates basal SPO2 of 89.2% and less than 88% for 22.8 minutes of the night.  Recommend empirical pressure adjustment versus supplemental O2 use.  Reviewed with patient.    Currently using BIPAP @ 17/12cm H20 nightly; RESMED; device obtained 2022.  Compliance report 1/2/2023 through 1/31/2023 100 and compliance, average nightly 6 hours 24 minutes, minimal to significant mask leak with an overall AHI of 0.3/h.  He does have facial hair and his main complaint is his mask fit and ongoing dry mouth since the mask slight up on his face.  He is using a fullface mask he also tried different versions of masks that his sons use since they are on therapy.  He would like to have another mask fit performed.  He is due for supplies within the next month.  He would like to work on the fit of his mask and then repeat CNOX after having a better fitting mask to verify if changes in therapy need to be made.  He denies cardiac or respiratory symptoms today.  Has ongoing chronic pedal edema with compression stockings in place.    Sleep hx:  Patient has a history of sleep apnea and previously diagnosed in 2002 in [place].  He was initially started on CPAP in the early 2000's and switched to BiPAP in 2014.  He also underwent gastric sleeve in 2013 and lost 60 pounds.   Updated home sleep study 5/11/2022 noted severe sleep apnea with GIOVANI 68.0/h and O2 heidi of 58%.  Patient saturations were less than 90% for 82% of evaluation time.  The majority of events are obstructive apneas.  He underwent titration study 6/26/2022 noting best response to BiPAP 17/12 cm with reduced AHI of 1.9/h and O2 heidi of 86%.  He did achieve REM sleep in supine  position on this setting.  There was some noted nocturnal hypoxia during study but will further evaluate once he is acclimated therapy.      ROS: As per HPI and otherwise negative if not stated.    Past Medical History:   Diagnosis Date    Adrenal nodule ([facility]) 01/31/2022    Arthritis     Osteo-generalized    Chickenpox     Chronic kidney disease (CKD), stage III (moderate) ([facility]) 01/12/2017    Chronic lower back pain 06/23/2015    Chronic venous insufficiency 09/04/2014    Decreased hearing of right ear 10/01/2020    Dental disorder 12/17/2014    had two extracted last week 12/09/14=were infected     Dermatitis, seborrheic 06/23/2015     IMO load March 2020    GERD (gastroesophageal reflux disease)     Headache, classical migraine     HTN (hypertension) 09/04/2014    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/2018    Incisional hernia, without obstruction or gangrene 07/18/2017    Liver hemangioma 01/31/2022    Morbid obesity with BMI of 40.0-44.9, adult ([facility]) 09/04/2020    Seasonal allergies 09/04/2014    Sleep apnea     BiPAP    Tinnitus of both ears 10/13/2014       Past Surgical History:   Procedure Laterality Date    [place] LAP,DIAGNOSTIC ABDOMEN  8/7/2019    Procedure: LAPAROSCOPY-DIAGNOSTIC;  Surgeon: Julian Carmen M.D.;  Location: SURGERY [facility];  Service: General    LAPAROSCOPIC LYSIS OF ADHESIONS  8/7/2019    Procedure: LYSIS, ADHESIONS, LAPAROSCOPIC;  Surgeon: Julian Carmen M.D.;  Location: SURGERY [facility];  Service: General    INCISION HERNIA REPAIR  8/7/2019    Procedure: REPAIR, HERNIA, INCISIONAL- POSSIBLE;  Surgeon: Julian Carmen M.D.;  Location: SURGERY [facility];  Service: General    VENTRAL HERNIA REPAIR LAPAROSCOPIC  10/1/2018    Procedure: VENTRAL HERNIA REPAIR LAPAROSCOPIC- FOR INCISIONAL HERNIA WITH MESH;  Surgeon: Julian Carmen M.D.;  Location: SURGERY [facility];  Service: General    GASTRIC SLEEVE LAPAROSCOPY  12/29/2014    Performed by Julian Carmen,  M.D. at SURGERY [facility]S    KNEE REPLACEMENT, TOTAL  2012    left    OTHER ORTHOPEDIC SURGERY      bicep tendon repair    APPENDECTOMY      ARTHROSCOPY, KNEE      CHOLECYSTECTOMY      SLEEVE,ANA VASO THIGH      TONSILLECTOMY         Family History   Problem Relation Age of Onset    Diabetes Mother     Arthritis Mother     Lung Disease Father     Arthritis Father     Heart Disease Father     Prostate cancer Father     Hypertension Father     Alcohol/Drug Brother        Social History     Socioeconomic History    Marital status:      Spouse name: Not on file    Number of children: Not on file    Years of education: Not on file    Highest education level: Associate degree: occupational, technical, or vocational program   Occupational History    Not on file   Tobacco Use    Smoking status: Former     Packs/day: 1.00     Years: 25.00     Pack years: 25.00     Types: Cigarettes     Quit date: 1994     Years since quittin.1    Smokeless tobacco: Never   Vaping Use    Vaping Use: Never used   Substance and Sexual Activity    Alcohol use: Not Currently     Alcohol/week: 0.0 oz     Comment: one a month    Drug use: No    Sexual activity: Yes     Partners: Female   Other Topics Concern    Not on file   Social History Narrative    Not on file     Social Determinants of Health     Financial Resource Strain: Low Risk     Difficulty of Paying Living Expenses: Not hard at all   Food Insecurity: No Food Insecurity    Worried About Running Out of Food in the Last Year: Never true    Ran Out of Food in the Last Year: Never true   Transportation Needs: No Transportation Needs    Lack of Transportation (Medical): No    Lack of Transportation (Non-Medical): No   Physical Activity: Inactive    Days of Exercise per Week: 0 days    Minutes of Exercise per Session: 0 min   Stress: No Stress Concern Present    Feeling of Stress : Not at all   Social Connections: Socially Integrated    Frequency of Communication  with Friends and Family: More than three times a week    Frequency of Social Gatherings with Friends and Family: Twice a week    Attends Hinduism Services: More than 4 times per year    Active Member of Clubs or Organizations: Yes    Attends Club or Organization Meetings: Never    Marital Status:    Intimate Partner Violence: Not on file   Housing Stability: Low Risk     Unable to Pay for Housing in the Last Year: No    Number of Places Lived in the Last Year: 1    Unstable Housing in the Last Year: No       Allergies as of 02/01/2023    (No Known Allergies)        Vitals:  /72 (BP Location: Right arm, Patient Position: Sitting, BP Cuff Size: Large adult)   Pulse 79   Ht 1.829 m (6')   Wt (!) 138 kg (305 lb)   SpO2 99%     Current medications as of today   Current Outpatient Medications   Medication Sig Dispense Refill    triamterene-hctz (MAXZIDE-25/DYAZIDE) 37.5-25 MG Tab Take 1 Tablet by mouth every day. 90 Tablet 0    allopurinol (ZYLOPRIM) 300 MG Tab Take 1 Tablet by mouth every day. 90 Tablet 0    omeprazole (PRILOSEC) 20 MG delayed-release capsule Take 1 Capsule by mouth every day. 90 Capsule 0    allopurinol (ZYLOPRIM) 300 MG Tab Take 1 Tablet by mouth every day. 90 Tablet 3    omeprazole (PRILOSEC) 20 MG delayed-release capsule Take 1 Capsule by mouth every day. 90 Capsule 3    triamterene-hctz (MAXZIDE-25/DYAZIDE) 37.5-25 MG Tab Take 1 Tablet by mouth every day. 90 Tablet 3    triamcinolone acetonide (KENALOG) 0.1 % Ointment Apply 1 Dose topically 2 times a day. 15 g 3    ketoconazole (NIZORAL) 2 % shampoo Apply 5 mL topically 1 time a day as needed for Itching. 100 mL 1    tacrolimus (PROTOPIC) 0.1 % Ointment Apply 1 Dose topically 2 times a day. Indications: Psoriasis 30 g 2    amoxicillin (AMOXIL) 500 MG Cap TAKE 4 CAPS BY MOUTH before dentist appointment 30 capsule 0    fluticasone (FLONASE) 50 MCG/ACT nasal spray Administer 1 Spray into affected nostril(S) 2 times a day. 16 mL 0     multivitamin (THERAGRAN) Tab Take 1 Tab by mouth every day.      acetaminophen (TYLENOL) 500 MG Tab Take 1,000 mg by mouth every 6 hours as needed (MIGRAINE).      vitamin D (CHOLECALCIFEROL) 1000 UNIT Tab Take 2,000 Units by mouth every day.       No current facility-administered medications for this visit.         Physical Exam:   Gen:           Alert and oriented, No apparent distress. Mood and affect appropriate, normal interaction with examiner.  Eyes:          PERRL, EOM intact, sclere white, conjunctive moist.  Ears:          Not examined.   Hearing:     Grossly intact.  Nose:          Normal, no lesions or deformities.  Dentition:    mask  Oropharynx:   mask  Mallampati Classification: mask  Neck:        Supple, trachea midline, no masses.  Respiratory Effort: No intercostal retractions or use of accessory muscles.   Lung Auscultation:      Clear to auscultation bilaterally; no rales, rhonchi or wheezing.  CV:            Regular rate and rhythm. No murmurs, rubs or gallops.  Abd:           Not examined.   Lymphadenopathy: Not examined.  Gait and Station: Normal.  Digits and Nails: No clubbing, cyanosis, petechiae, or nodes.   Cranial Nerves: II-XII grossly intact.  Skin:        No rashes, lesions or ulcers noted.               Ext:           BLE compression stockings      Assessment:  1. Obstructive sleep apnea        2. Primary hypertension        3. BMI 40.0-44.9, adult (HCC)  HEIGHT AND WEIGHT      4. Former smoker            Immunizations:    Flu:recommend  Pneumovax 23:2020  Prevnar 13:2019  PCV 20: not due  COVID-19: 10/3/22    Plan:  DEDRA is well controlled on therapy but he continues to have mask fit issues.  We will pursue a mask fitting and then repeat CNOX to see if this improves his readings.  If it does not we will consider empirical pressure adjustment versus supplemental O2 bleed in.   DME mask/supplies; mask fit now... email sent to DME about concerns of mask   CNOX on pap after new mask with  good fit is obtained; patient will call to schedule this prior to next OV  Follow-up with primary care for ongoing management hypertension  Encourage weight loss through healthy eating and regular walking and gentle exercise  Follow-up in 3 months to review therapy, sooner if needed.    Please note that this dictation was created using voice recognition software. I have made every reasonable attempt to correct obvious errors, but it is possible there are errors of grammar and possibly content that I did not discover before finalizing the note.

## 2023-02-06 NOTE — PROCEDURES
OXIMETRY Interpretation:     This overnight oximetry was recorded on 01/18/23 with the patient on BiPAP 7 -12 no oxygen.  The analysis duration was 16hrs 15mins. 24 total oximetric events occurred encompassing 20.9 minutes .  The average event duration was  52.3 seconds .  The saturations were less than 90% for 65% of the recording.  The heidi saturation was 84% .  The patient spent 22.8 minutes with saturations < 88%.  Overall, this continuous nocturnal oximetry demonstrates inadequate oxygen saturation while on positive airway pressure therapy.      Recommendation:  supplemental oxygen is recommended.     Compliance should be reviewed for possible pressure changes. Otherwise supplemental oxygen at 2L per minute as a bleed in should be prescribed.     Clinical correlation is needed.

## 2023-02-08 ENCOUNTER — HOSPITAL ENCOUNTER (OUTPATIENT)
Dept: LAB | Facility: MEDICAL CENTER | Age: 68
End: 2023-02-08
Attending: NURSE PRACTITIONER
Payer: MEDICARE

## 2023-02-08 ENCOUNTER — HOSPITAL ENCOUNTER (OUTPATIENT)
Dept: LAB | Facility: MEDICAL CENTER | Age: 68
End: 2023-02-08
Attending: UROLOGY
Payer: MEDICARE

## 2023-02-08 DIAGNOSIS — R73.01 IFG (IMPAIRED FASTING GLUCOSE): ICD-10-CM

## 2023-02-08 LAB
EST. AVERAGE GLUCOSE BLD GHB EST-MCNC: 120 MG/DL
HBA1C MFR BLD: 5.8 % (ref 4–5.6)

## 2023-02-08 PROCEDURE — 36415 COLL VENOUS BLD VENIPUNCTURE: CPT

## 2023-02-08 PROCEDURE — 84154 ASSAY OF PSA FREE: CPT

## 2023-02-08 PROCEDURE — 84153 ASSAY OF PSA TOTAL: CPT

## 2023-02-08 PROCEDURE — 83036 HEMOGLOBIN GLYCOSYLATED A1C: CPT

## 2023-02-10 LAB
PSA FREE MFR SERPL: 29 %
PSA FREE SERPL-MCNC: 1.4 NG/ML
PSA SERPL-MCNC: 4.9 NG/ML (ref 0–4)

## 2023-02-15 ENCOUNTER — APPOINTMENT (OUTPATIENT)
Dept: MEDICAL GROUP | Facility: MEDICAL CENTER | Age: 68
End: 2023-02-15
Payer: MEDICARE

## 2023-03-06 ENCOUNTER — HOSPITAL ENCOUNTER (OUTPATIENT)
Dept: LAB | Facility: MEDICAL CENTER | Age: 68
End: 2023-03-06
Attending: STUDENT IN AN ORGANIZED HEALTH CARE EDUCATION/TRAINING PROGRAM
Payer: MEDICARE

## 2023-03-06 LAB — PSA SERPL-MCNC: 4.05 NG/ML (ref 0–4)

## 2023-03-06 PROCEDURE — 36415 COLL VENOUS BLD VENIPUNCTURE: CPT

## 2023-03-06 PROCEDURE — 84153 ASSAY OF PSA TOTAL: CPT

## 2023-03-06 PROCEDURE — 81539 ONCOLOGY PROSTATE PROB SCORE: CPT

## 2023-03-09 LAB
4K - PSA, FREE Q5895: 1.24 NG/ML
4K - PSA, TOTAL Q5894: 4.05 NG/ML
4K - SCORE Q5892: 11.8
4K -PSA, PERCENT FREE Q5896: 31 %

## 2023-03-12 DIAGNOSIS — L98.9 SKIN LESION OF FACE: ICD-10-CM

## 2023-03-12 DIAGNOSIS — Z12.83 SCREENING EXAM FOR SKIN CANCER: ICD-10-CM

## 2023-03-12 DIAGNOSIS — E27.8 ADRENAL NODULE (HCC): ICD-10-CM

## 2023-03-12 DIAGNOSIS — D18.03 LIVER HEMANGIOMA: ICD-10-CM

## 2023-03-12 DIAGNOSIS — D17.71 RENAL ANGIOMYOLIPOMA: ICD-10-CM

## 2023-03-16 ENCOUNTER — OFFICE VISIT (OUTPATIENT)
Dept: DERMATOLOGY | Facility: IMAGING CENTER | Age: 68
End: 2023-03-16
Payer: MEDICARE

## 2023-03-16 DIAGNOSIS — L72.3 SEBACEOUS CYST: ICD-10-CM

## 2023-03-16 DIAGNOSIS — L21.9 SEBORRHEA: ICD-10-CM

## 2023-03-16 DIAGNOSIS — L08.9 SKIN INFECTION: ICD-10-CM

## 2023-03-16 PROCEDURE — 99204 OFFICE O/P NEW MOD 45 MIN: CPT | Performed by: DERMATOLOGY

## 2023-03-16 RX ORDER — CLOBETASOL PROPIONATE 0.46 MG/ML
SOLUTION TOPICAL
Qty: 50 ML | Refills: 2 | Status: SHIPPED | OUTPATIENT
Start: 2023-03-16 | End: 2023-05-02

## 2023-03-16 RX ORDER — ZOLPIDEM TARTRATE 5 MG/1
TABLET ORAL
COMMUNITY
End: 2023-08-16

## 2023-03-16 RX ORDER — DOXYCYCLINE HYCLATE 100 MG
TABLET ORAL
Qty: 60 TABLET | Refills: 0 | Status: SHIPPED | OUTPATIENT
Start: 2023-03-16 | End: 2023-05-02

## 2023-03-16 NOTE — PROGRESS NOTES
CC: Cyst like on left cheek    Subjective: New Patient here for a cyst like spot on left cheek. First grey green liquid came out when it was popped. Then hard gunk came out and blood.  Rubbed by CPAP    Pt also has some flakiness on scalp, beard and eyebrows. Was using Sodium Sulfatemide but it got too expensive and it did not completely improve.Pt was prescribed Ketoconazole shampoo and it hasn't made full improvement.  Tacrolimus for beard. It doesn't get rid of the flaky but does help with itching.  Triamcinolone has helped on the eyebrows but not on the michelle  Pt has tried every OTC shampoo for dandruff      ROS: no fevers/chills. +itch.  No cough  Relevant PMH: kidney dz, morbid obesity. Hx gastric sleeve with weight regained  Social: FS    PE: Gen:WDWN male in NAD. Skin: scalp scaling and erythema. Eyebrows/beard appearing spared. Left cheek - open punctum with surrounding indurated skin. Not appearing fluctuant.  Minimal erythema Cell phone photos showing discharge - keratinaceous and bloody in appearance    A/P: hx of cyst/STI, improving with home self-drainage: face  -advised re: use of warm compresses  -doxycycline 100mg PO BID, se reviewed  -consider future procedure - I&D, vs punch/excision dept on effects of trx     seborrhea,scalp: chronic worsening  -counseled re: dx/tx  -antidandruff shampoos - pick 2 and alternate Q3-4 days   -clobetasol solution Qday-BID--> PRN    Seborrhea, face: chronic worsening  -HCT/Lotrimin cream BID PRN  -cont home supply protopic  0.1% oint BID PRN itching  -antidandruff shampoos - pick 2 and alternate every 3-4 days    F/u 1 months    I have reviewed medications relevant to my specialty.

## 2023-03-17 ENCOUNTER — HOSPITAL ENCOUNTER (OUTPATIENT)
Dept: LAB | Facility: MEDICAL CENTER | Age: 68
End: 2023-03-17
Attending: NURSE PRACTITIONER
Payer: MEDICARE

## 2023-03-17 DIAGNOSIS — D17.71 RENAL ANGIOMYOLIPOMA: ICD-10-CM

## 2023-03-17 DIAGNOSIS — D18.03 LIVER HEMANGIOMA: ICD-10-CM

## 2023-03-17 DIAGNOSIS — E27.8 ADRENAL NODULE (HCC): ICD-10-CM

## 2023-03-17 LAB
ANION GAP SERPL CALC-SCNC: 12 MMOL/L (ref 7–16)
BUN SERPL-MCNC: 26 MG/DL (ref 8–22)
CALCIUM SERPL-MCNC: 9.5 MG/DL (ref 8.5–10.5)
CHLORIDE SERPL-SCNC: 104 MMOL/L (ref 96–112)
CO2 SERPL-SCNC: 24 MMOL/L (ref 20–33)
CREAT SERPL-MCNC: 1.22 MG/DL (ref 0.5–1.4)
GFR SERPLBLD CREATININE-BSD FMLA CKD-EPI: 64 ML/MIN/1.73 M 2
GLUCOSE SERPL-MCNC: 100 MG/DL (ref 65–99)
POTASSIUM SERPL-SCNC: 4.5 MMOL/L (ref 3.6–5.5)
SODIUM SERPL-SCNC: 140 MMOL/L (ref 135–145)

## 2023-03-17 PROCEDURE — 36415 COLL VENOUS BLD VENIPUNCTURE: CPT

## 2023-03-17 PROCEDURE — 80048 BASIC METABOLIC PNL TOTAL CA: CPT

## 2023-03-21 ENCOUNTER — HOSPITAL ENCOUNTER (OUTPATIENT)
Dept: RADIOLOGY | Facility: MEDICAL CENTER | Age: 68
End: 2023-03-21
Attending: NURSE PRACTITIONER
Payer: MEDICARE

## 2023-03-21 DIAGNOSIS — D17.71 RENAL ANGIOMYOLIPOMA: ICD-10-CM

## 2023-03-21 DIAGNOSIS — D18.03 LIVER HEMANGIOMA: ICD-10-CM

## 2023-03-21 DIAGNOSIS — E27.8 ADRENAL NODULE (HCC): ICD-10-CM

## 2023-03-21 PROCEDURE — 700117 HCHG RX CONTRAST REV CODE 255: Performed by: NURSE PRACTITIONER

## 2023-03-21 PROCEDURE — 74177 CT ABD & PELVIS W/CONTRAST: CPT

## 2023-03-21 RX ADMIN — IOHEXOL 25 ML: 240 INJECTION, SOLUTION INTRATHECAL; INTRAVASCULAR; INTRAVENOUS; ORAL at 10:12

## 2023-03-21 RX ADMIN — IOHEXOL 100 ML: 350 INJECTION, SOLUTION INTRAVENOUS at 10:12

## 2023-03-31 ENCOUNTER — HOME STUDY (OUTPATIENT)
Dept: SLEEP MEDICINE | Facility: MEDICAL CENTER | Age: 68
End: 2023-03-31
Attending: NURSE PRACTITIONER
Payer: MEDICARE

## 2023-03-31 DIAGNOSIS — G47.34 NOCTURNAL HYPOXIA: ICD-10-CM

## 2023-03-31 DIAGNOSIS — G47.33 OBSTRUCTIVE SLEEP APNEA: ICD-10-CM

## 2023-03-31 PROCEDURE — 94762 N-INVAS EAR/PLS OXIMTRY CONT: CPT | Performed by: INTERNAL MEDICINE

## 2023-04-03 DIAGNOSIS — G47.33 OBSTRUCTIVE SLEEP APNEA: ICD-10-CM

## 2023-04-03 DIAGNOSIS — G47.34 NOCTURNAL HYPOXIA: ICD-10-CM

## 2023-04-03 NOTE — PROCEDURES
Interpretation:    This overnight oximetry was recorded on 4/2/2023 with the patient on BiPAP 17/12 CWP.  The analysis duration was 7 hours 20 minutes.  27 total oximetric events occurred encompassing 19.2 minutes .  The average event duration was 42.7 seconds .  The saturations were less than 90% for 74.3% of the recording.  The heidi saturation was 82% .  The patient spent 117.6 minutes with saturations < 88%.  Overall, this continuous nocturnal oximetry demonstrates significant nocturnal desaturation while on positive airway pressure therapy.      Recommendation:    Clinical correlation is needed.  The patient may be a candidate for a change in BiPAP settings and or the addition of nocturnal bleed-in supplemental oxygen.

## 2023-04-13 ENCOUNTER — OFFICE VISIT (OUTPATIENT)
Dept: DERMATOLOGY | Facility: IMAGING CENTER | Age: 68
End: 2023-04-13
Payer: MEDICARE

## 2023-04-13 DIAGNOSIS — L21.9 SEBORRHEA: ICD-10-CM

## 2023-04-13 DIAGNOSIS — L72.3 SEBACEOUS CYST: ICD-10-CM

## 2023-04-13 PROCEDURE — 99213 OFFICE O/P EST LOW 20 MIN: CPT | Performed by: DERMATOLOGY

## 2023-04-19 ENCOUNTER — OFFICE VISIT (OUTPATIENT)
Dept: NEPHROLOGY | Facility: MEDICAL CENTER | Age: 68
End: 2023-04-19
Payer: MEDICARE

## 2023-04-19 VITALS
TEMPERATURE: 97.4 F | HEIGHT: 73 IN | SYSTOLIC BLOOD PRESSURE: 132 MMHG | BODY MASS INDEX: 40.69 KG/M2 | OXYGEN SATURATION: 98 % | HEART RATE: 63 BPM | DIASTOLIC BLOOD PRESSURE: 80 MMHG | WEIGHT: 307 LBS

## 2023-04-19 DIAGNOSIS — E66.01 MORBID OBESITY WITH BMI OF 40.0-44.9, ADULT (HCC): ICD-10-CM

## 2023-04-19 DIAGNOSIS — N18.9 CHRONIC KIDNEY DISEASE, UNSPECIFIED CKD STAGE: ICD-10-CM

## 2023-04-19 DIAGNOSIS — I10 PRIMARY HYPERTENSION: ICD-10-CM

## 2023-04-19 DIAGNOSIS — N28.1 RENAL CYST: ICD-10-CM

## 2023-04-19 DIAGNOSIS — G47.33 OSA (OBSTRUCTIVE SLEEP APNEA): ICD-10-CM

## 2023-04-19 PROCEDURE — 99205 OFFICE O/P NEW HI 60 MIN: CPT | Performed by: INTERNAL MEDICINE

## 2023-04-19 ASSESSMENT — ENCOUNTER SYMPTOMS
NAUSEA: 0
COUGH: 0
SHORTNESS OF BREATH: 0
HYPERTENSION: 1
VOMITING: 0
CHILLS: 0
FEVER: 0

## 2023-04-19 ASSESSMENT — FIBROSIS 4 INDEX: FIB4 SCORE: 1.46

## 2023-04-19 NOTE — PROGRESS NOTES
"Subjective     Max Alberto is a 67 y.o. male who presents with Chronic Kidney Disease and Hypertension            The patient is a pleasant 67-year-old gentleman with a past medical history significant for longstanding hypertension, history of bilateral renal cysts initially diagnosed in 2019 on abdominal CT scan, patient had a follow-up MRI in February 2022 and the recent abdominal CT scan on March 2023  Overall all the test suggest simple bilateral renal cysts, also angiomyolipoma.  No need for further follow-up as per radiology report.  Kidney function has been preserved, no significant proteinuria.    Chronic Kidney Disease  This is a chronic problem. The current episode started more than 1 year ago. The problem occurs constantly. The problem has been unchanged. Pertinent negatives include no chest pain, chills, coughing, fever, nausea, urinary symptoms or vomiting.   Hypertension  This is a chronic problem. The current episode started more than 1 year ago. The problem is unchanged. The problem is controlled. Pertinent negatives include no chest pain, malaise/fatigue, peripheral edema or shortness of breath. Risk factors for coronary artery disease include male gender and obesity. Past treatments include diuretics. The current treatment provides significant improvement. There are no compliance problems.      Review of Systems   Constitutional:  Negative for chills, fever and malaise/fatigue.   Respiratory:  Negative for cough and shortness of breath.    Cardiovascular:  Negative for chest pain and leg swelling.   Gastrointestinal:  Negative for nausea and vomiting.   Genitourinary:  Negative for dysuria, frequency and urgency.            Objective     /80 (BP Location: Right arm, Patient Position: Sitting, BP Cuff Size: Adult)   Pulse 63   Temp 36.3 °C (97.4 °F) (Temporal)   Ht 1.854 m (6' 1\")   Wt (!) 139 kg (307 lb)   SpO2 98%   BMI 40.50 kg/m²      Physical Exam  Vitals and nursing " note reviewed.   Constitutional:       General: He is not in acute distress.     Appearance: He is not ill-appearing.   HENT:      Head: Normocephalic and atraumatic.      Right Ear: External ear normal.      Left Ear: External ear normal.      Nose: Nose normal.   Eyes:      General:         Right eye: No discharge.         Left eye: No discharge.      Conjunctiva/sclera: Conjunctivae normal.   Cardiovascular:      Rate and Rhythm: Normal rate and regular rhythm.      Heart sounds: No murmur heard.  Pulmonary:      Effort: Pulmonary effort is normal. No respiratory distress.      Breath sounds: Normal breath sounds. No wheezing.   Musculoskeletal:         General: No tenderness or deformity.      Right lower leg: No edema.      Left lower leg: No edema.   Skin:     General: Skin is warm.   Neurological:      General: No focal deficit present.      Mental Status: He is alert and oriented to person, place, and time.   Psychiatric:         Mood and Affect: Mood normal.         Behavior: Behavior normal.     Past Medical History:   Diagnosis Date    Adrenal nodule ([facility]) 01/31/2022    Arthritis     Osteo-generalized    Chickenpox     Chronic kidney disease (CKD), stage III (moderate) ([facility]) 01/12/2017    Chronic lower back pain 06/23/2015    Chronic venous insufficiency 09/04/2014    Decreased hearing of right ear 10/01/2020    Dental disorder 12/17/2014    had two extracted last week 12/09/14=were infected     Dermatitis, seborrheic 06/23/2015     IMO load March 2020    GERD (gastroesophageal reflux disease)     Headache, classical migraine     HTN (hypertension) 09/04/2014    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/2018    Incisional hernia, without obstruction or gangrene 07/18/2017    Liver hemangioma 01/31/2022    Morbid obesity with BMI of 40.0-44.9, adult ([facility]) 09/04/2020    Seasonal allergies 09/04/2014    Sleep apnea     BiPAP    Tinnitus of both ears 10/13/2014     Social History      Socioeconomic History    Marital status:      Spouse name: Not on file    Number of children: Not on file    Years of education: Not on file    Highest education level: Associate degree: occupational, technical, or vocational program   Occupational History    Not on file   Tobacco Use    Smoking status: Former     Packs/day: 1.00     Years: 25.00     Pack years: 25.00     Types: Cigarettes     Quit date: 1994     Years since quittin.3    Smokeless tobacco: Never   Vaping Use    Vaping Use: Never used   Substance and Sexual Activity    Alcohol use: Not Currently     Alcohol/week: 0.0 oz     Comment: one a month    Drug use: No    Sexual activity: Yes     Partners: Female   Other Topics Concern    Not on file   Social History Narrative    Not on file     Social Determinants of Health     Financial Resource Strain: Low Risk     Difficulty of Paying Living Expenses: Not hard at all   Food Insecurity: No Food Insecurity    Worried About Running Out of Food in the Last Year: Never true    Ran Out of Food in the Last Year: Never true   Transportation Needs: No Transportation Needs    Lack of Transportation (Medical): No    Lack of Transportation (Non-Medical): No   Physical Activity: Inactive    Days of Exercise per Week: 0 days    Minutes of Exercise per Session: 0 min   Stress: No Stress Concern Present    Feeling of Stress : Not at all   Social Connections: Socially Integrated    Frequency of Communication with Friends and Family: More than three times a week    Frequency of Social Gatherings with Friends and Family: Twice a week    Attends Religion Services: More than 4 times per year    Active Member of Clubs or Organizations: Yes    Attends Club or Organization Meetings: Never    Marital Status:    Intimate Partner Violence: Not on file   Housing Stability: Low Risk     Unable to Pay for Housing in the Last Year: No    Number of Places Lived in the Last Year: 1    Unstable Housing in the  Last Year: No     Family History   Problem Relation Age of Onset    Diabetes Mother     Arthritis Mother     Lung Disease Father     Arthritis Father     Heart Disease Father     Prostate cancer Father     Hypertension Father     Alcohol/Drug Brother      Recent Labs     08/04/22  0621 03/17/23  0745   ALBUMIN 4.2  --    HDL 41  --    TRIGLYCERIDE 115  --    SODIUM 143 140   POTASSIUM 4.1 4.5   CHLORIDE 108 104   CO2 23 24   BUN 23* 26*   CREATININE 1.27 1.22     I have reviewed the abdominal CT scan and MRI images with the patient                        Assessment & Plan        1. Primary hypertension  Controlled  Continue same medication regimen  Continue low-sodium diet      2. Chronic kidney disease, unspecified CKD stage  Secondary to bilateral renal cysts, kidney function has been preserved  No uremic symptoms  Avoid nephrotoxins  Check labs including kidney function test and microalbumin to creatinine ratio annually  Patient was advised to call us if symptoms worsen  Follow-up in the office on as-needed basis    3. Renal cyst  Most likely simple cysts  No need for further investigation or follow-up    4. Morbid obesity with BMI of 40.0-44.9, adult (HCC)  Weight management  Consider a trial of Ozempic as patient is prediabetic    5. DEDRA (obstructive sleep apnea)    Over 50% of this 60 minute visit was spent on counseling and coordination of care regarding diet, side effects, and complication.

## 2023-05-02 ENCOUNTER — OFFICE VISIT (OUTPATIENT)
Dept: SLEEP MEDICINE | Facility: MEDICAL CENTER | Age: 68
End: 2023-05-02
Attending: NURSE PRACTITIONER
Payer: MEDICARE

## 2023-05-02 VITALS
HEART RATE: 90 BPM | OXYGEN SATURATION: 96 % | SYSTOLIC BLOOD PRESSURE: 130 MMHG | HEIGHT: 72 IN | WEIGHT: 309 LBS | DIASTOLIC BLOOD PRESSURE: 70 MMHG | BODY MASS INDEX: 41.85 KG/M2 | RESPIRATION RATE: 16 BRPM

## 2023-05-02 DIAGNOSIS — Z87.891 FORMER SMOKER: ICD-10-CM

## 2023-05-02 DIAGNOSIS — I10 PRIMARY HYPERTENSION: ICD-10-CM

## 2023-05-02 DIAGNOSIS — E66.01 MORBID OBESITY WITH BMI OF 40.0-44.9, ADULT (HCC): ICD-10-CM

## 2023-05-02 DIAGNOSIS — G47.34 NOCTURNAL HYPOXEMIA: ICD-10-CM

## 2023-05-02 DIAGNOSIS — G47.33 OBSTRUCTIVE SLEEP APNEA: Chronic | ICD-10-CM

## 2023-05-02 PROCEDURE — 99212 OFFICE O/P EST SF 10 MIN: CPT | Performed by: NURSE PRACTITIONER

## 2023-05-02 PROCEDURE — 99215 OFFICE O/P EST HI 40 MIN: CPT | Performed by: NURSE PRACTITIONER

## 2023-05-02 ASSESSMENT — FIBROSIS 4 INDEX: FIB4 SCORE: 1.46

## 2023-05-02 NOTE — PATIENT INSTRUCTIONS
DME: [facility]  [address] [place] [address]  #[phone]  To check on cost of oxygen concentrator    Try breathe right strips and saline nasal spray prior to bedtime to help keep nose open or MAX AIR nasal cones; continue saline based nasal lubricant or saline spray

## 2023-05-02 NOTE — PROGRESS NOTES
Chief Complaint   Patient presents with    Follow-Up     Apnea // last seen 2/1/2023    Results     OPO 3/31/2023        HPI:  Max Alberto is a 67 y.o. year old male here today for follow-up on DEDRA.  Last OV 2/1/23     CNOX results 3/31/2023 using BiPAP 17/12 cm indicated saturations less than 90% for 74.3% of recording time, O2 heidi 82%, 117.6 minutes less than 88% ox saturation.  Recommendation was starting bleed in O2 on therapy and 2 L/min O2 ordered 4/3/2023.  He obtained oxygen but he would like to purchase his own concentrator out-of-pocket.  He is requesting a copy of his prescription.    Currently using BIPAP @ 17/12cm H20 nightly; RESMED; device obtained 2022.  He also brought his replacement device from recall that he would like to verify settings are appropriate.  Compliance report using [employer]Med device 4/2/2023 through 5/1/2023 indicates 29 nights of use or 97% compliance, average nightly use 5 hours 55 minutes, minimal mask leak with an overall AHI of 0.2/h.  Reviewed with patient.  He tolerates therapy well.  He denies morning headaches.  He feels overall he sleeps on therapy without complaints.    He feels he breathes well during the day but does have some nasal collapse at night.  Reviewed use of Breathe Right strips or nasal cones to see if this further helps.    Sleep hx:  Patient has a history of sleep apnea and previously diagnosed in 2002 in [place].  He was initially started on CPAP in the early 2000's and switched to BiPAP in 2014.  He also underwent gastric sleeve in 2013 and lost 60 pounds.   Updated home sleep study 5/11/2022 noted severe sleep apnea with GIOVANI 68.0/h and O2 heidi of 58%.  Patient saturations were less than 90% for 82% of evaluation time.  The majority of events are obstructive apneas.  He underwent titration study 6/26/2022 noting best response to BiPAP 17/12 cm with reduced AHI of 1.9/h and O2 heidi of 86%.  He did achieve REM sleep in supine position on this  setting.  There was some noted nocturnal hypoxia during study but will further evaluate once he is acclimated therapy.      ROS: As per HPI and otherwise negative if not stated.    Past Medical History:   Diagnosis Date    Adrenal nodule (HCC) 01/31/2022    Arthritis     Osteo-generalized    Chickenpox     Chronic kidney disease (CKD), stage III (moderate) (HCC) 01/12/2017    Chronic lower back pain 06/23/2015    Chronic venous insufficiency 09/04/2014    Decreased hearing of right ear 10/01/2020    Dental disorder 12/17/2014    had two extracted last week 12/09/14=were infected     Dermatitis, seborrheic 06/23/2015     IMO load March 2020    GERD (gastroesophageal reflux disease)     Headache, classical migraine     HTN (hypertension) 09/04/2014    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/2018    Incisional hernia, without obstruction or gangrene 07/18/2017    Liver hemangioma 01/31/2022    Morbid obesity with BMI of 40.0-44.9, adult ([facility]) 09/04/2020    Seasonal allergies 09/04/2014    Sleep apnea     BiPAP    Tinnitus of both ears 10/13/2014       Past Surgical History:   Procedure Laterality Date    LA LAP,DIAGNOSTIC ABDOMEN  8/7/2019    Procedure: LAPAROSCOPY-DIAGNOSTIC;  Surgeon: Julian Carmen M.D.;  Location: SURGERY [facility];  Service: General    LAPAROSCOPIC LYSIS OF ADHESIONS  8/7/2019    Procedure: LYSIS, ADHESIONS, LAPAROSCOPIC;  Surgeon: Julian Carmen M.D.;  Location: SURGERY [facility];  Service: General    INCISION HERNIA REPAIR  8/7/2019    Procedure: REPAIR, HERNIA, INCISIONAL- POSSIBLE;  Surgeon: Julian Carmen M.D.;  Location: SURGERY [facility];  Service: General    VENTRAL HERNIA REPAIR LAPAROSCOPIC  10/1/2018    Procedure: VENTRAL HERNIA REPAIR LAPAROSCOPIC- FOR INCISIONAL HERNIA WITH MESH;  Surgeon: Julian Carmen M.D.;  Location: SURGERY [facility];  Service: General    GASTRIC SLEEVE LAPAROSCOPY  12/29/2014    Performed by Julian Carmen M.D. at SURGERY  [facility]    KNEE REPLACEMENT, TOTAL  2012    left    OTHER ORTHOPEDIC SURGERY      bicep tendon repair    APPENDECTOMY      ARTHROSCOPY, KNEE      CHOLECYSTECTOMY      SLEEVE,ANA VASO THIGH      TONSILLECTOMY         Family History   Problem Relation Age of Onset    Diabetes Mother     Arthritis Mother     Lung Disease Father     Arthritis Father     Heart Disease Father     Prostate cancer Father     Hypertension Father     Alcohol/Drug Brother        Social History     Socioeconomic History    Marital status:      Spouse name: Not on file    Number of children: Not on file    Years of education: Not on file    Highest education level: Associate degree: occupational, technical, or vocational program   Occupational History    Not on file   Tobacco Use    Smoking status: Former     Packs/day: 1.00     Years: 25.00     Pack years: 25.00     Types: Cigarettes     Quit date: 1994     Years since quittin.3    Smokeless tobacco: Never   Vaping Use    Vaping Use: Never used   Substance and Sexual Activity    Alcohol use: Not Currently     Alcohol/week: 0.0 oz     Comment: one a month    Drug use: No    Sexual activity: Yes     Partners: Female   Other Topics Concern    Not on file   Social History Narrative    Not on file     Social Determinants of Health     Financial Resource Strain: Low Risk     Difficulty of Paying Living Expenses: Not hard at all   Food Insecurity: No Food Insecurity    Worried About Running Out of Food in the Last Year: Never true    Ran Out of Food in the Last Year: Never true   Transportation Needs: No Transportation Needs    Lack of Transportation (Medical): No    Lack of Transportation (Non-Medical): No   Physical Activity: Inactive    Days of Exercise per Week: 0 days    Minutes of Exercise per Session: 0 min   Stress: No Stress Concern Present    Feeling of Stress : Not at all   Social Connections: Socially Integrated    Frequency of Communication with Friends and  Family: More than three times a week    Frequency of Social Gatherings with Friends and Family: Twice a week    Attends Rastafari Services: More than 4 times per year    Active Member of Clubs or Organizations: Yes    Attends Club or Organization Meetings: Never    Marital Status:    Intimate Partner Violence: Not on file   Housing Stability: Low Risk     Unable to Pay for Housing in the Last Year: No    Number of Places Lived in the Last Year: 1    Unstable Housing in the Last Year: No       Allergies as of 05/02/2023    (No Known Allergies)        Vitals:  /70 (BP Location: Left arm, Patient Position: Sitting, BP Cuff Size: Adult)   Pulse 90   Resp 16   Ht 1.829 m (6')   Wt (!) 140 kg (309 lb)   SpO2 96%     Current medications as of today   Current Outpatient Medications   Medication Sig Dispense Refill    zolpidem (AMBIEN) 5 MG Tab zolpidem 5 mg tablet   PLEASE SEE ATTACHED FOR DETAILED DIRECTIONS      allopurinol (ZYLOPRIM) 300 MG Tab Take 1 Tablet by mouth every day. 90 Tablet 3    omeprazole (PRILOSEC) 20 MG delayed-release capsule Take 1 Capsule by mouth every day. 90 Capsule 3    triamterene-hctz (MAXZIDE-25/DYAZIDE) 37.5-25 MG Tab Take 1 Tablet by mouth every day. 90 Tablet 3    triamcinolone acetonide (KENALOG) 0.1 % Ointment Apply 1 Dose topically 2 times a day. 15 g 3    amoxicillin (AMOXIL) 500 MG Cap TAKE 4 CAPS BY MOUTH before dentist appointment 30 capsule 0    fluticasone (FLONASE) 50 MCG/ACT nasal spray Administer 1 Spray into affected nostril(S) 2 times a day. 16 mL 0    multivitamin (THERAGRAN) Tab Take 1 Tab by mouth every day.      acetaminophen (TYLENOL) 500 MG Tab Take 1,000 mg by mouth every 6 hours as needed (MIGRAINE).      vitamin D (CHOLECALCIFEROL) 1000 UNIT Tab Take 2,000 Units by mouth every day.       No current facility-administered medications for this visit.         Physical Exam:   Gen:           Alert and oriented, No apparent distress. Mood and affect  appropriate, normal interaction with examiner.  Eyes:          PERRL, EOM intact, sclere white, conjunctive moist.  Ears:          Not examined.   Hearing:     Grossly intact.  Nose:          Normal, no lesions or deformities.  Dentition:    Not examined.   Oropharynx:   Not examined.   Mallampati Classification: Not examined.   Neck:        Supple, trachea midline, no masses.  Respiratory Effort: No intercostal retractions or use of accessory muscles.   Lung Auscultation:      Clear to auscultation bilaterally; no rales, rhonchi or wheezing.  CV:            Regular rate and rhythm. No murmurs, rubs or gallops.  Abd:           Not examined.   Lymphadenopathy: Not examined.  Gait and Station: Normal.  Digits and Nails: No clubbing, cyanosis, petechiae, or nodes.   Cranial Nerves: II-XII grossly intact.  Skin:        No rashes, lesions or ulcers noted.               Ext:           No cyanosis or edema.      Assessment:  1. Obstructive sleep apnea        2. Nocturnal hypoxemia        3. Primary hypertension        4. Morbid obesity with BMI of 40.0-44.9, adult (HCC)  HEIGHT AND WEIGHT      5. Former smoker            Immunizations:    Flu:11/20/22  Pneumovax 23:2020  Prevnar 13:2019  PCV 20: not due  COVID-19: 10/3/22    Plan:  DEDRA is well controlled on therapy.  Continue BiPAP 17/12 cm on a nightly basis.  Rx for O2 prescription to obtain O2 concentrator pocket.   DME mask/supplies  Continue bleed in O2 at 2 L/min at night.  Follow-up with cardiology and primary care for management hypertension  Encourage weight loss or healthy eating regular activity  Follow-up in 1 year with compliance report, sooner if needed.    Please note that this dictation was created using voice recognition software. I have made every reasonable attempt to correct obvious errors, but it is possible there are errors of grammar and possibly content that I did not discover before finalizing the note.

## 2023-05-22 ENCOUNTER — TELEPHONE (OUTPATIENT)
Dept: HEALTH INFORMATION MANAGEMENT | Facility: OTHER | Age: 68
End: 2023-05-22
Payer: MEDICARE

## 2023-05-29 DIAGNOSIS — D50.9 IRON DEFICIENCY ANEMIA, UNSPECIFIED IRON DEFICIENCY ANEMIA TYPE: ICD-10-CM

## 2023-06-05 ENCOUNTER — HOSPITAL ENCOUNTER (OUTPATIENT)
Dept: LAB | Facility: MEDICAL CENTER | Age: 68
End: 2023-06-05
Attending: NURSE PRACTITIONER
Payer: MEDICARE

## 2023-06-05 ENCOUNTER — HOSPITAL ENCOUNTER (OUTPATIENT)
Dept: LAB | Facility: MEDICAL CENTER | Age: 68
End: 2023-06-05
Attending: INTERNAL MEDICINE
Payer: MEDICARE

## 2023-06-05 DIAGNOSIS — N18.9 CHRONIC KIDNEY DISEASE, UNSPECIFIED CKD STAGE: ICD-10-CM

## 2023-06-05 DIAGNOSIS — I10 PRIMARY HYPERTENSION: ICD-10-CM

## 2023-06-05 DIAGNOSIS — D50.9 IRON DEFICIENCY ANEMIA, UNSPECIFIED IRON DEFICIENCY ANEMIA TYPE: ICD-10-CM

## 2023-06-05 LAB
ANION GAP SERPL CALC-SCNC: 15 MMOL/L (ref 7–16)
BASOPHILS # BLD AUTO: 0.4 % (ref 0–1.8)
BASOPHILS # BLD: 0.03 K/UL (ref 0–0.12)
BUN SERPL-MCNC: 28 MG/DL (ref 8–22)
CALCIUM SERPL-MCNC: 9.5 MG/DL (ref 8.5–10.5)
CHLORIDE SERPL-SCNC: 105 MMOL/L (ref 96–112)
CO2 SERPL-SCNC: 23 MMOL/L (ref 20–33)
CREAT SERPL-MCNC: 1.18 MG/DL (ref 0.5–1.4)
CREAT UR-MCNC: 91.32 MG/DL
EOSINOPHIL # BLD AUTO: 0.16 K/UL (ref 0–0.51)
EOSINOPHIL NFR BLD: 2 % (ref 0–6.9)
ERYTHROCYTE [DISTWIDTH] IN BLOOD BY AUTOMATED COUNT: 57.5 FL (ref 35.9–50)
ERYTHROCYTE [DISTWIDTH] IN BLOOD BY AUTOMATED COUNT: 57.7 FL (ref 35.9–50)
FERRITIN SERPL-MCNC: 35.3 NG/ML (ref 22–322)
GFR SERPLBLD CREATININE-BSD FMLA CKD-EPI: 67 ML/MIN/1.73 M 2
GLUCOSE SERPL-MCNC: 97 MG/DL (ref 65–99)
HCT VFR BLD AUTO: 49.8 % (ref 42–52)
HCT VFR BLD AUTO: 50.2 % (ref 42–52)
HGB BLD-MCNC: 16.3 G/DL (ref 14–18)
HGB BLD-MCNC: 16.3 G/DL (ref 14–18)
IMM GRANULOCYTES # BLD AUTO: 0.01 K/UL (ref 0–0.11)
IMM GRANULOCYTES NFR BLD AUTO: 0.1 % (ref 0–0.9)
IRON SATN MFR SERPL: 41 % (ref 15–55)
IRON SERPL-MCNC: 119 UG/DL (ref 50–180)
LYMPHOCYTES # BLD AUTO: 2.46 K/UL (ref 1–4.8)
LYMPHOCYTES NFR BLD: 30.5 % (ref 22–41)
MCH RBC QN AUTO: 29.7 PG (ref 27–33)
MCH RBC QN AUTO: 29.7 PG (ref 27–33)
MCHC RBC AUTO-ENTMCNC: 32.5 G/DL (ref 32.3–36.5)
MCHC RBC AUTO-ENTMCNC: 32.7 G/DL (ref 32.3–36.5)
MCV RBC AUTO: 90.7 FL (ref 81.4–97.8)
MCV RBC AUTO: 91.4 FL (ref 81.4–97.8)
MICROALBUMIN UR-MCNC: <1.2 MG/DL
MICROALBUMIN/CREAT UR: NORMAL MG/G (ref 0–30)
MONOCYTES # BLD AUTO: 0.59 K/UL (ref 0–0.85)
MONOCYTES NFR BLD AUTO: 7.3 % (ref 0–13.4)
NEUTROPHILS # BLD AUTO: 4.82 K/UL (ref 1.82–7.42)
NEUTROPHILS NFR BLD: 59.7 % (ref 44–72)
NRBC # BLD AUTO: 0 K/UL
NRBC BLD-RTO: 0 /100 WBC (ref 0–0.2)
PLATELET # BLD AUTO: 151 K/UL (ref 164–446)
PLATELET # BLD AUTO: 165 K/UL (ref 164–446)
PMV BLD AUTO: 10 FL (ref 9–12.9)
PMV BLD AUTO: 10.4 FL (ref 9–12.9)
POTASSIUM SERPL-SCNC: 4.5 MMOL/L (ref 3.6–5.5)
RBC # BLD AUTO: 5.49 M/UL (ref 4.7–6.1)
RBC # BLD AUTO: 5.49 M/UL (ref 4.7–6.1)
SODIUM SERPL-SCNC: 143 MMOL/L (ref 135–145)
TIBC SERPL-MCNC: 289 UG/DL (ref 250–450)
UIBC SERPL-MCNC: 170 UG/DL (ref 110–370)
WBC # BLD AUTO: 7.7 K/UL (ref 4.8–10.8)
WBC # BLD AUTO: 8.1 K/UL (ref 4.8–10.8)

## 2023-06-05 PROCEDURE — 83540 ASSAY OF IRON: CPT

## 2023-06-05 PROCEDURE — 85025 COMPLETE CBC W/AUTO DIFF WBC: CPT

## 2023-06-05 PROCEDURE — 83550 IRON BINDING TEST: CPT

## 2023-06-05 PROCEDURE — 80048 BASIC METABOLIC PNL TOTAL CA: CPT

## 2023-06-05 PROCEDURE — 82570 ASSAY OF URINE CREATININE: CPT

## 2023-06-05 PROCEDURE — 82728 ASSAY OF FERRITIN: CPT

## 2023-06-05 PROCEDURE — 85027 COMPLETE CBC AUTOMATED: CPT

## 2023-06-05 PROCEDURE — 36415 COLL VENOUS BLD VENIPUNCTURE: CPT

## 2023-06-05 PROCEDURE — 82043 UR ALBUMIN QUANTITATIVE: CPT

## 2023-06-26 DIAGNOSIS — M1A.09X1 IDIOPATHIC CHRONIC GOUT OF MULTIPLE SITES WITH TOPHUS: ICD-10-CM

## 2023-06-26 DIAGNOSIS — K21.9 GASTROESOPHAGEAL REFLUX DISEASE: ICD-10-CM

## 2023-06-26 RX ORDER — OMEPRAZOLE 20 MG/1
20 CAPSULE, DELAYED RELEASE ORAL
Qty: 90 CAPSULE | Refills: 3 | Status: SHIPPED | OUTPATIENT
Start: 2023-06-26 | End: 2023-12-22 | Stop reason: SDUPTHER

## 2023-06-26 RX ORDER — ALLOPURINOL 300 MG/1
300 TABLET ORAL
Qty: 90 TABLET | Refills: 3 | Status: SHIPPED | OUTPATIENT
Start: 2023-06-26 | End: 2023-12-22 | Stop reason: SDUPTHER

## 2023-08-13 SDOH — HEALTH STABILITY: PHYSICAL HEALTH: ON AVERAGE, HOW MANY MINUTES DO YOU ENGAGE IN EXERCISE AT THIS LEVEL?: 0 MIN

## 2023-08-13 SDOH — ECONOMIC STABILITY: HOUSING INSECURITY: IN THE LAST 12 MONTHS, HOW MANY PLACES HAVE YOU LIVED?: 1

## 2023-08-13 SDOH — ECONOMIC STABILITY: FOOD INSECURITY: WITHIN THE PAST 12 MONTHS, YOU WORRIED THAT YOUR FOOD WOULD RUN OUT BEFORE YOU GOT MONEY TO BUY MORE.: NEVER TRUE

## 2023-08-13 SDOH — HEALTH STABILITY: PHYSICAL HEALTH: ON AVERAGE, HOW MANY DAYS PER WEEK DO YOU ENGAGE IN MODERATE TO STRENUOUS EXERCISE (LIKE A BRISK WALK)?: 0 DAYS

## 2023-08-13 SDOH — ECONOMIC STABILITY: FOOD INSECURITY: WITHIN THE PAST 12 MONTHS, THE FOOD YOU BOUGHT JUST DIDN'T LAST AND YOU DIDN'T HAVE MONEY TO GET MORE.: NEVER TRUE

## 2023-08-13 SDOH — ECONOMIC STABILITY: INCOME INSECURITY: IN THE LAST 12 MONTHS, WAS THERE A TIME WHEN YOU WERE NOT ABLE TO PAY THE MORTGAGE OR RENT ON TIME?: NO

## 2023-08-13 SDOH — ECONOMIC STABILITY: INCOME INSECURITY: HOW HARD IS IT FOR YOU TO PAY FOR THE VERY BASICS LIKE FOOD, HOUSING, MEDICAL CARE, AND HEATING?: NOT HARD AT ALL

## 2023-08-13 ASSESSMENT — SOCIAL DETERMINANTS OF HEALTH (SDOH)
WITHIN THE PAST 12 MONTHS, YOU WORRIED THAT YOUR FOOD WOULD RUN OUT BEFORE YOU GOT THE MONEY TO BUY MORE: NEVER TRUE
HOW OFTEN DO YOU ATTENT MEETINGS OF THE CLUB OR ORGANIZATION YOU BELONG TO?: NEVER
HOW OFTEN DO YOU GET TOGETHER WITH FRIENDS OR RELATIVES?: TWICE A WEEK
HOW OFTEN DO YOU GET TOGETHER WITH FRIENDS OR RELATIVES?: TWICE A WEEK
HOW OFTEN DO YOU ATTENT MEETINGS OF THE CLUB OR ORGANIZATION YOU BELONG TO?: NEVER
HOW OFTEN DO YOU HAVE A DRINK CONTAINING ALCOHOL: NEVER
DO YOU BELONG TO ANY CLUBS OR ORGANIZATIONS SUCH AS CHURCH GROUPS UNIONS, FRATERNAL OR ATHLETIC GROUPS, OR SCHOOL GROUPS?: YES
DO YOU BELONG TO ANY CLUBS OR ORGANIZATIONS SUCH AS CHURCH GROUPS UNIONS, FRATERNAL OR ATHLETIC GROUPS, OR SCHOOL GROUPS?: YES
HOW MANY DRINKS CONTAINING ALCOHOL DO YOU HAVE ON A TYPICAL DAY WHEN YOU ARE DRINKING: PATIENT DOES NOT DRINK
HOW OFTEN DO YOU HAVE SIX OR MORE DRINKS ON ONE OCCASION: NEVER
IN A TYPICAL WEEK, HOW MANY TIMES DO YOU TALK ON THE PHONE WITH FAMILY, FRIENDS, OR NEIGHBORS?: MORE THAN THREE TIMES A WEEK
HOW HARD IS IT FOR YOU TO PAY FOR THE VERY BASICS LIKE FOOD, HOUSING, MEDICAL CARE, AND HEATING?: NOT HARD AT ALL
IN A TYPICAL WEEK, HOW MANY TIMES DO YOU TALK ON THE PHONE WITH FAMILY, FRIENDS, OR NEIGHBORS?: MORE THAN THREE TIMES A WEEK
HOW OFTEN DO YOU ATTEND CHURCH OR RELIGIOUS SERVICES?: MORE THAN 4 TIMES PER YEAR
HOW OFTEN DO YOU ATTEND CHURCH OR RELIGIOUS SERVICES?: MORE THAN 4 TIMES PER YEAR

## 2023-08-13 ASSESSMENT — LIFESTYLE VARIABLES
HOW OFTEN DO YOU HAVE A DRINK CONTAINING ALCOHOL: NEVER
SKIP TO QUESTIONS 9-10: 1
HOW MANY STANDARD DRINKS CONTAINING ALCOHOL DO YOU HAVE ON A TYPICAL DAY: PATIENT DOES NOT DRINK
HOW OFTEN DO YOU HAVE SIX OR MORE DRINKS ON ONE OCCASION: NEVER
AUDIT-C TOTAL SCORE: 0

## 2023-08-16 ENCOUNTER — OFFICE VISIT (OUTPATIENT)
Dept: MEDICAL GROUP | Facility: MEDICAL CENTER | Age: 68
End: 2023-08-16
Payer: MEDICARE

## 2023-08-16 VITALS
TEMPERATURE: 98.4 F | HEART RATE: 66 BPM | OXYGEN SATURATION: 95 % | HEIGHT: 73 IN | BODY MASS INDEX: 40.82 KG/M2 | SYSTOLIC BLOOD PRESSURE: 110 MMHG | WEIGHT: 308 LBS | RESPIRATION RATE: 17 BRPM | DIASTOLIC BLOOD PRESSURE: 70 MMHG

## 2023-08-16 DIAGNOSIS — I10 PRIMARY HYPERTENSION: ICD-10-CM

## 2023-08-16 DIAGNOSIS — R73.01 IFG (IMPAIRED FASTING GLUCOSE): ICD-10-CM

## 2023-08-16 DIAGNOSIS — K21.9 GASTROESOPHAGEAL REFLUX DISEASE, UNSPECIFIED WHETHER ESOPHAGITIS PRESENT: ICD-10-CM

## 2023-08-16 DIAGNOSIS — M1A.09X1 IDIOPATHIC CHRONIC GOUT OF MULTIPLE SITES WITH TOPHUS: ICD-10-CM

## 2023-08-16 DIAGNOSIS — D17.71 ANGIOMYOLIPOMA OF RIGHT KIDNEY: ICD-10-CM

## 2023-08-16 DIAGNOSIS — G47.33 OBSTRUCTIVE SLEEP APNEA: Chronic | ICD-10-CM

## 2023-08-16 DIAGNOSIS — I87.2 CHRONIC VENOUS INSUFFICIENCY: ICD-10-CM

## 2023-08-16 DIAGNOSIS — N18.31 STAGE 3A CHRONIC KIDNEY DISEASE: ICD-10-CM

## 2023-08-16 DIAGNOSIS — Z23 NEED FOR PNEUMOCOCCAL VACCINE: ICD-10-CM

## 2023-08-16 DIAGNOSIS — E27.8 ADRENAL NODULE (HCC): ICD-10-CM

## 2023-08-16 DIAGNOSIS — D50.9 IRON DEFICIENCY ANEMIA, UNSPECIFIED IRON DEFICIENCY ANEMIA TYPE: ICD-10-CM

## 2023-08-16 PROCEDURE — 90677 PCV20 VACCINE IM: CPT | Performed by: NURSE PRACTITIONER

## 2023-08-16 PROCEDURE — 3074F SYST BP LT 130 MM HG: CPT | Performed by: NURSE PRACTITIONER

## 2023-08-16 PROCEDURE — G0439 PPPS, SUBSEQ VISIT: HCPCS | Mod: 25 | Performed by: NURSE PRACTITIONER

## 2023-08-16 PROCEDURE — G0009 ADMIN PNEUMOCOCCAL VACCINE: HCPCS | Performed by: NURSE PRACTITIONER

## 2023-08-16 PROCEDURE — 3078F DIAST BP <80 MM HG: CPT | Performed by: NURSE PRACTITIONER

## 2023-08-16 ASSESSMENT — PATIENT HEALTH QUESTIONNAIRE - PHQ9
SUM OF ALL RESPONSES TO PHQ QUESTIONS 1-9: 0
CLINICAL INTERPRETATION OF PHQ2 SCORE: 0

## 2023-08-16 ASSESSMENT — ACTIVITIES OF DAILY LIVING (ADL): BATHING_REQUIRES_ASSISTANCE: 0

## 2023-08-16 ASSESSMENT — ENCOUNTER SYMPTOMS: GENERAL WELL-BEING: GOOD

## 2023-08-16 ASSESSMENT — FIBROSIS 4 INDEX: FIB4 SCORE: 1.87

## 2023-08-16 NOTE — ASSESSMENT & PLAN NOTE
His last lab in 2/23 was mildly elevated at 5.8.  He is not on med for DM.  Not always on healthy diet or regular exercise.

## 2023-08-16 NOTE — PROGRESS NOTES
Chief Complaint   Patient presents with    Annual Exam       HPI:  Max Alberto is a 68 y.o. here for Medicare Annual Wellness Visit.  He is feeling well.  Reviewed lab wiht pt.  GFR is in 60's.  Tries to drink enough water.  Alb/cr ratio, BMP is wnl  CBC, ferritin, FE/TIBC is wnl but he is taking fe daily otc.  He has seen GI.  EGD and colonoscopy were wnl.  They now want to do a capsule endoscopy for further evaluation.  He isn/t sure he wants to do one since his lab is wnl.      Stage 3a chronic kidney disease (HCC)  His GFR is low normal.  Cr on cmp is wnl. Alb/cr ratio is wnl.  No current concerns    Obstructive sleep apnea  He needs o2 2 l/min but only when he uses bipap.  He will f/u with his sleep pulm yearly for titration    Angiomyolipoma of right kidney  Seen and stable on CT 2023.  Will need updated MRI abd 2024    Adrenal nodule (HCC)  Stable on multiple CT scan and MRI abd.    Chronic venous insufficiency  Stable on opal compression hose    GERD (gastroesophageal reflux disease)  Stable and controlled on omeprazole    HTN (hypertension)  Stable and well controlled.  Taking med approp    Idiopathic chronic gout of multiple sites with tophus  Stable and well controlled.  No recent gout flares.  Taking allopruinol approp.    IFG (impaired fasting glucose)  His last lab in 2/23 was mildly elevated at 5.8.  He is not on med for DM.  Not always on healthy diet or regular exercise.         Patient Active Problem List    Diagnosis Date Noted    Angiomyolipoma of right kidney 08/16/2023    IFG (impaired fasting glucose) 08/16/2023    Chickenpox 08/15/2022    Liver hemangioma 01/31/2022    Adrenal nodule (HCC) 01/31/2022    Arthritis     Decreased hearing of right ear 10/01/2020    Morbid obesity with BMI of 40.0-44.9, adult (HCC) 09/04/2020    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/2018    Incisional hernia, without obstruction or gangrene 07/18/2017    Stage 3a chronic kidney  disease (HCC) 01/12/2017    Chronic lower back pain 06/23/2015    Dermatitis, seborrheic 06/23/2015    Dental disorder 12/17/2014    Tinnitus of both ears 10/13/2014    Chronic venous insufficiency 09/04/2014    HTN (hypertension) 09/04/2014    Migraines 09/04/2014    GERD (gastroesophageal reflux disease) 09/04/2014    Seasonal allergies 09/04/2014    Obstructive sleep apnea 09/04/2014       Current Outpatient Medications   Medication Sig Dispense Refill    allopurinol (ZYLOPRIM) 300 MG Tab Take 1 Tablet by mouth every day. 90 Tablet 3    omeprazole (PRILOSEC) 20 MG delayed-release capsule Take 1 Capsule by mouth every day. 90 Capsule 3    triamterene-hctz (MAXZIDE-25/DYAZIDE) 37.5-25 MG Tab Take 1 Tablet by mouth every day. 90 Tablet 3    amoxicillin (AMOXIL) 500 MG Cap TAKE 4 CAPS BY MOUTH before dentist appointment 30 capsule 0    fluticasone (FLONASE) 50 MCG/ACT nasal spray Administer 1 Spray into affected nostril(S) 2 times a day. 16 mL 0    multivitamin (THERAGRAN) Tab Take 1 Tab by mouth every day.      acetaminophen (TYLENOL) 500 MG Tab Take 1,000 mg by mouth every 6 hours as needed (MIGRAINE).      vitamin D (CHOLECALCIFEROL) 1000 UNIT Tab Take 2,000 Units by mouth every day.       No current facility-administered medications for this visit.          Current supplements as per medication list.     Allergies: Patient has no known allergies.    Current social contact/activities:    Spend time with grandchildren     He  reports that he quit smoking about 29 years ago. His smoking use included cigarettes. He has a 25.00 pack-year smoking history. He has never used smokeless tobacco. He reports that he does not currently use alcohol. He reports that he does not use drugs.  Counseling given: Not Answered      ROS:    Gait: Uses no assistive device  Ostomy: No  Other tubes: No  Amputations: No  Chronic oxygen use: No  Last eye exam: 7/2023  Wears hearing aids: No   : Denies any urinary leakage during the last 6  months  Review of Systems   Constitutional: Negative.  Negative for fever, chills, weight loss, malaise/fatigue and diaphoresis.   HENT: Negative.  Negative for hearing loss, ear pain, nosebleeds, congestion, sore throat, neck pain, tinnitus and ear discharge.    Eyes: Negative.  Negative for blurred vision, double vision, photophobia, pain, discharge and redness.   Respiratory: Negative.  Negative for cough, hemoptysis, sputum production, shortness of breath, wheezing and stridor.    Cardiovascular: Negative.  Negative for chest pain, palpitations, orthopnea, claudication, leg swelling and PND.   Gastrointestinal: Negative.  Negative for heartburn, nausea, vomiting, abdominal pain, diarrhea, constipation, blood in stool and melena.   Genitourinary: Negative.  Negative for dysuria, urgency, frequency, incontinence, hematuria and flank pain.   Musculoskeletal: Negative.  Negative for myalgias, back pain, falls.   Skin: Negative.  Negative for itching and rash.   Neurological: Negative.  Negative for dizziness, tingling, tremors, sensory change, speech change, focal weakness, seizures, loss of consciousness, weakness and headaches.   Endo/Heme/Allergies: Negative.  Negative for environmental allergies and polydipsia. Does not bruise/bleed easily.   Psychiatric/Behavioral: Negative.  Negative for depression, suicidal ideas, hallucinations, memory loss and substance abuse. The patient is not nervous/anxious and does not have insomnia.    All other systems reviewed and are negative.        Screening:  Prevnar 20  Depression Screening  Little interest or pleasure in doing things?     Feeling down, depressed , or hopeless? 0 - not at all  Patient Health Questionnaire Score: 0     If depressive symptoms identified deferred to follow up visit unless specifically addressed in assessment and plan.    Interpretation of PHQ-9 Total Score   Score Severity   1-4 No Depression   5-9 Mild Depression   10-14 Moderate Depression    15-19 Moderately Severe Depression   20-27 Severe Depression    Screening for Cognitive Impairment  Three Minute Recall (daughter, heaven, mountain) 3/3    Vick clock face with all 12 numbers and set the hands to show 10 past 11.  Yes    Cognitive concerns identified deferred for follow up unless specifically addressed in assessment and plan.    Fall Risk Assessment  Has the patient had two or more falls in the last year or any fall with injury in the last year?  No    Safety Assessment  Throw rugs on floor.  Yes  Handrails on all stairs.  Yes  Good lighting in all hallways.  Yes  Difficulty hearing.  No  Patient counseled about all safety risks that were identified.    Functional Assessment ADLs  Are there any barriers preventing you from cooking for yourself or meeting nutritional needs?  No.    Are there any barriers preventing you from driving safely or obtaining transportation?  No.    Are there any barriers preventing you from using a telephone or calling for help?  No.    Are there any barriers preventing you from shopping?  No.    Are there any barriers preventing you from taking care of your own finances?  No.    Are there any barriers preventing you from managing your medications?  No.    Are there any barriers preventing you from showering, bathing or dressing yourself?  No.    Are you currently engaging in any exercise or physical activity?  No.     What is your perception of your health?  Good    Advance Care Planning  Do you have an Advance Directive, Living Will, Durable Power of , or POLST? No                 Health Maintenance Summary            Influenza Vaccine (1) Next due on 9/1/2023 11/20/2022  Imm Admin: Influenza Vaccine Adult     11/26/2021  Imm Admin: Influenza, Unspecified - HISTORICAL DATA    11/26/2021  Imm Admin: Influenza Vaccine-Flucelvax - HISTORICAL DATA    10/01/2020  Imm Admin: Influenza Vaccine Adult     12/12/2019  Imm Admin: Influenza, Unspecified -  HISTORICAL DATA    Only the first 5 history entries have been loaded, but more history exists.              Annual Wellness Visit (Every 366 Days) Next due on 2023  Done    08/15/2022  Done    2021  Level of Service: [place] ANNUAL WELLNESS VISIT-INCLUDES PPPS SUBSEQUE*    2020  Subsequent Annual Wellness Visit - Includes PPPS ()    2020  Visit Dx: Medicare annual wellness visit, subsequent    Only the first 5 history entries have been loaded, but more history exists.              IMM DTaP/Tdap/Td Vaccine (2 - Td or Tdap) Next due on 2015  Imm Admin: Tdap Vaccine              Colorectal Cancer Screening (Colonoscopy - Preferred) Next due on 3/19/2033      2023  AMB EXTERNAL COLONOSCOPY RESULTS    2023  AMB EXTERNAL COLONOSCOPY RESULTS    2018  REFERRAL TO GI FOR COLONOSCOPY              Zoster (Shingles) Vaccines (Series Information) Completed      2019  Imm Admin: Zoster Vaccine Recombinant (RZV) (SHINGRIX)    2018  Imm Admin: Zoster Vaccine Recombinant (RZV) (SHINGRIX)    2016  Imm Admin: Zoster Vaccine Live (ZVL) (Zostavax) - HISTORICAL DATA              Hepatitis C Screening  Tentatively Complete      2020  Hepatitis C Antibody component of HCV Scrn ( 7127-2604 1xLife)              COVID-19 Vaccine (Series Information) Completed      10/03/2022  Imm Admin: PFIZER BIVALENT SARS-COV-2 VACCINE (12+)    2022  Imm Admin: COVID-19 Vaccine, unspecified - HISTORICAL DATA    2022  Imm Admin: COVID-19 Vaccine, unspecified - HISTORICAL DATA    2022  Imm Admin: PFIZER BIVALENT SARS-COV-2 VACCINE (12+)    2021  Imm Admin: PFIZER PURPLE CAP SARS-COV-2 VACCINATION (12+)    Only the first 5 history entries have been loaded, but more history exists.              Abdominal Aortic Aneurysm (AAA) Screening  Tentatively Complete      2023  CT-ABDOMEN-PELVIS WITH    2019  CT-ABDOMEN-PELVIS WITH               Pneumococcal Vaccine: 65+ Years (Series Information) Completed      2023  Imm Admin: Pneumococcal Conjugate Vaccine (PCV20)    10/08/2020  Imm Admin: Pneumococcal polysaccharide vaccine (PPSV-23)    10/08/2020  Imm Admin: Pneumococcal Vaccine (UF) - HISTORICAL DATA    2019  Imm Admin: Pneumococcal Conjugate Vaccine (Prevnar/PCV-13)              Hepatitis A Vaccine (Hep A) (Series Information) Aged Out      No completion history exists for this topic.              Hepatitis B Vaccine (Hep B) (Series Information) Aged Out      No completion history exists for this topic.              HPV Vaccines (Series Information) Aged Out      No completion history exists for this topic.              Polio Vaccine (Inactivated Polio) (Series Information) Aged Out      No completion history exists for this topic.              IMM MENINGOCOCCAL ACWY VACCINE (Series Information) Aged Out      No completion history exists for this topic.                    Patient Care Team:  RAFAEL Arana as PCP - General (Family Medicine)  Hyacinth Timmons M.D. as PCP - [facility] Paneled  Julian Carmen M.D. as Consulting Physician (Surgery)  Oscar Avila M.D. as Consulting Physician (Orthopedic Surgery)  Sulaiman Hook D.D.S. as Consulting Physician (Dentistry)  Bhavik Palumbo M.D. as Consulting Physician (Gastroenterology)  Man Gallo as [facility]   [employer] (DME Supplier)  [employer] as Respiratory Therapist (DME Services)        Social History     Tobacco Use    Smoking status: Former     Packs/day: 1.00     Years: 25.00     Additional pack years: 0.00     Total pack years: 25.00     Types: Cigarettes     Quit date: 1994     Years since quittin.6    Smokeless tobacco: Never   Vaping Use    Vaping Use: Never used   Substance Use Topics    Alcohol use: Not Currently     Alcohol/week: 0.0 oz     Comment: one a month    Drug use: No     Family History   Problem Relation Age of  Onset    Diabetes Mother     Arthritis Mother     Lung Disease Father     Arthritis Father     Heart Disease Father     Prostate cancer Father     Hypertension Father     Alcohol/Drug Brother      He  has a past medical history of Adrenal nodule (HCC) (01/31/2022), Angiomyolipoma of right kidney (8/16/2023), Arthritis, Chickenpox, Chronic kidney disease (CKD), stage III (moderate) (HCC) (01/12/2017), Chronic lower back pain (06/23/2015), Chronic venous insufficiency (09/04/2014), Decreased hearing of right ear (10/01/2020), Dental disorder (12/17/2014), Dermatitis, seborrheic (06/23/2015), GERD (gastroesophageal reflux disease), Headache, classical migraine, HTN (hypertension) (09/04/2014), Idiopathic chronic gout of multiple sites with tophus (07/17/2018), IFG (impaired fasting glucose) (8/16/2023), Incisional hernia, without obstruction or gangrene (07/18/2017), Liver hemangioma (01/31/2022), Morbid obesity with BMI of 40.0-44.9, adult (HCC) (09/04/2020), Seasonal allergies (09/04/2014), Sleep apnea, and Tinnitus of both ears (10/13/2014).    He has no past medical history of Apnea, sleep, Daytime sleepiness, Gasping for breath, Insomnia, Morning headache, or Snoring.   Past Surgical History:   Procedure Laterality Date    OH LAP,DIAGNOSTIC ABDOMEN  8/7/2019    Procedure: LAPAROSCOPY-DIAGNOSTIC;  Surgeon: Julian Carmen M.D.;  Location: [facility];  Service: General    LAPAROSCOPIC LYSIS OF ADHESIONS  8/7/2019    Procedure: LYSIS, ADHESIONS, LAPAROSCOPIC;  Surgeon: Julian Carmen M.D.;  Location: [facility];  Service: General    INCISION HERNIA REPAIR  8/7/2019    Procedure: REPAIR, HERNIA, INCISIONAL- POSSIBLE;  Surgeon: Julian Carmen M.D.;  Location: [facility];  Service: General    VENTRAL HERNIA REPAIR LAPAROSCOPIC  10/1/2018    Procedure: VENTRAL HERNIA REPAIR LAPAROSCOPIC- FOR INCISIONAL HERNIA WITH MESH;  Surgeon: Julian Carmen M.D.;  Location: [facility];   "Service: General    GASTRIC SLEEVE LAPAROSCOPY  12/29/2014    Performed by Julian Carmen M.D. at SURGERY [facility]    KNEE REPLACEMENT, TOTAL  2012    left    OTHER ORTHOPEDIC SURGERY  1998    bicep tendon repair    APPENDECTOMY      ARTHROSCOPY, KNEE      CHOLECYSTECTOMY      SLEEVE,ANA VASO THIGH      TONSILLECTOMY         Exam:   /70 (BP Location: Right arm, Patient Position: Sitting, BP Cuff Size: Adult)   Pulse 66   Temp 36.9 °C (98.4 °F) (Temporal)   Resp 17   Ht 1.854 m (6' 1\")   Wt (!) 140 kg (308 lb)   SpO2 95%  Body mass index is 40.64 kg/m².  Hearing good.    Dentition good  Alert, oriented in no acute distress.  Eye contact is good, speech goal directed, affect calm  Review of Systems   Constitutional: Negative.  Negative for fever, chills, weight loss, malaise/fatigue and diaphoresis.   HENT: Negative.  Negative for hearing loss, ear pain, nosebleeds, congestion, sore throat, neck pain, tinnitus and ear discharge.    Eyes: Negative.  Negative for blurred vision, double vision, photophobia, pain, discharge and redness.   Respiratory: Negative.  Negative for cough, hemoptysis, sputum production, shortness of breath, wheezing and stridor.    Cardiovascular: Negative.  Negative for chest pain, palpitations, orthopnea, claudication, leg swelling and PND.   Gastrointestinal: Negative.  Negative for heartburn, nausea, vomiting, abdominal pain, diarrhea, constipation, blood in stool and melena.   Genitourinary: Negative.  Negative for dysuria, urgency, frequency, incontinence, hematuria and flank pain.   Musculoskeletal: Negative.  Negative for myalgias, back pain, joint pain and falls.   Skin: Negative.  Negative for itching and rash.   Neurological: Negative.  Negative for dizziness, tingling, tremors, sensory change, speech change, focal weakness, seizures, loss of consciousness, weakness and headaches.   Endo/Heme/Allergies: Negative.  Negative for environmental allergies and polydipsia. " Does not bruise/bleed easily.   Psychiatric/Behavioral: Negative.  Negative for depression, suicidal ideas, hallucinations, memory loss and substance abuse. The patient is not nervous/anxious and does not have insomnia.    All other systems reviewed and are negative.      Assessment and Plan. The following treatment and monitoring plan is recommended:    1. Iron deficiency anemia, unspecified iron deficiency anemia type  CBC WITH DIFFERENTIAL    cbc & fe studies wnl but on otc fe. stop FE. frank cbc 3 mo. f/u for review. will need further GI eval if fe def reoccurs      2. Primary hypertension      stable and controlled on meds      3. IFG (impaired fasting glucose)  Comp Metabolic Panel    HEMOGLOBIN A1C    Lipid Profile    enc pt to improve healthy deit and do regular exercise. do updated lab 3 mo. f/u for review.       4. Idiopathic chronic gout of multiple sites with tophus  URIC ACID    stable and well controlled on allopurinol.  do uric acid updated lab. f/u 2-3 mo for lab review      5. Stage 3a chronic kidney disease (HCC)      GFR low normal. enc pt to increase h20 intake. he is trying      6. Obstructive sleep apnea      stable on Bipap. followed by sleep pulm      7. Angiomyolipoma of right kidney      stable at this time. will need repeat MRI abd in 2/24.        8. Adrenal nodule (HCC)      stable per multiple ct scans      9. Chronic venous insufficiency      followed by cardiac.  wears compression hose      10. Gastroesophageal reflux disease, unspecified whether esophagitis present      well controlled on omeprazole      11. Need for pneumococcal vaccine  Pneumococcal Conjugate Vaccine 20-Valent (19 yrs+)    prevnar 20 given today            Services suggested: No services needed at this time  Health Care Screening: Age-appropriate preventive services recommended by USPTF and ACIP covered by Medicare were discussed today. Services ordered if indicated and agreed upon by the patient.  Referrals offered:  Community-based lifestyle interventions to reduce health risks and promote self-management and wellness, fall prevention, nutrition, physical activity, tobacco-use cessation, weight loss, and mental health services as per orders if indicated.    Discussion today about general wellness and lifestyle habits:    Prevent falls and reduce trip hazards; Cautioned about securing or removing rugs.  Have a working fire alarm and carbon monoxide detector;   Engage in regular physical activity and social activities     Follow-up: Return in about 3 months (around 11/16/2023), or for lab review.

## 2023-09-11 ENCOUNTER — HOSPITAL ENCOUNTER (OUTPATIENT)
Dept: LAB | Facility: MEDICAL CENTER | Age: 68
End: 2023-09-11
Attending: UROLOGY
Payer: MEDICARE

## 2023-09-11 PROCEDURE — 84154 ASSAY OF PSA FREE: CPT

## 2023-09-11 PROCEDURE — 84153 ASSAY OF PSA TOTAL: CPT

## 2023-09-11 PROCEDURE — 36415 COLL VENOUS BLD VENIPUNCTURE: CPT

## 2023-09-12 LAB
PSA FREE MFR SERPL: 27 %
PSA FREE SERPL-MCNC: 0.9 NG/ML
PSA SERPL-MCNC: 3.3 NG/ML (ref 0–4)

## 2023-09-13 DIAGNOSIS — D50.9 IRON DEFICIENCY ANEMIA, UNSPECIFIED IRON DEFICIENCY ANEMIA TYPE: ICD-10-CM

## 2023-09-20 ENCOUNTER — PATIENT MESSAGE (OUTPATIENT)
Dept: SLEEP MEDICINE | Facility: MEDICAL CENTER | Age: 68
End: 2023-09-20
Payer: MEDICARE

## 2023-09-20 DIAGNOSIS — G47.33 OBSTRUCTIVE SLEEP APNEA: ICD-10-CM

## 2023-12-19 ENCOUNTER — PATIENT MESSAGE (OUTPATIENT)
Dept: MEDICAL GROUP | Facility: MEDICAL CENTER | Age: 68
End: 2023-12-19
Payer: MEDICARE

## 2023-12-19 DIAGNOSIS — I10 ESSENTIAL HYPERTENSION: ICD-10-CM

## 2023-12-19 NOTE — PATIENT COMMUNICATION
Requested Prescriptions     Pending Prescriptions Disp Refills    triamterene-hctz (MAXZIDE-25/DYAZIDE) 37.5-25 MG Tab 90 Tablet 3     Sig: Take 1 Tablet by mouth every day.

## 2023-12-22 DIAGNOSIS — I10 ESSENTIAL HYPERTENSION: ICD-10-CM

## 2023-12-22 DIAGNOSIS — M1A.09X1 IDIOPATHIC CHRONIC GOUT OF MULTIPLE SITES WITH TOPHUS: ICD-10-CM

## 2023-12-22 DIAGNOSIS — K21.9 GASTROESOPHAGEAL REFLUX DISEASE: ICD-10-CM

## 2023-12-23 RX ORDER — OMEPRAZOLE 20 MG/1
20 CAPSULE, DELAYED RELEASE ORAL
Qty: 100 CAPSULE | Refills: 3 | Status: SHIPPED | OUTPATIENT
Start: 2023-12-23

## 2023-12-23 RX ORDER — ALLOPURINOL 300 MG/1
300 TABLET ORAL
Qty: 100 TABLET | Refills: 3 | Status: SHIPPED | OUTPATIENT
Start: 2023-12-23

## 2023-12-23 RX ORDER — TRIAMTERENE AND HYDROCHLOROTHIAZIDE 37.5; 25 MG/1; MG/1
1 TABLET ORAL
Qty: 100 TABLET | Refills: 3 | Status: SHIPPED | OUTPATIENT
Start: 2023-12-23 | End: 2024-01-11

## 2023-12-23 RX ORDER — TRIAMTERENE AND HYDROCHLOROTHIAZIDE 37.5; 25 MG/1; MG/1
1 TABLET ORAL
Qty: 100 TABLET | Refills: 3 | Status: SHIPPED | OUTPATIENT
Start: 2023-12-23

## 2023-12-29 ENCOUNTER — TELEPHONE (OUTPATIENT)
Dept: HEALTH INFORMATION MANAGEMENT | Facility: OTHER | Age: 68
End: 2023-12-29
Payer: MEDICARE

## 2024-01-02 ENCOUNTER — HOSPITAL ENCOUNTER (OUTPATIENT)
Dept: LAB | Facility: MEDICAL CENTER | Age: 69
End: 2024-01-02
Attending: NURSE PRACTITIONER
Payer: MEDICARE

## 2024-01-02 ENCOUNTER — HOSPITAL ENCOUNTER (OUTPATIENT)
Dept: LAB | Facility: MEDICAL CENTER | Age: 69
End: 2024-01-02
Attending: STUDENT IN AN ORGANIZED HEALTH CARE EDUCATION/TRAINING PROGRAM
Payer: MEDICARE

## 2024-01-02 DIAGNOSIS — D50.9 IRON DEFICIENCY ANEMIA, UNSPECIFIED IRON DEFICIENCY ANEMIA TYPE: ICD-10-CM

## 2024-01-02 DIAGNOSIS — M1A.09X1 IDIOPATHIC CHRONIC GOUT OF MULTIPLE SITES WITH TOPHUS: ICD-10-CM

## 2024-01-02 DIAGNOSIS — R73.01 IFG (IMPAIRED FASTING GLUCOSE): ICD-10-CM

## 2024-01-02 LAB
ALBUMIN SERPL BCP-MCNC: 4.1 G/DL (ref 3.2–4.9)
ALBUMIN/GLOB SERPL: 1.3 G/DL
ALP SERPL-CCNC: 53 U/L (ref 30–99)
ALT SERPL-CCNC: 22 U/L (ref 2–50)
ANION GAP SERPL CALC-SCNC: 13 MMOL/L (ref 7–16)
AST SERPL-CCNC: 15 U/L (ref 12–45)
BASOPHILS # BLD AUTO: 0.4 % (ref 0–1.8)
BASOPHILS # BLD: 0.03 K/UL (ref 0–0.12)
BILIRUB SERPL-MCNC: 1.5 MG/DL (ref 0.1–1.5)
BUN SERPL-MCNC: 23 MG/DL (ref 8–22)
CALCIUM ALBUM COR SERPL-MCNC: 9.3 MG/DL (ref 8.5–10.5)
CALCIUM SERPL-MCNC: 9.4 MG/DL (ref 8.5–10.5)
CHLORIDE SERPL-SCNC: 103 MMOL/L (ref 96–112)
CHOLEST SERPL-MCNC: 178 MG/DL (ref 100–199)
CO2 SERPL-SCNC: 24 MMOL/L (ref 20–33)
CREAT SERPL-MCNC: 1.12 MG/DL (ref 0.5–1.4)
EOSINOPHIL # BLD AUTO: 0.3 K/UL (ref 0–0.51)
EOSINOPHIL NFR BLD: 4.3 % (ref 0–6.9)
ERYTHROCYTE [DISTWIDTH] IN BLOOD BY AUTOMATED COUNT: 47.3 FL (ref 35.9–50)
EST. AVERAGE GLUCOSE BLD GHB EST-MCNC: 108 MG/DL
FERRITIN SERPL-MCNC: 67 NG/ML (ref 22–322)
GFR SERPLBLD CREATININE-BSD FMLA CKD-EPI: 71 ML/MIN/1.73 M 2
GLOBULIN SER CALC-MCNC: 3.2 G/DL (ref 1.9–3.5)
GLUCOSE SERPL-MCNC: 99 MG/DL (ref 65–99)
HBA1C MFR BLD: 5.4 % (ref 4–5.6)
HCT VFR BLD AUTO: 48.2 % (ref 42–52)
HDLC SERPL-MCNC: 42 MG/DL
HGB BLD-MCNC: 16.6 G/DL (ref 14–18)
IMM GRANULOCYTES # BLD AUTO: 0.02 K/UL (ref 0–0.11)
IMM GRANULOCYTES NFR BLD AUTO: 0.3 % (ref 0–0.9)
IRON SATN MFR SERPL: 46 % (ref 15–55)
IRON SERPL-MCNC: 120 UG/DL (ref 50–180)
LDLC SERPL CALC-MCNC: 93 MG/DL
LYMPHOCYTES # BLD AUTO: 1.89 K/UL (ref 1–4.8)
LYMPHOCYTES NFR BLD: 27 % (ref 22–41)
MCH RBC QN AUTO: 33 PG (ref 27–33)
MCHC RBC AUTO-ENTMCNC: 34.4 G/DL (ref 32.3–36.5)
MCV RBC AUTO: 95.8 FL (ref 81.4–97.8)
MONOCYTES # BLD AUTO: 0.52 K/UL (ref 0–0.85)
MONOCYTES NFR BLD AUTO: 7.4 % (ref 0–13.4)
NEUTROPHILS # BLD AUTO: 4.24 K/UL (ref 1.82–7.42)
NEUTROPHILS NFR BLD: 60.6 % (ref 44–72)
NRBC # BLD AUTO: 0 K/UL
NRBC BLD-RTO: 0 /100 WBC (ref 0–0.2)
PLATELET # BLD AUTO: 148 K/UL (ref 164–446)
PMV BLD AUTO: 10.4 FL (ref 9–12.9)
POTASSIUM SERPL-SCNC: 3.8 MMOL/L (ref 3.6–5.5)
PROT SERPL-MCNC: 7.3 G/DL (ref 6–8.2)
PSA SERPL-MCNC: 3.35 NG/ML (ref 0–4)
RBC # BLD AUTO: 5.03 M/UL (ref 4.7–6.1)
SODIUM SERPL-SCNC: 140 MMOL/L (ref 135–145)
TIBC SERPL-MCNC: 259 UG/DL (ref 250–450)
TRIGL SERPL-MCNC: 213 MG/DL (ref 0–149)
UIBC SERPL-MCNC: 139 UG/DL (ref 110–370)
URATE SERPL-MCNC: 6 MG/DL (ref 2.5–8.3)
WBC # BLD AUTO: 7 K/UL (ref 4.8–10.8)

## 2024-01-02 PROCEDURE — 84550 ASSAY OF BLOOD/URIC ACID: CPT

## 2024-01-02 PROCEDURE — 80053 COMPREHEN METABOLIC PANEL: CPT

## 2024-01-02 PROCEDURE — 36415 COLL VENOUS BLD VENIPUNCTURE: CPT

## 2024-01-02 PROCEDURE — 80061 LIPID PANEL: CPT

## 2024-01-02 PROCEDURE — 83550 IRON BINDING TEST: CPT

## 2024-01-02 PROCEDURE — 82728 ASSAY OF FERRITIN: CPT

## 2024-01-02 PROCEDURE — 84153 ASSAY OF PSA TOTAL: CPT

## 2024-01-02 PROCEDURE — 83540 ASSAY OF IRON: CPT

## 2024-01-02 PROCEDURE — 85025 COMPLETE CBC W/AUTO DIFF WBC: CPT

## 2024-01-02 PROCEDURE — 83036 HEMOGLOBIN GLYCOSYLATED A1C: CPT

## 2024-01-10 PROBLEM — I70.0 ATHEROSCLEROSIS OF AORTA (HCC): Status: ACTIVE | Noted: 2024-01-10

## 2024-01-11 ENCOUNTER — OFFICE VISIT (OUTPATIENT)
Dept: MEDICAL GROUP | Facility: MEDICAL CENTER | Age: 69
End: 2024-01-11
Payer: MEDICARE

## 2024-01-11 VITALS
BODY MASS INDEX: 40.82 KG/M2 | TEMPERATURE: 98.1 F | HEIGHT: 73 IN | SYSTOLIC BLOOD PRESSURE: 120 MMHG | HEART RATE: 107 BPM | OXYGEN SATURATION: 96 % | DIASTOLIC BLOOD PRESSURE: 82 MMHG | WEIGHT: 308 LBS

## 2024-01-11 DIAGNOSIS — L98.9 SKIN LESION OF NECK: ICD-10-CM

## 2024-01-11 DIAGNOSIS — R73.01 IFG (IMPAIRED FASTING GLUCOSE): ICD-10-CM

## 2024-01-11 DIAGNOSIS — D50.9 IRON DEFICIENCY ANEMIA, UNSPECIFIED IRON DEFICIENCY ANEMIA TYPE: ICD-10-CM

## 2024-01-11 DIAGNOSIS — E78.5 HYPERLIPIDEMIA LDL GOAL <100: ICD-10-CM

## 2024-01-11 DIAGNOSIS — M1A.09X1 IDIOPATHIC CHRONIC GOUT OF MULTIPLE SITES WITH TOPHUS: ICD-10-CM

## 2024-01-11 DIAGNOSIS — S16.1XXA STRAIN OF STERNOCLEIDOMASTOID MUSCLE, INITIAL ENCOUNTER: ICD-10-CM

## 2024-01-11 PROCEDURE — 3074F SYST BP LT 130 MM HG: CPT | Performed by: NURSE PRACTITIONER

## 2024-01-11 PROCEDURE — 3079F DIAST BP 80-89 MM HG: CPT | Performed by: NURSE PRACTITIONER

## 2024-01-11 PROCEDURE — 99214 OFFICE O/P EST MOD 30 MIN: CPT | Performed by: NURSE PRACTITIONER

## 2024-01-11 ASSESSMENT — FIBROSIS 4 INDEX: FIB4 SCORE: 1.49

## 2024-01-11 NOTE — PROGRESS NOTES
Subjective:     Max Alberto is a 68 y.o. male who presents with IFG.    HPI:   Seen in for IFG.    He is having left postauricular pain. Wife says he looks at his tablet freq. No arm pain or numbness.  No significant allergy sx.   During holidays he has not been exercising or eating healthy. He is going to improve diet and restart exercise this week.  He has a changing skin tag that is bothering him on left lower neck.  Will f/u for removal.  Reviewed lab with pt.  CBC, FE/TIBC, ferritin Is wnl except plts mildly elevated.  His previous fe anemia is resolved.  He has been off fe since august.    Ferritin was 12.6 one year ago, then 35.3 seven mo ago.  Now up to 67 off fe.  No sx of bleeding. He did not do the capsule endoscopy since lab is normal.  No sx of bleeding.   A1c is down from 5.8 to 5.4. not on med for DM.  CMP, GFR, uric acid is wnl.  LP shows trg up from 115 to 213 d/t recent poor diet. HDL is improving. Up from 39 in 2021 to 42.  Has been as high at 50 in past when he could exercise. LDL is at goal. Goal <100.    HE IS Stable w/o gout flares on allopurinol.      Patient Active Problem List    Diagnosis Date Noted    Atherosclerosis of aorta (HCC) 01/10/2024    Angiomyolipoma of right kidney 08/16/2023    IFG (impaired fasting glucose) 08/16/2023    Chickenpox 08/15/2022    Liver hemangioma 01/31/2022    Adrenal nodule (HCC) 01/31/2022    Arthritis     Decreased hearing of right ear 10/01/2020    Morbid obesity with BMI of 40.0-44.9, adult (HCC) 09/04/2020    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/2018    Incisional hernia, without obstruction or gangrene 07/18/2017    Stage 3a chronic kidney disease (HCC) 01/12/2017    Chronic lower back pain 06/23/2015    Dermatitis, seborrheic 06/23/2015    Dental disorder 12/17/2014    Tinnitus of both ears 10/13/2014    Chronic venous insufficiency 09/04/2014    HTN (hypertension) 09/04/2014    Migraines 09/04/2014    GERD  "(gastroesophageal reflux disease) 09/04/2014    Seasonal allergies 09/04/2014    Obstructive sleep apnea 09/04/2014       Current medicines (including changes today)  Current Outpatient Medications   Medication Sig Dispense Refill    triamterene-hctz (MAXZIDE-25/DYAZIDE) 37.5-25 MG Tab Take 1 Tablet by mouth every day. 100 Tablet 3    allopurinol (ZYLOPRIM) 300 MG Tab Take 1 Tablet by mouth every day. 100 Tablet 3    omeprazole (PRILOSEC) 20 MG delayed-release capsule Take 1 Capsule by mouth every day. 100 Capsule 3    amoxicillin (AMOXIL) 500 MG Cap TAKE 4 CAPS BY MOUTH before dentist appointment 30 capsule 0    multivitamin (THERAGRAN) Tab Take 1 Tab by mouth every day.      acetaminophen (TYLENOL) 500 MG Tab Take 1,000 mg by mouth every 6 hours as needed (MIGRAINE).      vitamin D (CHOLECALCIFEROL) 1000 UNIT Tab Take 2,000 Units by mouth every day.       No current facility-administered medications for this visit.       No Known Allergies    ROS  Constitutional: Negative. Negative for fever, chills, weight loss, malaise/fatigue and diaphoresis.   HENT: Negative. Negative for hearing loss, ear pain, nosebleeds, congestion, sore throat, neck pain, tinnitus and ear discharge.   Respiratory: Negative. Negative for cough, hemoptysis, sputum production, shortness of breath, wheezing and stridor.   Cardiovascular: Negative. Negative for chest pain, palpitations, orthopnea, claudication, leg swelling and PND.   Gastrointestinal: Denies nausea, vomiting, diarrhea, constipation, heartburn, melena or hematochezia.  Genitourinary: Denies dysuria, hematuria, urinary incontinence, frequency or urgency.        Objective:     /82 (BP Location: Right arm, Patient Position: Sitting, BP Cuff Size: Large adult)   Pulse (!) 107   Temp 36.7 °C (98.1 °F) (Temporal)   Ht 1.854 m (6' 1\")   Wt (!) 140 kg (308 lb)   SpO2 96%  Body mass index is 40.64 kg/m².    Physical Exam:  Vitals reviewed.  Constitutional: Oriented to person, " place, and time. appears well-developed and well-nourished. No distress.   Cardiovascular: Normal rate, regular rhythm, normal heart sounds and intact distal pulses. Exam reveals no gallop and no friction rub. No murmur heard. No carotid bruits.   Pulmonary/Chest: Effort normal and breath sounds normal. No stridor. No respiratory distress. no wheezes or rales. exhibits no tenderness.   Musculoskeletal: Normal range of motion. exhibits no edema. opal pedal pulses 2+.  Lymphadenopathy: No cervical or supraclavicular adenopathy.   Neurological: Alert and oriented to person, place, and time. exhibits normal muscle tone.  Skin: Skin is warm and dry. No diaphoresis.  Left lower neck skin tag with darkened end  Psychiatric: Normal mood and affect. Behavior is normal.      Assessment and Plan:     The following treatment plan was discussed:    1. IFG (impaired fasting glucose)      a1c improved from previous. enc pt to improve healthy diet & regular exercise. plan: f/u 8/24 for lab review & HRA      2. Iron deficiency anemia, unspecified iron deficiency anemia type      Resolved, stable off fe. continue to monitor      3. Hyperlipidemia LDL goal <100      LP shows trg high, HDL low but better, LDL at goal. not on statin. enc pt to improve healthy lifestyle. frank 8/24. f/u for review      4. Idiopathic chronic gout of multiple sites with tophus      stable w/o flares on allopurinol      5. Strain of sternocleidomastoid muscle, initial encounter      cautioned pt to raise phone & tablet to eye level. f/u if sx persist.      6. Skin lesion of neck      skin tag that is bothering him. f/u for removal            Followup: Return in about 7 months (around 8/11/2024), or call for lab slip. will do HRA in 8/24.

## 2024-02-03 DIAGNOSIS — E27.8 ADRENAL NODULE (HCC): ICD-10-CM

## 2024-02-03 DIAGNOSIS — D17.71 ANGIOMYOLIPOMA OF RIGHT KIDNEY: ICD-10-CM

## 2024-02-16 ENCOUNTER — PATIENT MESSAGE (OUTPATIENT)
Dept: HEALTH INFORMATION MANAGEMENT | Facility: OTHER | Age: 69
End: 2024-02-16

## 2024-03-11 ENCOUNTER — HOSPITAL ENCOUNTER (OUTPATIENT)
Dept: RADIOLOGY | Facility: MEDICAL CENTER | Age: 69
End: 2024-03-11
Attending: NURSE PRACTITIONER
Payer: MEDICARE

## 2024-03-11 DIAGNOSIS — E27.8 ADRENAL NODULE (HCC): ICD-10-CM

## 2024-03-11 DIAGNOSIS — D17.71 ANGIOMYOLIPOMA OF RIGHT KIDNEY: ICD-10-CM

## 2024-03-11 PROCEDURE — A9579 GAD-BASE MR CONTRAST NOS,1ML: HCPCS | Performed by: NURSE PRACTITIONER

## 2024-03-11 PROCEDURE — 74183 MRI ABD W/O CNTR FLWD CNTR: CPT

## 2024-03-11 PROCEDURE — 700117 HCHG RX CONTRAST REV CODE 255: Performed by: NURSE PRACTITIONER

## 2024-03-11 RX ADMIN — GADOTERIDOL 20 ML: 279.3 INJECTION, SOLUTION INTRAVENOUS at 09:49

## 2024-03-17 DIAGNOSIS — K86.9 LESION OF PANCREAS: ICD-10-CM

## 2024-05-14 ENCOUNTER — OFFICE VISIT (OUTPATIENT)
Dept: SLEEP MEDICINE | Facility: MEDICAL CENTER | Age: 69
End: 2024-05-14
Attending: NURSE PRACTITIONER
Payer: MEDICARE

## 2024-05-14 VITALS
WEIGHT: 315 LBS | RESPIRATION RATE: 16 BRPM | HEIGHT: 73 IN | BODY MASS INDEX: 41.75 KG/M2 | HEART RATE: 82 BPM | OXYGEN SATURATION: 94 % | SYSTOLIC BLOOD PRESSURE: 128 MMHG | DIASTOLIC BLOOD PRESSURE: 84 MMHG

## 2024-05-14 DIAGNOSIS — I10 PRIMARY HYPERTENSION: ICD-10-CM

## 2024-05-14 DIAGNOSIS — E66.01 MORBID OBESITY WITH BMI OF 40.0-44.9, ADULT (HCC): ICD-10-CM

## 2024-05-14 DIAGNOSIS — Z87.891 FORMER SMOKER: ICD-10-CM

## 2024-05-14 DIAGNOSIS — G47.33 OBSTRUCTIVE SLEEP APNEA: Chronic | ICD-10-CM

## 2024-05-14 PROCEDURE — 3079F DIAST BP 80-89 MM HG: CPT | Performed by: NURSE PRACTITIONER

## 2024-05-14 PROCEDURE — 3074F SYST BP LT 130 MM HG: CPT | Performed by: NURSE PRACTITIONER

## 2024-05-14 PROCEDURE — 99213 OFFICE O/P EST LOW 20 MIN: CPT | Performed by: NURSE PRACTITIONER

## 2024-05-14 ASSESSMENT — PATIENT HEALTH QUESTIONNAIRE - PHQ9: CLINICAL INTERPRETATION OF PHQ2 SCORE: 0

## 2024-05-14 ASSESSMENT — FIBROSIS 4 INDEX: FIB4 SCORE: 1.49

## 2024-05-14 NOTE — PROGRESS NOTES
Chief Complaint   Patient presents with    Follow-Up     Apnea   Last seen on 5/2/2023- Kacey GOMEZ   BiPAP 17/12 cm         HPI:  Max Alberto is a 68 y.o. year old male here today for follow-up on DEDRA.  Last OV 5/2/23     Currently using BIPAP @ 15/10cm H20 nightly w/2LPM O2 bleed in; RESMED; device obtained 2022.  He also brought his replacement device from recall that he would like to verify settings are appropriate.   Compliance report 4/14/24 through 5/13/2024 indicates 90% compliance, average nightly 6 hours 13 minutes, no significant mask leak with reduced AHI of 1.0/h.  Reviewed with patient.    He talks mask and pressure well.  Mechanically the machine is fine.  He goes between 9 PM-10 PM with also quickly.  He usually wakes between 3:30 AM and 4:15 AM and sometimes awake at that point or will lay in bed until he gets out of bed.  He denies pain or shortness of breath occasionally have a headache which she feels is due to his pillow causing neck issues.  No regular daytime napping or dozing off.  No similar cardiac respiratory symptoms.    Sleep hx:  Patient has a history of sleep apnea and previously diagnosed in 2002 in [place].  He was initially started on CPAP in the early 2000's and switched to BiPAP in 2014.  He also underwent gastric sleeve in 2013 and lost 60 pounds.   Updated home sleep study 5/11/2022 noted severe sleep apnea with GIOVANI 68.0/h and O2 heidi of 58%.  Patient saturations were less than 90% for 82% of evaluation time.  The majority of events are obstructive apneas.  He underwent titration study 6/26/2022 noting best response to BiPAP 17/12 cm with reduced AHI of 1.9/h and O2 heidi of 86%.  He did achieve REM sleep in supine position on this setting.  There was some noted nocturnal hypoxia during study but will further evaluate once he is acclimated therapy.    CNOX results 3/31/2023 using BiPAP 17/12 cm indicated saturations less than 90% for 74.3% of recording  time, O2 heidi 82%, 117.6 minutes less than 88% ox saturation.       ROS: As per HPI and otherwise negative if not stated.    Past Medical History:   Diagnosis Date    Adrenal nodule (HCC) 01/31/2022    Angiomyolipoma of right kidney 8/16/2023    Arthritis     Osteo-generalized    Chickenpox     Chronic kidney disease (CKD), stage III (moderate) 01/12/2017    Chronic lower back pain 06/23/2015    Chronic venous insufficiency 09/04/2014    Decreased hearing of right ear 10/01/2020    Dental disorder 12/17/2014    had two extracted last week 12/09/14=were infected     Dermatitis, seborrheic 06/23/2015     IMO load March 2020    GERD (gastroesophageal reflux disease)     Headache, classical migraine     HTN (hypertension) 09/04/2014    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;  Service: General    LAPAROSCOPIC LYSIS OF ADHESIONS  8/7/2019    Procedure: LYSIS, ADHESIONS, LAPAROSCOPIC;  Surgeon: Julian Carmen M.D.;  Location: [facility];  Service: General    INCISION HERNIA REPAIR  8/7/2019    Procedure: REPAIR, HERNIA, INCISIONAL- POSSIBLE;  Surgeon: Julian Carmen M.D.;  Location: SURGERY [facility];  Service: General    VENTRAL HERNIA REPAIR LAPAROSCOPIC  10/1/2018    Procedure: VENTRAL HERNIA REPAIR LAPAROSCOPIC- FOR INCISIONAL HERNIA WITH MESH;  Surgeon: Julian Carmen M.D.;  Location: [facility];  Service: General    GASTRIC SLEEVE LAPAROSCOPY  12/29/2014    Performed by Julian  BOBBY Carmen M.D. at SURGERY [facility]    KNEE REPLACEMENT, TOTAL  2012    left    OTHER ORTHOPEDIC SURGERY  1998    bicep tendon repair    APPENDECTOMY      ARTHROSCOPY, KNEE      CHOLECYSTECTOMY      SLEEVE,ANA VASO THIGH      TONSILLECTOMY         Family History   Problem Relation Age of Onset    Diabetes Mother     Arthritis Mother     Lung Disease Father     Arthritis Father     Heart Disease Father     Prostate cancer Father     Hypertension Father     Alcohol/Drug Brother        Social History     Socioeconomic History    Marital status:      Spouse name: Not on file    Number of children: Not on file    Years of education: Not on file    Highest education level: Associate degree: occupational, technical, or vocational program   Occupational History    Not on file   Tobacco Use    Smoking status: Former     Current packs/day: 0.00     Average packs/day: 1 pack/day for 25.0 years (25.0 ttl pk-yrs)     Types: Cigarettes     Start date: 1969     Quit date: 1994     Years since quittin.3    Smokeless tobacco: Never   Vaping Use    Vaping Use: Never used   Substance and Sexual Activity    Alcohol use: Not Currently     Alcohol/week: 0.0 oz     Comment: one a month    Drug use: No    Sexual activity: Yes     Partners: Female   Other Topics Concern    Not on file   Social History Narrative    Not on file     Social Determinants of Health     Financial Resource Strain: Low Risk  (2023)    Overall Financial Resource Strain (CARDIA)     Difficulty of Paying Living Expenses: Not hard at all   Food Insecurity: No Food Insecurity (2023)    Hunger Vital Sign     Worried About Running Out of Food in the Last Year: Never true     Ran Out of Food in the Last Year: Never true   Transportation Needs: No Transportation Needs (2023)    PRAPARE - Transportation     Lack of Transportation (Medical): No     Lack of Transportation (Non-Medical): No   Physical Activity: Inactive (2023)     "Exercise Vital Sign     Days of Exercise per Week: 0 days     Minutes of Exercise per Session: 0 min   Stress: No Stress Concern Present (8/13/2023)    [employer] - Occupational Stress Questionnaire     Feeling of Stress : Not at all   Social Connections: Socially Integrated (8/13/2023)    Social Connection and Isolation Panel [NHANES]     Frequency of Communication with Friends and Family: More than three times a week     Frequency of Social Gatherings with Friends and Family: Twice a week     Attends Confucianism Services: More than 4 times per year     Active Member of Clubs or Organizations: Yes     Attends Club or Organization Meetings: Never     Marital Status:    Intimate Partner Violence: Not on file   Housing Stability: Low Risk  (8/13/2023)    Housing Stability Vital Sign     Unable to Pay for Housing in the Last Year: No     Number of Places Lived in the Last Year: 1     Unstable Housing in the Last Year: No       Allergies as of 05/14/2024    (No Known Allergies)        Vitals:  /84 (BP Location: Left arm, Patient Position: Sitting, BP Cuff Size: Adult)   Pulse 82   Resp 16   Ht 1.854 m (6' 1\")   Wt (!) 143 kg (315 lb)   SpO2 94%     Current medications as of today   Current Outpatient Medications   Medication Sig Dispense Refill    triamterene-hctz (MAXZIDE-25/DYAZIDE) 37.5-25 MG Tab Take 1 Tablet by mouth every day. 100 Tablet 3    allopurinol (ZYLOPRIM) 300 MG Tab Take 1 Tablet by mouth every day. 100 Tablet 3    omeprazole (PRILOSEC) 20 MG delayed-release capsule Take 1 Capsule by mouth every day. 100 Capsule 3    amoxicillin (AMOXIL) 500 MG Cap TAKE 4 CAPS BY MOUTH before dentist appointment 30 capsule 0    multivitamin (THERAGRAN) Tab Take 1 Tab by mouth every day.      acetaminophen (TYLENOL) 500 MG Tab Take 1,000 mg by mouth every 6 hours as needed (MIGRAINE).      vitamin D (CHOLECALCIFEROL) 1000 UNIT Tab Take 2,000 Units by mouth every day.       No " current facility-administered medications for this visit.         Physical Exam:   Gen:           Alert and oriented, No apparent distress. Mood and affect appropriate, normal interaction with examiner.  Eyes:          PERRL, EOM intact, sclere white, conjunctive moist. Glasses.  Ears:          Not examined.   Hearing:     Grossly intact.  Nose:          Normal, no lesions or deformities.  Dentition:    Not examined.   Oropharynx:   Not examined.   Mallampati Classification: Not examined.   Neck:        Supple, trachea midline, no masses.  Respiratory Effort: No intercostal retractions or use of accessory muscles.   Lung Auscultation:      Clear to auscultation bilaterally; no rales, rhonchi or wheezing.  CV:            Regular rate and rhythm. No murmurs, rubs or gallops.  Abd:           Not examined.   Lymphadenopathy: Not examined.  Gait and Station: Normal.  Digits and Nails: No clubbing, cyanosis, petechiae, or nodes.   Cranial Nerves: II-XII grossly intact.  Skin:        No rashes, lesions or ulcers noted.               Ext:           BLE compression stockings with knee supports in place      Assessment:  1. Obstructive sleep apnea        2. Primary hypertension        3. Morbid obesity with BMI of 40.0-44.9, adult (HCC)  HEIGHT AND WEIGHT      4. Former smoker            Immunizations:    Flu:11/26/23  Pneumovax 23:2020  Prevnar 13:2019  PCV 20: 2023  COVID-19: 9/25/23    Plan:  DEDRA is well-controlled.  He will continue to benefit from BiPAP 15/10 cm with 2 L oxygen bleed in.  Recommend updating overnight oximetry on current settings to verify nocturnal hypoxia is resolved.  May send results via [employer].   DME mask/supplies  Follow primary care for ongoing management hypertension.  Encouraged weight loss or healthy diet and activity.  Follow-up in 1 year with compliance report, sooner if needed.    Please note that this dictation was created using voice recognition software. I have made every reasonable  attempt to correct obvious errors, but it is possible there are errors of grammar and possibly content that I did not discover before finalizing the note.

## 2024-06-04 ENCOUNTER — HOME STUDY (OUTPATIENT)
Dept: SLEEP MEDICINE | Facility: MEDICAL CENTER | Age: 69
End: 2024-06-04
Attending: NURSE PRACTITIONER
Payer: MEDICARE

## 2024-06-04 DIAGNOSIS — G47.33 OBSTRUCTIVE SLEEP APNEA: Chronic | ICD-10-CM

## 2024-06-04 PROCEDURE — 94762 N-INVAS EAR/PLS OXIMTRY CONT: CPT | Performed by: PREVENTIVE MEDICINE

## 2024-06-10 ENCOUNTER — PATIENT MESSAGE (OUTPATIENT)
Dept: SLEEP MEDICINE | Facility: MEDICAL CENTER | Age: 69
End: 2024-06-10
Payer: MEDICARE

## 2024-06-10 NOTE — PROGRESS NOTES
Overnight oximetry using BiPAP 15/10 cm at 2 L oxygen bleed and indicated basal SpO2 of 91.5% with 0.2 minutes less than 88% ox saturation.  Adequate oxygenation on current settings.

## 2024-06-10 NOTE — PATIENT COMMUNICATION
Pt completed an OPO on 6/4/2024 and results are scan into media, however interp is not yet done by Dr. Canales.

## 2024-06-12 DIAGNOSIS — Z96.652 HISTORY OF TOTAL LEFT KNEE REPLACEMENT: ICD-10-CM

## 2024-06-12 DIAGNOSIS — Z79.2 PROPHYLACTIC ANTIBIOTIC: ICD-10-CM

## 2024-06-12 RX ORDER — AMOXICILLIN 500 MG/1
CAPSULE ORAL
Qty: 30 CAPSULE | Refills: 0 | Status: SHIPPED | OUTPATIENT
Start: 2024-06-12

## 2024-06-23 NOTE — PROCEDURES
After Visit Summary   8/15/2018    Mono Del Valle    MRN: [id number]           Patient Information     Date Of Birth          1937        Visit Information        Provider Department      8/15/2018 9:00 AM Marycruz Roberts AuD M [facility]        Today's Diagnoses     Mixed hearing loss, bilateral    -  1       Follow-ups after your visit        Your next 10 appointments already scheduled     Sep 24, 2018  8:00 AM CDT   Cochlear Implant Brief with Miriam Carrillo [facility] ([facility])    [address] [place] [id number] [phone]              Who to contact     Please call your clinic at [phone] to:    Ask questions about your health    Make or cancel appointments    Discuss your medicines    Learn about your test results    Speak to your doctor            Additional Information About Your Visit        MyChart Information     [employer] gives you secure access to your electronic health record. If you see a primary care provider, you can also send messages to your care team and make appointments. If you have questions, please call your primary care clinic.  If you do not have a primary care provider, please call [phone] and they will assist you.      [employer] is an electronic gateway that provides easy, online access to your medical records. With [employer], you can request a clinic appointment, read your test results, renew a prescription or communicate with your care team.     To access your existing account, please contact your [facility] or call [phone] for assistance.        Care EveryWhere ID     This is your Care EveryWhere ID. This could be used by other organizations to access your [facility] medical records  [id number]         Blood Pressure from Last 3 Encounters:   02/11/15 145/85   12/05/12 133/84   11/14/12 117/73    Weight from Last 3 Encounters:   02/09/15 66.7 kg (147 lb)       OXIMETRY Interpretation:    This overnight oximetry was recorded on 6/4/2024 with the patient on BIPAP 15/10 and 2LPM O2.  The analysis duration was 5 hrs, 57 mins. 13 total oximetric events occurred encompassing 14.7 minutes .  The average event duration was  68 seconds .  The saturations were less than 90% for 3.5% of the recording.  The heidi saturation was 87% .  The patient spent 0.2 minutes with saturations < 88%.  Overall, this continuous nocturnal oximetry demonstrates adequate oxygen saturation while on positive airway pressure therapy.      Recommendation:  Continue current PAP therapy and oxygen supplementation.    Clinical correlation is needed.      12/05/12 67.1 kg (148 lb)   03/19/12 66.8 kg (147 lb 4.3 oz)              We Performed the Following     RT: Diagnostic Analysis of CI 7 yrs & over, Initial Programming ([id number])        Primary Care Provider Office Phone # Fax #    Edgardo Das [phone] [address] [place] [address]        Equal Access to Services     [facility] CenterBRENDA : Hadii aad ku hadasho Soomaali, waaxda luqadaha, qaybta kaalmada adeegyada, waxay idiin hayaan adeeg kharash la'aan . So [facility] [phone].    ATENCIÓN: Si charly santamaria, tiene a jimenez disposición servicios gratuitos de asistencia lingüística. Llame al [phone].    We comply with applicable federal civil rights laws and [place] laws. We do not discriminate on the basis of race, color, national origin, age, disability, sex, sexual orientation, or gender identity.            Thank you!     Thank you for choosing [facility]  for your care. Our goal is always to provide you with excellent care. Hearing back from our patients is one way we can continue to improve our services. Please take a few minutes to complete the written survey that you may receive in the mail after your visit with us. Thank you!             Your Updated Medication List - Protect others around you: Learn how to safely use, store and throw away your medicines at www.disposemymeds.org.          This list is accurate as of 8/15/18 10:27 AM.  Always use your most recent med list.                   Brand Name Dispense Instructions for use Diagnosis    BENADRYL PO      Take 25 mg by mouth At Bedtime        calcium carbonate 500 MG chewable tablet    TUMS     Take 2 chew tab by mouth as needed for heartburn        CENTRUM PO      Take 1 tablet by mouth daily.        docusate sodium 100 MG tablet    COLACE    30 tablet    Take 100 mg by mouth daily    BPH (benign prostatic hypertrophy) with urinary obstruction       FINASTERIDE PO      Take 5 mg by mouth daily         HYDROcodone-acetaminophen 5-325 MG per tablet    NORCO    30 tablet    Take 1 tablet by mouth every 6 hours as needed for moderate to severe pain    BPH (benign prostatic hypertrophy) with urinary obstruction       loratadine 10 MG tablet    CLARITIN     Take 10 mg by mouth daily        simvastatin 20 MG tablet    ZOCOR     Take 20 mg by mouth At Bedtime.        TAMSULOSIN HCL PO      Take 0.4 mg by mouth.

## 2024-08-01 ENCOUNTER — DOCUMENTATION (OUTPATIENT)
Dept: HEALTH INFORMATION MANAGEMENT | Facility: OTHER | Age: 69
End: 2024-08-01
Payer: MEDICARE

## 2024-08-04 DIAGNOSIS — D50.9 IRON DEFICIENCY ANEMIA, UNSPECIFIED IRON DEFICIENCY ANEMIA TYPE: ICD-10-CM

## 2024-08-04 DIAGNOSIS — R73.01 IFG (IMPAIRED FASTING GLUCOSE): ICD-10-CM

## 2024-08-04 DIAGNOSIS — N18.31 STAGE 3A CHRONIC KIDNEY DISEASE: ICD-10-CM

## 2024-08-04 DIAGNOSIS — M1A.09X1 IDIOPATHIC CHRONIC GOUT OF MULTIPLE SITES WITH TOPHUS: ICD-10-CM

## 2024-08-04 DIAGNOSIS — I10 ESSENTIAL HYPERTENSION: ICD-10-CM

## 2024-08-04 DIAGNOSIS — E78.5 HYPERLIPIDEMIA LDL GOAL <100: ICD-10-CM

## 2024-08-05 ENCOUNTER — HOSPITAL ENCOUNTER (OUTPATIENT)
Dept: LAB | Facility: MEDICAL CENTER | Age: 69
End: 2024-08-05
Attending: STUDENT IN AN ORGANIZED HEALTH CARE EDUCATION/TRAINING PROGRAM
Payer: MEDICARE

## 2024-08-05 ENCOUNTER — HOSPITAL ENCOUNTER (OUTPATIENT)
Dept: LAB | Facility: MEDICAL CENTER | Age: 69
End: 2024-08-05
Attending: NURSE PRACTITIONER
Payer: MEDICARE

## 2024-08-05 DIAGNOSIS — I10 ESSENTIAL HYPERTENSION: ICD-10-CM

## 2024-08-05 DIAGNOSIS — N18.31 STAGE 3A CHRONIC KIDNEY DISEASE: ICD-10-CM

## 2024-08-05 DIAGNOSIS — M1A.09X1 IDIOPATHIC CHRONIC GOUT OF MULTIPLE SITES WITH TOPHUS: ICD-10-CM

## 2024-08-05 DIAGNOSIS — E78.5 HYPERLIPIDEMIA LDL GOAL <100: ICD-10-CM

## 2024-08-05 DIAGNOSIS — R73.01 IFG (IMPAIRED FASTING GLUCOSE): ICD-10-CM

## 2024-08-05 LAB
ALBUMIN SERPL BCP-MCNC: 4.1 G/DL (ref 3.2–4.9)
ALBUMIN/GLOB SERPL: 1.5 G/DL
ALP SERPL-CCNC: 50 U/L (ref 30–99)
ALT SERPL-CCNC: 30 U/L (ref 2–50)
ANION GAP SERPL CALC-SCNC: 15 MMOL/L (ref 7–16)
AST SERPL-CCNC: 30 U/L (ref 12–45)
BASOPHILS # BLD AUTO: 0.4 % (ref 0–1.8)
BASOPHILS # BLD: 0.03 K/UL (ref 0–0.12)
BILIRUB SERPL-MCNC: 1.5 MG/DL (ref 0.1–1.5)
BUN SERPL-MCNC: 24 MG/DL (ref 8–22)
CALCIUM ALBUM COR SERPL-MCNC: 9.3 MG/DL (ref 8.5–10.5)
CALCIUM SERPL-MCNC: 9.4 MG/DL (ref 8.5–10.5)
CHLORIDE SERPL-SCNC: 104 MMOL/L (ref 96–112)
CHOLEST SERPL-MCNC: 176 MG/DL (ref 100–199)
CO2 SERPL-SCNC: 22 MMOL/L (ref 20–33)
CREAT SERPL-MCNC: 1.16 MG/DL (ref 0.5–1.4)
CREAT UR-MCNC: 148.51 MG/DL
EOSINOPHIL # BLD AUTO: 0.21 K/UL (ref 0–0.51)
EOSINOPHIL NFR BLD: 3 % (ref 0–6.9)
ERYTHROCYTE [DISTWIDTH] IN BLOOD BY AUTOMATED COUNT: 47.8 FL (ref 35.9–50)
EST. AVERAGE GLUCOSE BLD GHB EST-MCNC: 108 MG/DL
FASTING STATUS PATIENT QL REPORTED: NORMAL
GFR SERPLBLD CREATININE-BSD FMLA CKD-EPI: 68 ML/MIN/1.73 M 2
GLOBULIN SER CALC-MCNC: 2.7 G/DL (ref 1.9–3.5)
GLUCOSE SERPL-MCNC: 99 MG/DL (ref 65–99)
HBA1C MFR BLD: 5.4 % (ref 4–5.6)
HCT VFR BLD AUTO: 45.5 % (ref 42–52)
HDLC SERPL-MCNC: 40 MG/DL
HGB BLD-MCNC: 15.7 G/DL (ref 14–18)
IMM GRANULOCYTES # BLD AUTO: 0.01 K/UL (ref 0–0.11)
IMM GRANULOCYTES NFR BLD AUTO: 0.1 % (ref 0–0.9)
LDLC SERPL CALC-MCNC: 75 MG/DL
LYMPHOCYTES # BLD AUTO: 1.75 K/UL (ref 1–4.8)
LYMPHOCYTES NFR BLD: 25.1 % (ref 22–41)
MCH RBC QN AUTO: 33.3 PG (ref 27–33)
MCHC RBC AUTO-ENTMCNC: 34.5 G/DL (ref 32.3–36.5)
MCV RBC AUTO: 96.4 FL (ref 81.4–97.8)
MICROALBUMIN UR-MCNC: <1.2 MG/DL
MICROALBUMIN/CREAT UR: NORMAL MG/G (ref 0–30)
MONOCYTES # BLD AUTO: 0.62 K/UL (ref 0–0.85)
MONOCYTES NFR BLD AUTO: 8.9 % (ref 0–13.4)
NEUTROPHILS # BLD AUTO: 4.34 K/UL (ref 1.82–7.42)
NEUTROPHILS NFR BLD: 62.5 % (ref 44–72)
NRBC # BLD AUTO: 0 K/UL
NRBC BLD-RTO: 0 /100 WBC (ref 0–0.2)
PLATELET # BLD AUTO: 152 K/UL (ref 164–446)
PMV BLD AUTO: 10.5 FL (ref 9–12.9)
POTASSIUM SERPL-SCNC: 3.7 MMOL/L (ref 3.6–5.5)
PROT SERPL-MCNC: 6.8 G/DL (ref 6–8.2)
PSA SERPL-MCNC: 4.13 NG/ML (ref 0–4)
RBC # BLD AUTO: 4.72 M/UL (ref 4.7–6.1)
SODIUM SERPL-SCNC: 141 MMOL/L (ref 135–145)
TRIGL SERPL-MCNC: 307 MG/DL (ref 0–149)
URATE SERPL-MCNC: 5.3 MG/DL (ref 2.5–8.3)
WBC # BLD AUTO: 7 K/UL (ref 4.8–10.8)

## 2024-08-05 PROCEDURE — 36415 COLL VENOUS BLD VENIPUNCTURE: CPT

## 2024-08-05 PROCEDURE — 80061 LIPID PANEL: CPT

## 2024-08-05 PROCEDURE — 82043 UR ALBUMIN QUANTITATIVE: CPT

## 2024-08-05 PROCEDURE — 84153 ASSAY OF PSA TOTAL: CPT

## 2024-08-05 PROCEDURE — 82570 ASSAY OF URINE CREATININE: CPT

## 2024-08-05 PROCEDURE — 85025 COMPLETE CBC W/AUTO DIFF WBC: CPT

## 2024-08-05 PROCEDURE — 84550 ASSAY OF BLOOD/URIC ACID: CPT

## 2024-08-05 PROCEDURE — 83036 HEMOGLOBIN GLYCOSYLATED A1C: CPT

## 2024-08-05 PROCEDURE — 80053 COMPREHEN METABOLIC PANEL: CPT

## 2024-08-10 SDOH — HEALTH STABILITY: PHYSICAL HEALTH: ON AVERAGE, HOW MANY MINUTES DO YOU ENGAGE IN EXERCISE AT THIS LEVEL?: 30 MIN

## 2024-08-10 SDOH — ECONOMIC STABILITY: FOOD INSECURITY: WITHIN THE PAST 12 MONTHS, YOU WORRIED THAT YOUR FOOD WOULD RUN OUT BEFORE YOU GOT MONEY TO BUY MORE.: NEVER TRUE

## 2024-08-10 SDOH — ECONOMIC STABILITY: INCOME INSECURITY: HOW HARD IS IT FOR YOU TO PAY FOR THE VERY BASICS LIKE FOOD, HOUSING, MEDICAL CARE, AND HEATING?: NOT HARD AT ALL

## 2024-08-10 SDOH — ECONOMIC STABILITY: FOOD INSECURITY: WITHIN THE PAST 12 MONTHS, THE FOOD YOU BOUGHT JUST DIDN'T LAST AND YOU DIDN'T HAVE MONEY TO GET MORE.: NEVER TRUE

## 2024-08-10 SDOH — ECONOMIC STABILITY: INCOME INSECURITY: IN THE LAST 12 MONTHS, WAS THERE A TIME WHEN YOU WERE NOT ABLE TO PAY THE MORTGAGE OR RENT ON TIME?: NO

## 2024-08-10 SDOH — HEALTH STABILITY: PHYSICAL HEALTH: ON AVERAGE, HOW MANY DAYS PER WEEK DO YOU ENGAGE IN MODERATE TO STRENUOUS EXERCISE (LIKE A BRISK WALK)?: 2 DAYS

## 2024-08-10 SDOH — ECONOMIC STABILITY: HOUSING INSECURITY: IN THE LAST 12 MONTHS, HOW MANY PLACES HAVE YOU LIVED?: 1

## 2024-08-10 ASSESSMENT — LIFESTYLE VARIABLES
HOW MANY STANDARD DRINKS CONTAINING ALCOHOL DO YOU HAVE ON A TYPICAL DAY: PATIENT DOES NOT DRINK
SKIP TO QUESTIONS 9-10: 1
HOW OFTEN DO YOU HAVE SIX OR MORE DRINKS ON ONE OCCASION: NEVER
HOW MANY STANDARD DRINKS CONTAINING ALCOHOL DO YOU HAVE ON A TYPICAL DAY: PATIENT DOES NOT DRINK
SKIP TO QUESTIONS 9-10: 1
AUDIT-C TOTAL SCORE: 0
HOW OFTEN DO YOU HAVE SIX OR MORE DRINKS ON ONE OCCASION: NEVER
HOW OFTEN DO YOU HAVE A DRINK CONTAINING ALCOHOL: NEVER
AUDIT-C TOTAL SCORE: 0
HOW OFTEN DO YOU HAVE A DRINK CONTAINING ALCOHOL: NEVER

## 2024-08-10 ASSESSMENT — SOCIAL DETERMINANTS OF HEALTH (SDOH)
IN A TYPICAL WEEK, HOW MANY TIMES DO YOU TALK ON THE PHONE WITH FAMILY, FRIENDS, OR NEIGHBORS?: MORE THAN THREE TIMES A WEEK
IN A TYPICAL WEEK, HOW MANY TIMES DO YOU TALK ON THE PHONE WITH FAMILY, FRIENDS, OR NEIGHBORS?: MORE THAN THREE TIMES A WEEK
HOW OFTEN DO YOU GET TOGETHER WITH FRIENDS OR RELATIVES?: THREE TIMES A WEEK
HOW OFTEN DO YOU ATTEND CHURCH OR RELIGIOUS SERVICES?: NEVER
IN A TYPICAL WEEK, HOW MANY TIMES DO YOU TALK ON THE PHONE WITH FAMILY, FRIENDS, OR NEIGHBORS?: MORE THAN THREE TIMES A WEEK
HOW HARD IS IT FOR YOU TO PAY FOR THE VERY BASICS LIKE FOOD, HOUSING, MEDICAL CARE, AND HEATING?: NOT HARD AT ALL
HOW OFTEN DO YOU ATTEND CHURCH OR RELIGIOUS SERVICES?: NEVER
HOW MANY DRINKS CONTAINING ALCOHOL DO YOU HAVE ON A TYPICAL DAY WHEN YOU ARE DRINKING: PATIENT DOES NOT DRINK
DO YOU BELONG TO ANY CLUBS OR ORGANIZATIONS SUCH AS CHURCH GROUPS UNIONS, FRATERNAL OR ATHLETIC GROUPS, OR SCHOOL GROUPS?: NO
HOW OFTEN DO YOU GET TOGETHER WITH FRIENDS OR RELATIVES?: THREE TIMES A WEEK
DO YOU BELONG TO ANY CLUBS OR ORGANIZATIONS SUCH AS CHURCH GROUPS UNIONS, FRATERNAL OR ATHLETIC GROUPS, OR SCHOOL GROUPS?: NO
DO YOU BELONG TO ANY CLUBS OR ORGANIZATIONS SUCH AS CHURCH GROUPS UNIONS, FRATERNAL OR ATHLETIC GROUPS, OR SCHOOL GROUPS?: NO
HOW OFTEN DO YOU ATTENT MEETINGS OF THE CLUB OR ORGANIZATION YOU BELONG TO?: NEVER
IN THE PAST 12 MONTHS, HAS THE ELECTRIC, GAS, OIL, OR WATER COMPANY THREATENED TO SHUT OFF SERVICE IN YOUR HOME?: NO
HOW OFTEN DO YOU ATTENT MEETINGS OF THE CLUB OR ORGANIZATION YOU BELONG TO?: NEVER
IN THE PAST 12 MONTHS, HAS THE ELECTRIC, GAS, OIL, OR WATER COMPANY THREATENED TO SHUT OFF SERVICE IN YOUR HOME?: NO
WITHIN THE PAST 12 MONTHS, YOU WORRIED THAT YOUR FOOD WOULD RUN OUT BEFORE YOU GOT THE MONEY TO BUY MORE: NEVER TRUE
HOW OFTEN DO YOU HAVE SIX OR MORE DRINKS ON ONE OCCASION: NEVER
HOW OFTEN DO YOU ATTEND CHURCH OR RELIGIOUS SERVICES?: NEVER
HOW OFTEN DO YOU HAVE A DRINK CONTAINING ALCOHOL: NEVER
HOW OFTEN DO YOU ATTENT MEETINGS OF THE CLUB OR ORGANIZATION YOU BELONG TO?: NEVER
HOW OFTEN DO YOU GET TOGETHER WITH FRIENDS OR RELATIVES?: THREE TIMES A WEEK

## 2024-08-13 ENCOUNTER — APPOINTMENT (OUTPATIENT)
Dept: MEDICAL GROUP | Facility: MEDICAL CENTER | Age: 69
End: 2024-08-13
Payer: MEDICARE

## 2024-08-26 SDOH — ECONOMIC STABILITY: INCOME INSECURITY: IN THE LAST 12 MONTHS, WAS THERE A TIME WHEN YOU WERE NOT ABLE TO PAY THE MORTGAGE OR RENT ON TIME?: NO

## 2024-08-26 SDOH — HEALTH STABILITY: PHYSICAL HEALTH: ON AVERAGE, HOW MANY DAYS PER WEEK DO YOU ENGAGE IN MODERATE TO STRENUOUS EXERCISE (LIKE A BRISK WALK)?: 2 DAYS

## 2024-08-26 SDOH — ECONOMIC STABILITY: INCOME INSECURITY: HOW HARD IS IT FOR YOU TO PAY FOR THE VERY BASICS LIKE FOOD, HOUSING, MEDICAL CARE, AND HEATING?: NOT HARD AT ALL

## 2024-08-26 SDOH — ECONOMIC STABILITY: FOOD INSECURITY: WITHIN THE PAST 12 MONTHS, THE FOOD YOU BOUGHT JUST DIDN'T LAST AND YOU DIDN'T HAVE MONEY TO GET MORE.: NEVER TRUE

## 2024-08-26 SDOH — ECONOMIC STABILITY: FOOD INSECURITY: WITHIN THE PAST 12 MONTHS, YOU WORRIED THAT YOUR FOOD WOULD RUN OUT BEFORE YOU GOT MONEY TO BUY MORE.: NEVER TRUE

## 2024-08-26 SDOH — HEALTH STABILITY: PHYSICAL HEALTH: ON AVERAGE, HOW MANY MINUTES DO YOU ENGAGE IN EXERCISE AT THIS LEVEL?: 30 MIN

## 2024-08-26 ASSESSMENT — SOCIAL DETERMINANTS OF HEALTH (SDOH)
HOW HARD IS IT FOR YOU TO PAY FOR THE VERY BASICS LIKE FOOD, HOUSING, MEDICAL CARE, AND HEATING?: NOT HARD AT ALL
HOW MANY DRINKS CONTAINING ALCOHOL DO YOU HAVE ON A TYPICAL DAY WHEN YOU ARE DRINKING: PATIENT DOES NOT DRINK
HOW OFTEN DO YOU ATTENT MEETINGS OF THE CLUB OR ORGANIZATION YOU BELONG TO?: NEVER
IN A TYPICAL WEEK, HOW MANY TIMES DO YOU TALK ON THE PHONE WITH FAMILY, FRIENDS, OR NEIGHBORS?: MORE THAN THREE TIMES A WEEK
HOW OFTEN DO YOU HAVE SIX OR MORE DRINKS ON ONE OCCASION: NEVER
HOW OFTEN DO YOU ATTENT MEETINGS OF THE CLUB OR ORGANIZATION YOU BELONG TO?: NEVER
HOW OFTEN DO YOU HAVE A DRINK CONTAINING ALCOHOL: NEVER
DO YOU BELONG TO ANY CLUBS OR ORGANIZATIONS SUCH AS CHURCH GROUPS UNIONS, FRATERNAL OR ATHLETIC GROUPS, OR SCHOOL GROUPS?: NO
HOW OFTEN DO YOU GET TOGETHER WITH FRIENDS OR RELATIVES?: THREE TIMES A WEEK
DO YOU BELONG TO ANY CLUBS OR ORGANIZATIONS SUCH AS CHURCH GROUPS UNIONS, FRATERNAL OR ATHLETIC GROUPS, OR SCHOOL GROUPS?: NO
IN A TYPICAL WEEK, HOW MANY TIMES DO YOU TALK ON THE PHONE WITH FAMILY, FRIENDS, OR NEIGHBORS?: MORE THAN THREE TIMES A WEEK
HOW OFTEN DO YOU ATTEND CHURCH OR RELIGIOUS SERVICES?: NEVER
IN THE PAST 12 MONTHS, HAS THE ELECTRIC, GAS, OIL, OR WATER COMPANY THREATENED TO SHUT OFF SERVICE IN YOUR HOME?: NO
WITHIN THE PAST 12 MONTHS, YOU WORRIED THAT YOUR FOOD WOULD RUN OUT BEFORE YOU GOT THE MONEY TO BUY MORE: NEVER TRUE
HOW OFTEN DO YOU ATTEND CHURCH OR RELIGIOUS SERVICES?: NEVER
HOW OFTEN DO YOU GET TOGETHER WITH FRIENDS OR RELATIVES?: THREE TIMES A WEEK

## 2024-08-26 ASSESSMENT — LIFESTYLE VARIABLES
SKIP TO QUESTIONS 9-10: 1
AUDIT-C TOTAL SCORE: 0
HOW MANY STANDARD DRINKS CONTAINING ALCOHOL DO YOU HAVE ON A TYPICAL DAY: PATIENT DOES NOT DRINK
HOW OFTEN DO YOU HAVE A DRINK CONTAINING ALCOHOL: NEVER
HOW OFTEN DO YOU HAVE SIX OR MORE DRINKS ON ONE OCCASION: NEVER

## 2024-08-29 ENCOUNTER — OFFICE VISIT (OUTPATIENT)
Dept: MEDICAL GROUP | Facility: MEDICAL CENTER | Age: 69
End: 2024-08-29
Payer: MEDICARE

## 2024-08-29 VITALS
HEIGHT: 73 IN | HEART RATE: 86 BPM | SYSTOLIC BLOOD PRESSURE: 128 MMHG | RESPIRATION RATE: 19 BRPM | DIASTOLIC BLOOD PRESSURE: 85 MMHG | WEIGHT: 315 LBS | TEMPERATURE: 97.8 F | BODY MASS INDEX: 41.75 KG/M2 | OXYGEN SATURATION: 96 %

## 2024-08-29 DIAGNOSIS — D49.0 IPMN (INTRADUCTAL PAPILLARY MUCINOUS NEOPLASM): ICD-10-CM

## 2024-08-29 DIAGNOSIS — G43.909 MIGRAINE WITHOUT STATUS MIGRAINOSUS, NOT INTRACTABLE, UNSPECIFIED MIGRAINE TYPE: ICD-10-CM

## 2024-08-29 DIAGNOSIS — I10 PRIMARY HYPERTENSION: ICD-10-CM

## 2024-08-29 DIAGNOSIS — M1A.09X1 IDIOPATHIC CHRONIC GOUT OF MULTIPLE SITES WITH TOPHUS: ICD-10-CM

## 2024-08-29 DIAGNOSIS — L98.9 SKIN LESION OF FACE: ICD-10-CM

## 2024-08-29 DIAGNOSIS — D17.71 ANGIOMYOLIPOMA OF RIGHT KIDNEY: ICD-10-CM

## 2024-08-29 DIAGNOSIS — K21.9 GASTROESOPHAGEAL REFLUX DISEASE, UNSPECIFIED WHETHER ESOPHAGITIS PRESENT: ICD-10-CM

## 2024-08-29 DIAGNOSIS — R97.20 ELEVATED PSA: ICD-10-CM

## 2024-08-29 DIAGNOSIS — R59.0 CERVICAL LYMPHADENOPATHY: ICD-10-CM

## 2024-08-29 DIAGNOSIS — K43.2 INCISIONAL HERNIA, WITHOUT OBSTRUCTION OR GANGRENE: ICD-10-CM

## 2024-08-29 DIAGNOSIS — E66.01 MORBID OBESITY WITH BMI OF 40.0-44.9, ADULT (HCC): ICD-10-CM

## 2024-08-29 DIAGNOSIS — N18.31 STAGE 3A CHRONIC KIDNEY DISEASE: ICD-10-CM

## 2024-08-29 DIAGNOSIS — G47.33 OBSTRUCTIVE SLEEP APNEA: Chronic | ICD-10-CM

## 2024-08-29 DIAGNOSIS — R73.01 IFG (IMPAIRED FASTING GLUCOSE): ICD-10-CM

## 2024-08-29 PROCEDURE — 3079F DIAST BP 80-89 MM HG: CPT | Performed by: NURSE PRACTITIONER

## 2024-08-29 PROCEDURE — G0439 PPPS, SUBSEQ VISIT: HCPCS | Performed by: NURSE PRACTITIONER

## 2024-08-29 PROCEDURE — 3074F SYST BP LT 130 MM HG: CPT | Performed by: NURSE PRACTITIONER

## 2024-08-29 ASSESSMENT — ENCOUNTER SYMPTOMS: GENERAL WELL-BEING: GOOD

## 2024-08-29 ASSESSMENT — PATIENT HEALTH QUESTIONNAIRE - PHQ9: CLINICAL INTERPRETATION OF PHQ2 SCORE: 0

## 2024-08-29 ASSESSMENT — FIBROSIS 4 INDEX: FIB4 SCORE: 2.52

## 2024-08-29 ASSESSMENT — ACTIVITIES OF DAILY LIVING (ADL): BATHING_REQUIRES_ASSISTANCE: 0

## 2024-08-29 NOTE — PROGRESS NOTES
Chief Complaint   Patient presents with    Annual Exam       HPI:  Max Alberto is a 69 y.o. here for Medicare Annual Wellness Visit.  Has left preauricular skin lesion    Patient Active Problem List    Diagnosis Date Noted    Atherosclerosis of aorta (HCC) 01/10/2024    Angiomyolipoma of right kidney 08/16/2023    IFG (impaired fasting glucose) 08/16/2023    Chickenpox 08/15/2022    Liver hemangioma 01/31/2022    Adrenal nodule (HCC) 01/31/2022    Arthritis     Decreased hearing of right ear 10/01/2020    Morbid obesity with BMI of 40.0-44.9, adult (HCC) 09/04/2020    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/2018    Incisional hernia, without obstruction or gangrene 07/18/2017    Stage 3a chronic kidney disease 01/12/2017    Chronic lower back pain 06/23/2015    Dermatitis, seborrheic 06/23/2015    Dental disorder 12/17/2014    Tinnitus of both ears 10/13/2014    Chronic venous insufficiency 09/04/2014    HTN (hypertension) 09/04/2014    Migraines 09/04/2014    GERD (gastroesophageal reflux disease) 09/04/2014    Seasonal allergies 09/04/2014    Obstructive sleep apnea 09/04/2014       Current Outpatient Medications   Medication Sig Dispense Refill    amoxicillin (AMOXIL) 500 MG Cap TAKE 4 CAPS BY MOUTH before dentist appointment 30 Capsule 0    triamterene-hctz (MAXZIDE-25/DYAZIDE) 37.5-25 MG Tab Take 1 Tablet by mouth every day. 100 Tablet 3    allopurinol (ZYLOPRIM) 300 MG Tab Take 1 Tablet by mouth every day. 100 Tablet 3    omeprazole (PRILOSEC) 20 MG delayed-release capsule Take 1 Capsule by mouth every day. 100 Capsule 3    multivitamin (THERAGRAN) Tab Take 1 Tab by mouth every day.      acetaminophen (TYLENOL) 500 MG Tab Take 1,000 mg by mouth every 6 hours as needed (MIGRAINE).      vitamin D (CHOLECALCIFEROL) 1000 UNIT Tab Take 2,000 Units by mouth every day.       No current facility-administered medications for this visit.          Current supplements as per medication  list.     Allergies: Patient has no known allergies.    Current social contact/activities: walk    He  reports that he quit smoking about 30 years ago. His smoking use included cigarettes. He started smoking about 55 years ago. He has a 25 pack-year smoking history. He has never used smokeless tobacco. He reports that he does not currently use alcohol. He reports that he does not use drugs.  Counseling given: Not Answered      ROS:    Gait: Uses   Ostomy: No  Other tubes: Yes  Amputations: No  Chronic oxygen use: Yes  Last eye exam: 2023  Wears hearing aids: No   : Denies any urinary leakage during the last 6 months  Review of Systems   Constitutional: Negative.  Negative for fever, chills, weight loss, malaise/fatigue and diaphoresis.   HENT: Negative.  Negative for hearing loss, ear pain, nosebleeds, congestion, sore throat, neck pain, tinnitus and ear discharge.    Eyes: Negative.  Negative for blurred vision, double vision, photophobia, pain, discharge and redness.   Respiratory: Negative.  Negative for cough, hemoptysis, sputum production, shortness of breath, wheezing and stridor.    Cardiovascular: Negative.  Negative for chest pain, palpitations, orthopnea, claudication, leg swelling and PND.   Gastrointestinal: Negative.  Negative for heartburn, nausea, vomiting, abdominal pain, diarrhea, constipation, blood in stool and melena.   Genitourinary: Negative.  Negative for dysuria, urgency, frequency, incontinence, hematuria and flank pain.   Musculoskeletal: Negative.  Negative for myalgias, back pain and falls.   Skin: Negative.  Negative for itching and rash.   Neurological: Negative.  Negative for dizziness, tingling, tremors, sensory change, speech change, focal weakness, seizures, loss of consciousness, weakness and headaches.   Endo/Heme/Allergies: Negative.  Negative for environmental allergies and polydipsia. Does not bruise/bleed easily.   Psychiatric/Behavioral: Negative.  Negative for depression,  suicidal ideas, hallucinations, memory loss and substance abuse. The patient is not nervous/anxious and does not have insomnia.    All other systems reviewed and are negative.            Screening:  ***  Depression Screening  Little interest or pleasure in doing things?  0 - not at all  Feeling down, depressed , or hopeless? 0 - not at all  Patient Health Questionnaire Score: 0     If depressive symptoms identified deferred to follow up visit unless specifically addressed in assessment and plan.    Interpretation of PHQ-9 Total Score   Score Severity   1-4 No Depression   5-9 Mild Depression   10-14 Moderate Depression   15-19 Moderately Severe Depression   20-27 Severe Depression    Screening for Cognitive Impairment  Do you or any of your friends or family members have any concern about your memory? No  Three Minute Recall (Leader, Season, Table) 3/3    Vick clock face with all 12 numbers and set the hands to show 10 minutes after 11.  Yes    Cognitive concerns identified deferred for follow up unless specifically addressed in assessment and plan.    Fall Risk Assessment  Has the patient had two or more falls in the last year or any fall with injury in the last year?  No    Safety Assessment  Do you always wear your seatbelt?  Yes  Any changes to home needed to function safely? No  Difficulty hearing.  Yes  Patient counseled about all safety risks that were identified.    Functional Assessment ADLs  Are there any barriers preventing you from cooking for yourself or meeting nutritional needs?  No.    Are there any barriers preventing you from driving safely or obtaining transportation?  No.    Are there any barriers preventing you from using a telephone or calling for help?  No    Are there any barriers preventing you from shopping?  No.    Are there any barriers preventing you from taking care of your own finances?  No    Are there any barriers preventing you from managing your medications?  No    Are there any  barriers preventing you from showering, bathing or dressing yourself? No    Are there any barriers preventing you from doing housework or laundry? No  Are there any barriers preventing you from using the toilet?No  Are you currently engaging in any exercise or physical activity?  Yes.      Self-Assessment of Health  What is your perception of your health? Good    Do you sleep more than six hours a night? Yes    In the past 7 days, how much did pain keep you from doing your normal work? A lot    Do you spend quality time with family or friends (virtually or in person)? Yes    Do you usually eat a heart healthy diet that constists of a variety of fruits, vegetables, whole grains and fiber? Yes    Do you eat foods high in fat and/or Fast Food more than three times per week? No    How concerned are you that your medical conditions are not being well managed? Not at all    Are you worried that in the next 2 months, you may not have stable housing that you own, rent, or stay in as part of a household? No      Advance Care Planning  Do you have an Advance Directive, Living Will, Durable Power of , or POLST? Yes                 Health Maintenance Summary            Overdue - COVID-19 Vaccine (16 - 2023-24 season) Overdue since 1/25/2024 09/25/2023  Imm Admin: Comirnaty (Covid-19 Vaccine, Mrna, [id number] Formula)    10/03/2022  Imm Admin: PFIZER BIVALENT SARS-COV-2 VACCINE (12+)    10/03/2022  Imm Admin: PFIZER BIVALENT SARS-COV-2 VACCINE (12+)    04/20/2022  Imm Admin: COVID-19 Vaccine, unspecified - HISTORICAL DATA    04/20/2022  Imm Admin: COVID-19 Vaccine, unspecified - HISTORICAL DATA    Only the first 5 history entries have been loaded, but more history exists.              Overdue - Annual Wellness Visit (Yearly) Overdue since 8/16/2024 08/16/2023  Done    08/15/2022  Done    07/28/2021  Level of Service: IN ANNUAL WELLNESS VISIT-INCLUDES PPPS SUBSEQUE*    09/04/2020  Subsequent Annual Wellness  Visit - Includes PPPS ()    2020  Visit Dx: Medicare annual wellness visit, subsequent    Only the first 5 history entries have been loaded, but more history exists.              Influenza Vaccine (1) Next due on 2023  Imm Admin: Influenza Vaccine, Quadrivalent, Adjuvanted (Pf)    2023  Outside Immunization: Influenza, seasonal, injectable    2022  Imm Admin: Influenza Vaccine Adult HD    2021  Imm Admin: Influenza, Unspecified - HISTORICAL DATA    2021  Imm Admin: Influenza Vaccine-Flucelvax - HISTORICAL DATA    Only the first 5 history entries have been loaded, but more history exists.              IMM DTaP/Tdap/Td Vaccine (2 - Td or Tdap) Next due on 2015  Imm Admin: Tdap Vaccine              Colorectal Cancer Screening (Colonoscopy - Preferred) Next due on 3/19/2033      2023  AMB EXTERNAL COLONOSCOPY RESULTS    2023  AMB EXTERNAL COLONOSCOPY RESULTS    2018  REFERRAL TO GI FOR COLONOSCOPY              Zoster (Shingles) Vaccines (Series Information) Completed      2019  Imm Admin: Zoster Vaccine Recombinant (RZV) (SHINGRIX)    2018  Imm Admin: Zoster Vaccine Recombinant (RZV) (SHINGRIX)    11/15/2018  Imm Admin: Zoster Vaccine Recombinant (RZV) (SHINGRIX)    2016  Imm Admin: Zoster Vaccine Live (ZVL) (Zostavax) - HISTORICAL DATA              Hepatitis C Screening  Tentatively Complete      2020  Hepatitis C Antibody component of HCV Scrn ( [id number] 1xLife)              Abdominal Aortic Aneurysm (AAA) Screening  Tentatively Complete      2023  CT-ABDOMEN-PELVIS WITH    2019  CT-ABDOMEN-PELVIS WITH              Pneumococcal Vaccine: 65+ Years (Series Information) Completed      2023  Imm Admin: Pneumococcal Conjugate Vaccine (PCV20)    10/08/2020  Imm Admin: Pneumococcal polysaccharide vaccine (PPSV-23)    10/08/2020  Imm Admin: Pneumococcal Vaccine (UF) - HISTORICAL DATA     2019  Imm Admin: Pneumococcal Conjugate Vaccine (Prevnar/PCV-13)              Hepatitis A Vaccine (Hep A) (Series Information) Aged Out      No completion history exists for this topic.              Hepatitis B Vaccine (Hep B) (Series Information) Aged Out      No completion history exists for this topic.              HPV Vaccines (Series Information) Aged Out      No completion history exists for this topic.              Polio Vaccine (Inactivated Polio) (Series Information) Aged Out      No completion history exists for this topic.              Meningococcal Immunization (Series Information) Aged Out      No completion history exists for this topic.                    Patient Care Team:  RAFAEL Arana as PCP - General (Family Medicine)  Hyacinth Timmons M.D. as PCP - [facility] Paneled  Julian Carmen M.D. as Consulting Physician (Surgery)  Oscar Avila M.D. as Consulting Physician (Orthopedic Surgery)  Sulaiman Hook D.D.S. as Consulting Physician (Dentistry)  Bhavik Palumbo M.D. as Consulting Physician (Gastroenterology)  Man Gallo as [facility]   [employer] (DME Supplier)  [employer] as Respiratory Therapist (DME Services)        Social History     Tobacco Use    Smoking status: Former     Current packs/day: 0.00     Average packs/day: 1 pack/day for 25.0 years (25.0 ttl pk-yrs)     Types: Cigarettes     Start date: 1969     Quit date: 1994     Years since quittin.6    Smokeless tobacco: Never   Vaping Use    Vaping status: Never Used   Substance Use Topics    Alcohol use: Not Currently     Alcohol/week: 0.0 oz     Comment: one a month    Drug use: No     Family History   Problem Relation Age of Onset    Diabetes Mother     Arthritis Mother     Lung Disease Father     Arthritis Father     Heart Disease Father     Prostate cancer Father     Hypertension Father     Alcohol/Drug Brother      He  has a past medical history of Adrenal nodule (HCC) (2022),  Angiomyolipoma of right kidney (08/16/2023), Arthritis, Atherosclerosis of aorta (HCC) (01/10/2024), Chickenpox, Chronic kidney disease (CKD), stage III (moderate) (01/12/2017), Chronic lower back pain (06/23/2015), Chronic venous insufficiency (09/04/2014), Decreased hearing of right ear (10/01/2020), Dental disorder (12/17/2014), Dermatitis, seborrheic (06/23/2015), GERD (gastroesophageal reflux disease), Headache, classical migraine, HTN (hypertension) (09/04/2014), Idiopathic chronic gout of multiple sites with tophus (07/17/2018), IFG (impaired fasting glucose) (08/16/2023), Incisional hernia, without obstruction or gangrene (07/18/2017), Liver hemangioma (01/31/2022), Morbid obesity with BMI of 40.0-44.9, adult (HCC) (09/04/2020), Seasonal allergies (09/04/2014), Sleep apnea, and Tinnitus of both ears (10/13/2014).    He has no past medical history of Daytime sleepiness, Gasping for breath, Insomnia, Morning headache, or Snoring.   Past Surgical History:   Procedure Laterality Date    OK LAP,DIAGNOSTIC ABDOMEN  8/7/2019    Procedure: LAPAROSCOPY-DIAGNOSTIC;  Surgeon: Julian Carmen M.D.;  Location: [facility];  Service: General    LAPAROSCOPIC LYSIS OF ADHESIONS  8/7/2019    Procedure: LYSIS, ADHESIONS, LAPAROSCOPIC;  Surgeon: Julian Carmen M.D.;  Location: SURGERY [facility];  Service: General    INCISION HERNIA REPAIR  8/7/2019    Procedure: REPAIR, HERNIA, INCISIONAL- POSSIBLE;  Surgeon: Julian Carmen M.D.;  Location: SURGERY [facility];  Service: General    VENTRAL HERNIA REPAIR LAPAROSCOPIC  10/1/2018    Procedure: VENTRAL HERNIA REPAIR LAPAROSCOPIC- FOR INCISIONAL HERNIA WITH MESH;  Surgeon: Julian Carmen M.D.;  Location: [facility];  Service: General    GASTRIC SLEEVE LAPAROSCOPY  12/29/2014    Performed by Julian Carmen M.D. at [facility]    KNEE REPLACEMENT, TOTAL  2012    left    OTHER ORTHOPEDIC SURGERY  1998    bicep tendon repair    APPENDECTOMY       "ARTHROSCOPY, KNEE      CHOLECYSTECTOMY      SLEEVE,ANA VASO THIGH      TONSILLECTOMY         Exam:   /85   Pulse 86   Temp 36.6 °C (97.8 °F) (Temporal)   Resp 19   Ht 1.854 m (6' 1\")   Wt (!) 144 kg (317 lb)   SpO2 96%  Body mass index is 41.82 kg/m².  Hearing {GOOD/FAIR/POOR/EXCELLENT:61020}.    Dentition {DENTITION:98490}  Alert, oriented in no acute distress.  Eye contact is good, speech goal directed, affect calm  Physical Exam   Vitals reviewed.  Constitutional: oriented to person, place, and time. appears well-developed and well-nourished. No distress.   HENT: Head: Normocephalic and atraumatic. Bilateral tympanic membranes wnl w/o bulging.  Right Ear: External ear normal. Left Ear: External ear normal. Nose: Nose normal.  Mouth/Throat: Oropharynx is clear and moist. No oropharyngeal exudate. opal tm wnl. Eyes: Conjunctivae and EOM are normal. Pupils are equal, round, and reactive to light. Right eye exhibits no discharge. Left eye exhibits no discharge. No scleral icterus.    Neck: Normal range of motion. Neck supple. No JVD present.   Cardiovascular: Normal rate, regular rhythm, normal heart sounds and intact distal pulses.  Exam reveals no gallop and no friction rub.  1/6 murmur heard.  No carotid bruits   Pulmonary/Chest: Effort normal and breath sounds normal. No stridor. No respiratory distress. no wheezes or rales. exhibits no tenderness.   Abdominal: Soft. Bowel sounds are normal. exhibits no distension and no mass. No tenderness. no rebound and no guarding.   Musculoskeletal: Normal range of motion. exhibits no edema or tenderness.  opal pedal pulses 2+.  Lymphadenopathy:  no supraclavicular adenopathy. Left tender palp enlarged lymph node  Neurological: alert and oriented to person, place, and time. has normal reflexes. displays normal reflexes. No cranial nerve deficit. exhibits normal muscle tone. Coordination normal.   Skin: Skin is warm and dry. No rash noted. no diaphoresis. No " erythema. No pallor.   Psychiatric: normal mood and affect. behavior is normal.       Assessment and Plan. The following treatment and monitoring plan is recommended:  ***  There are no diagnoses linked to this encounter.    Services suggested: { AWV COORDINATION OF SERVICES:20405}  Health Care Screening: Age-appropriate preventive services recommended by USPTF and ACIP covered by Medicare were discussed today. Services ordered if indicated and agreed upon by the patient.  Referrals offered: Community-based lifestyle interventions to reduce health risks and promote self-management and wellness, fall prevention, nutrition, physical activity, tobacco-use cessation, weight loss, and mental health services as per orders if indicated.    Discussion today about general wellness and lifestyle habits:    Prevent falls and reduce trip hazards; Cautioned about securing or removing rugs.  Have a working fire alarm and carbon monoxide detector;   Engage in regular physical activity and social activities     Follow-up: No follow-ups on file.   normal.  Mouth/Throat: Oropharynx is clear and moist. No oropharyngeal exudate. opal tm wnl. Eyes: Conjunctivae and EOM are normal. Pupils are equal, round, and reactive to light. Right eye exhibits no discharge. Left eye exhibits no discharge. No scleral icterus.    Neck: Normal range of motion. Neck supple. No JVD present.   Cardiovascular: Normal rate, regular rhythm, normal heart sounds and intact distal pulses.  Exam reveals no gallop and no friction rub.  1/6 murmur heard.  No carotid bruits   Pulmonary/Chest: Effort normal and breath sounds normal. No stridor. No respiratory distress. no wheezes or rales. exhibits no tenderness.   Abdominal: Soft. Bowel sounds are normal. exhibits no distension and no mass. No tenderness. no rebound and no guarding.   Musculoskeletal: Normal range of motion. exhibits no edema or tenderness.  opal pedal pulses 2+.  Lymphadenopathy:  no supraclavicular adenopathy. Left tender palp enlarged lymph node  Neurological: alert and oriented to person, place, and time. has normal reflexes. displays normal reflexes. No cranial nerve deficit. exhibits normal muscle tone. Coordination normal.   Skin: Skin is warm and dry. No rash noted. no diaphoresis. No erythema. No pallor.   Psychiatric: normal mood and affect. behavior is normal.       Assessment and Plan. The following treatment and monitoring plan is recommended:    tory of Present Illness  SOCIAL HISTORY  The patient does not smoke.      Current medicines (including changes today)  Current Outpatient Medications   Medication Sig Dispense Refill    amoxicillin (AMOXIL) 500 MG Cap TAKE 4 CAPS BY MOUTH before dentist appointment 30 Capsule 0    triamterene-hctz (MAXZIDE-25/DYAZIDE) 37.5-25 MG Tab Take 1 Tablet by mouth every day. 100 Tablet 3    allopurinol (ZYLOPRIM) 300 MG Tab Take 1 Tablet by mouth every day. 100 Tablet 3    omeprazole (PRILOSEC) 20 MG delayed-release capsule Take 1 Capsule by mouth every day. 100 Capsule 3     multivitamin (THERAGRAN) Tab Take 1 Tab by mouth every day.      acetaminophen (TYLENOL) 500 MG Tab Take 1,000 mg by mouth every 6 hours as needed (MIGRAINE).      vitamin D (CHOLECALCIFEROL) 1000 UNIT Tab Take 2,000 Units by mouth every day.       No current facility-administered medications for this visit.     He  has a past medical history of Adrenal nodule ([facility]) (01/31/2022), Angiomyolipoma of right kidney (08/16/2023), Arthritis, Atherosclerosis of aorta ([facility]) (01/10/2024), Chickenpox, Chronic kidney disease (CKD), stage III (moderate) (01/12/2017), Chronic lower back pain (06/23/2015), Chronic venous insufficiency (09/04/2014), Decreased hearing of right ear (10/01/2020), Dental disorder (12/17/2014), Dermatitis, seborrheic (06/23/2015), Elevated PSA (08/29/2024), GERD (gastroesophageal reflux disease), Headache, classical migraine, HTN (hypertension) (09/04/2014), Idiopathic chronic gout of multiple sites with tophus (07/17/2018), IFG (impaired fasting glucose) (08/16/2023), Incisional hernia, without obstruction or gangrene (07/18/2017), IPMN (intraductal papillary mucinous neoplasm) (08/29/2024), Liver hemangioma (01/31/2022), Morbid obesity with BMI of 40.0-44.9, adult ([facility]) (09/04/2020), Seasonal allergies (09/04/2014), Sleep apnea, and Tinnitus of both ears (10/13/2014).    He has no past medical history of Daytime sleepiness, Gasping for breath, Insomnia, Morning headache, or Snoring.    Hemoglobin A1c:  Lab Results   Component Value Date/Time    HBA1C 5.4 08/05/2024 06:13 AM    HBA1C 5.4 01/02/2024 06:21 AM    HBA1C 5.8 (H) 02/08/2023 06:17 AM       Microalbumin/creatinine Ratio:  Lab Results   Component Value Date/Time    URCREAT 148.51 08/05/2024 0614    MICROALBUR <1.2 08/05/2024 0614    MALBCRT see below 08/05/2024 0614       Lipid Panel:  Lab Results   Component Value Date/Time    CHOLSTRLTOT 176 08/05/2024 0613    TRIGLYCERIDE 307 (H) 08/05/2024 0613    LDL 75 08/05/2024 0613     Complete Blood  Count:  Lab Results   Component Value Date/Time    WBC 7.0 08/05/2024 0613    RBC 4.72 08/05/2024 0613    HEMOGLOBIN 15.7 08/05/2024 0613    HEMATOCRIT 45.5 08/05/2024 0613    MCV 96.4 08/05/2024 0613    MCH 33.3 (H) 08/05/2024 0613    MCHC 34.5 08/05/2024 0613    RDW 47.8 08/05/2024 0613    MPV 10.5 08/05/2024 0613    LYMPHOCYTES 25.10 08/05/2024 0613    LYMPHS 1.75 08/05/2024 0613    MONOCYTES 8.90 08/05/2024 0613    MONOS 0.62 08/05/2024 0613    EOSINOPHILS 3.00 08/05/2024 0613    EOS 0.21 08/05/2024 0613    BASOPHILS 0.40 08/05/2024 0613    BASO 0.03 08/05/2024 0613    NRBC 0.00 08/05/2024 0613       Complete Metabolic Panel:  Lab Results   Component Value Date/Time    SODIUM 141 08/05/2024 06:13 AM    POTASSIUM 3.7 08/05/2024 06:13 AM    CHLORIDE 104 08/05/2024 06:13 AM    CO2 22 08/05/2024 06:13 AM    ANION 15.0 08/05/2024 06:13 AM    GLUCOSE 99 08/05/2024 06:13 AM    BUN 24 (H) 08/05/2024 06:13 AM    CREATININE 1.16 08/05/2024 06:13 AM    ASTSGOT 30 08/05/2024 06:13 AM    ALTSGPT 30 08/05/2024 06:13 AM    TBILIRUBIN 1.5 08/05/2024 06:13 AM    ALBUMIN 4.1 08/05/2024 06:13 AM    TOTPROTEIN 6.8 08/05/2024 06:13 AM    GLOBULIN 2.7 08/05/2024 06:13 AM    AGRATIO 1.5 08/05/2024 06:13 AM         GFR:    Lab Results   Component Value Date/Time    GFRCKD 68 08/05/2024 0613           ROS   Review of Systems   Constitutional: Negative.  Negative for fever, chills, weight loss, malaise/fatigue and diaphoresis.   HENT: Negative.  Negative for hearing loss, ear pain, nosebleeds, congestion, sore throat, neck pain, tinnitus and ear discharge.    Respiratory: Negative.  Negative for cough, hemoptysis, sputum production, shortness of breath, wheezing and stridor.    Cardiovascular: Negative.  Negative for chest pain, palpitations, orthopnea, claudication, leg swelling and PND.   Gastrointestinal: denies nausea, vomiting, diarrhea, constipation, heartburn, melena or hematochezia.  Genitourinary: Denies dysuria, hematuria,  "urinary incontinence, frequency or urgency.    Musculoskeletal: Negative.  Negative for myalgias and back pain.   Neurological: Negative.  Negative for dizziness, tingling, tremors, weakness and headaches.   Psych:  Denies depression, anxiety or insomnia.  All other systems reviewed and are negative.         Objective:     /85   Pulse 86   Temp 36.6 °C (97.8 °F) (Temporal)   Resp 19   Ht 1.854 m (6' 1\")   Wt (!) 144 kg (317 lb)   SpO2 96%  Body mass index is 41.82 kg/m².   Physical Exam  Heart exhibits a 1 out of 6 murmur.  Physical Exam   Vitals reviewed.  Constitutional: oriented to person, place, and time. appears well-developed and well-nourished. No distress.   Neck: No JVD present.  Cardiovascular: Normal rate, regular rhythm, normal heart sounds and intact distal pulses.  Exam reveals no gallop and no friction rub.  No murmur heard.  No carotid bruits.   Pulmonary/Chest: Effort normal and breath sounds normal. No stridor. No respiratory distress. no wheezes or rales. exhibits no tenderness.   Musculoskeletal: Normal range of motion. exhibits no edema. opal pedal pulses 2+.  Lymphadenopathy: no cervical or supraclavicular adenopathy.   Neurological: alert and oriented to person, place, and time. exhibits normal muscle tone. Coordination normal.   Skin: Skin is warm and dry. no diaphoresis.   Psychiatric: normal mood and affect. behavior is normal.           Assessment and Plan:   The following treatment plan was discussed      Assessment & Plan  1. Cervical lymphadenopathy.  A tender, enlarged submandibular lymph node on the left side has been identified. A neck ultrasound will be performed to further evaluate this finding. The results will be discussed with him.    2. Elevated PSA.  There is a slight increase in his PSA levels compared to previous readings. He is under the care of a urologist and has a follow-up appointment scheduled for next month.    3. Skin lesion of face.  A changing lesion has " been observed in his left preauricular area. A referral to dermatology has been made for further evaluation.    4. Intraductal papillary mucinous neoplasm (IPMN).  This condition is being monitored by a gastroenterologist, with an MRI recommended every 2 years. The next MRI is due in March 2026.    5. Sleep apnea.  His sleep apnea is stable and well managed with BiPAP and oxygen. He is under the care of a sleep pulmonologist.    6. Migraine syndrome.  His migraines are currently stable with no recent episodes. No treatment is required at this time.    7. Impaired fasting glucose.  His A1c levels have been within the normal range for several years. He is not on any medication for diabetes and follows a healthy diet. Regular exercise is limited due to left knee pain. He has been advised to increase his physical activity as tolerated.    8. Idiopathic gout.  His gout is stable and well managed with allopurinol 300 mg daily, with no recent flare-ups.    9. Hypertension.  His hypertension is stable and well controlled with Maxzide (triamterene/hydrochlorothiazide) 37.5 mg/25 mg daily. His blood pressure reading today is within the normal range.    10. Stage 3a CKD.  His creatinine GFR and albumin creatinine ratio are within normal limits, although his GFR is at the lower end of the normal range. He maintains adequate fluid intake.    11. GERD.  His GERD is stable and well managed with daily omeprazole 20 mg.    12. Incisional hernia.  This condition is stable and benign, requiring no further follow-up.    13. Angiomyolipoma of right kidney.  This condition is stable and benign, requiring no further follow-up.    14. Obesity with BMI of 41.82.  He has been advised to increase his physical activity as tolerated with his left knee pain and to continue his healthy diet.        ORDERS:  1. Cervical lymphadenopathy  US-SOFT TISSUES OF HEAD - NECK      2. Elevated PSA        3. Skin lesion of face  Referral to Dermatology       4. IPMN (intraductal papillary mucinous neoplasm)        5. Obstructive sleep apnea        6. Migraine without status migrainosus, not intractable, unspecified migraine type        7. IFG (impaired fasting glucose)        8. Idiopathic chronic gout of multiple sites with tophus        9. Primary hypertension        10. Stage 3a chronic kidney disease        11. Gastroesophageal reflux disease, unspecified whether esophagitis present        12. Incisional hernia, without obstruction or gangrene        13. Angiomyolipoma of right kidney        14. Morbid obesity with BMI of 40.0-44.9, adult (HCC)            Attestation      Verbal consent was acquired by the patient to use [employer] listening note generation during this visit Yes      Services suggested: No services needed at this time  Health Care Screening: Age-appropriate preventive services recommended by USPTF and ACIP covered by Medicare were discussed today. Services ordered if indicated and agreed upon by the patient.  Referrals offered: Community-based lifestyle interventions to reduce health risks and promote self-management and wellness, fall prevention, nutrition, physical activity, tobacco-use cessation, weight loss, and mental health services as per orders if indicated.    Discussion today about general wellness and lifestyle habits:    Prevent falls and reduce trip hazards; Cautioned about securing or removing rugs.  Have a working fire alarm and carbon monoxide detector;   Engage in regular physical activity and social activities     Follow-up: Return in about 1 year (around 8/29/2025), or call for lab slip.

## 2024-08-29 NOTE — PROGRESS NOTES
Subjective:     History of Present Illness  The patient is a 69-year-old male seen in follow-up for health risk assessment. He is accompanied by an adult female.    He reports that his pulse rate often exceeds 100, which has previously prevented him from donating blood. Despite this, he monitors his blood pressure daily, and it typically remains within the 80s or 90s range. He admits to a lack of regular exercise.    He is currently taking allopurinol, amoxicillin (recently prescribed by his dentist), omeprazole, and triamterene hydrochlorothiazide, all of which he tolerates well.    He has completed his trust and advanced directive but has not yet provided a copy to our office.    He uses a BiPAP machine with oxygen for sleep apnea and is under the care of a pulmonologist. He has not experienced any migraines recently and has not taken Imitrex, a medication he used to take for migraines, in the past 4 to 5 years.    He has a history of liver hemangioma, which has remained stable. He underwent a CT scan in March 2024, which revealed a stable 1 cm side branch IPMN. He was advised to have a repeat CT scan every 2 years.    His incisional hernia is not causing him any discomfort.    He takes glucosamine for joint pain and occasionally uses Tylenol when the pain intensifies.    He has a skin lesion on his face that fluctuates in size. He was previously advised to use hydrocortisone cream for rashes on his eyebrows, nose, and beard, which was effective for about a year.    He has a tender palpable enlarged lymph node on the left side of his neck, which causes him occasional discomfort. He has been using pillows to prop up his head, which has provided some relief.    He maintains good ear hygiene and reports no urinary difficulties.    SOCIAL HISTORY  The patient does not smoke.      Lab reviewed with pt:              Current medicines (including changes today)  Current Outpatient Medications   Medication Sig Dispense  Refill    amoxicillin (AMOXIL) 500 MG Cap TAKE 4 CAPS BY MOUTH before dentist appointment 30 Capsule 0    triamterene-hctz (MAXZIDE-25/DYAZIDE) 37.5-25 MG Tab Take 1 Tablet by mouth every day. 100 Tablet 3    allopurinol (ZYLOPRIM) 300 MG Tab Take 1 Tablet by mouth every day. 100 Tablet 3    omeprazole (PRILOSEC) 20 MG delayed-release capsule Take 1 Capsule by mouth every day. 100 Capsule 3    multivitamin (THERAGRAN) Tab Take 1 Tab by mouth every day.      acetaminophen (TYLENOL) 500 MG Tab Take 1,000 mg by mouth every 6 hours as needed (MIGRAINE).      vitamin D (CHOLECALCIFEROL) 1000 UNIT Tab Take 2,000 Units by mouth every day.       No current facility-administered medications for this visit.     He  has a past medical history of Adrenal nodule (HCC) (01/31/2022), Angiomyolipoma of right kidney (08/16/2023), Arthritis, Atherosclerosis of aorta (HCC) (01/10/2024), Chickenpox, Chronic kidney disease (CKD), stage III (moderate) (01/12/2017), Chronic lower back pain (06/23/2015), Chronic venous insufficiency (09/04/2014), Decreased hearing of right ear (10/01/2020), Dental disorder (12/17/2014), Dermatitis, seborrheic (06/23/2015), Elevated PSA (08/29/2024), GERD (gastroesophageal reflux disease), Headache, classical migraine, HTN (hypertension) (09/04/2014), Idiopathic chronic gout of multiple sites with tophus (07/17/2018), IFG (impaired fasting glucose) (08/16/2023), Incisional hernia, without obstruction or gangrene (07/18/2017), IPMN (intraductal papillary mucinous neoplasm) (08/29/2024), Liver hemangioma (01/31/2022), Morbid obesity with BMI of 40.0-44.9, adult (HCC) (09/04/2020), Seasonal allergies (09/04/2014), Sleep apnea, and Tinnitus of both ears (10/13/2014).    He has no past medical history of Daytime sleepiness, Gasping for breath, Insomnia, Morning headache, or Snoring.    Hemoglobin A1c:***  Lab Results   Component Value Date/Time    HBA1C 5.4 08/05/2024 06:13 AM    HBA1C 5.4 01/02/2024 06:21 AM     "HBA1C 5.8 (H) 02/08/2023 06:17 AM       Microalbumin/creatinine Ratio:***  Lab Results   Component Value Date/Time    URCREAT 148.51 08/05/2024 0614    MICROALBUR <1.2 08/05/2024 0614    MALBCRT see below 08/05/2024 0614       Lipid Panel:***  Lab Results   Component Value Date/Time    CHOLSTRLTOT 176 08/05/2024 0613    TRIGLYCERIDE 307 (H) 08/05/2024 0613    LDL 75 08/05/2024 0613       Thyroid:***  Lab Results   Component Value Date/Time    TSHULTRASEN 1.330 08/24/2020 0643     No results found for: \"FREET4\"    Complete Blood Count:***  Lab Results   Component Value Date/Time    WBC 7.0 08/05/2024 0613    RBC 4.72 08/05/2024 0613    HEMOGLOBIN 15.7 08/05/2024 0613    HEMATOCRIT 45.5 08/05/2024 0613    MCV 96.4 08/05/2024 0613    MCH 33.3 (H) 08/05/2024 0613    MCHC 34.5 08/05/2024 0613    RDW 47.8 08/05/2024 0613    MPV 10.5 08/05/2024 0613    LYMPHOCYTES 25.10 08/05/2024 0613    LYMPHS 1.75 08/05/2024 0613    MONOCYTES 8.90 08/05/2024 0613    MONOS 0.62 08/05/2024 0613    EOSINOPHILS 3.00 08/05/2024 0613    EOS 0.21 08/05/2024 0613    BASOPHILS 0.40 08/05/2024 0613    BASO 0.03 08/05/2024 0613    NRBC 0.00 08/05/2024 0613       Complete Metabolic Panel:***  Lab Results   Component Value Date/Time    SODIUM 141 08/05/2024 06:13 AM    POTASSIUM 3.7 08/05/2024 06:13 AM    CHLORIDE 104 08/05/2024 06:13 AM    CO2 22 08/05/2024 06:13 AM    ANION 15.0 08/05/2024 06:13 AM    GLUCOSE 99 08/05/2024 06:13 AM    BUN 24 (H) 08/05/2024 06:13 AM    CREATININE 1.16 08/05/2024 06:13 AM    ASTSGOT 30 08/05/2024 06:13 AM    ALTSGPT 30 08/05/2024 06:13 AM    TBILIRUBIN 1.5 08/05/2024 06:13 AM    ALBUMIN 4.1 08/05/2024 06:13 AM    TOTPROTEIN 6.8 08/05/2024 06:13 AM    GLOBULIN 2.7 08/05/2024 06:13 AM    AGRATIO 1.5 08/05/2024 06:13 AM         Vitamin D:***    Lab Results   Component Value Date/Time    25HYDROXY 54 08/04/2022 0621       GFR:***    Lab Results   Component Value Date/Time    GFRCKD 68 08/05/2024 0613           ROS " "***  Review of Systems   Constitutional: Negative.  Negative for fever, chills, weight loss, malaise/fatigue and diaphoresis.   HENT: Negative.  Negative for hearing loss, ear pain, nosebleeds, congestion, sore throat, neck pain, tinnitus and ear discharge.    Respiratory: Negative.  Negative for cough, hemoptysis, sputum production, shortness of breath, wheezing and stridor.    Cardiovascular: Negative.  Negative for chest pain, palpitations, orthopnea, claudication, leg swelling and PND.   Gastrointestinal: denies nausea, vomiting, diarrhea, constipation, heartburn, melena or hematochezia.  Genitourinary: Denies dysuria, hematuria, urinary incontinence, frequency or urgency.    Musculoskeletal: Negative.  Negative for myalgias and back pain.   Neurological: Negative.  Negative for dizziness, tingling, tremors, weakness and headaches.   Psych:  Denies depression, anxiety or insomnia.  All other systems reviewed and are negative.         Objective:     /85   Pulse 86   Temp 36.6 °C (97.8 °F) (Temporal)   Resp 19   Ht 1.854 m (6' 1\")   Wt (!) 144 kg (317 lb)   SpO2 96%  Body mass index is 41.82 kg/m². ***  Physical Exam  Heart exhibits a 1 out of 6 murmur.  Physical Exam   Vitals reviewed.  Constitutional: oriented to person, place, and time. appears well-developed and well-nourished. No distress.   Neck: No JVD present.  Cardiovascular: Normal rate, regular rhythm, normal heart sounds and intact distal pulses.  Exam reveals no gallop and no friction rub.  No murmur heard.  No carotid bruits.   Pulmonary/Chest: Effort normal and breath sounds normal. No stridor. No respiratory distress. no wheezes or rales. exhibits no tenderness.   Musculoskeletal: Normal range of motion. exhibits no edema. opal pedal pulses 2+.  Lymphadenopathy: no cervical or supraclavicular adenopathy.   Neurological: alert and oriented to person, place, and time. exhibits normal muscle tone. Coordination normal.   Skin: Skin is warm " and dry. no diaphoresis.   Psychiatric: normal mood and affect. behavior is normal.           Assessment and Plan:   The following treatment plan was discussed      Assessment & Plan  1. Cervical lymphadenopathy.  A tender, enlarged submandibular lymph node on the left side has been identified. A neck ultrasound will be performed to further evaluate this finding. The results will be discussed with him.    2. Elevated PSA.  There is a slight increase in his PSA levels compared to previous readings. He is under the care of a urologist and has a follow-up appointment scheduled for next month.    3. Skin lesion of face.  A changing lesion has been observed in his left preauricular area. A referral to dermatology has been made for further evaluation.    4. Intraductal papillary mucinous neoplasm (IPMN).  This condition is being monitored by a gastroenterologist, with an MRI recommended every 2 years. The next MRI is due in March 2026.    5. Sleep apnea.  His sleep apnea is stable and well managed with BiPAP and oxygen. He is under the care of a sleep pulmonologist.    6. Migraine syndrome.  His migraines are currently stable with no recent episodes. No treatment is required at this time.    7. Impaired fasting glucose.  His A1c levels have been within the normal range for several years. He is not on any medication for diabetes and follows a healthy diet. Regular exercise is limited due to left knee pain. He has been advised to increase his physical activity as tolerated.    8. Idiopathic gout.  His gout is stable and well managed with allopurinol 300 mg daily, with no recent flare-ups.    9. Hypertension.  His hypertension is stable and well controlled with Maxzide (triamterene/hydrochlorothiazide) 37.5 mg/25 mg daily. His blood pressure reading today is within the normal range.    10. Stage 3a CKD.  His creatinine GFR and albumin creatinine ratio are within normal limits, although his GFR is at the lower end of the  normal range. He maintains adequate fluid intake.    11. GERD.  His GERD is stable and well managed with daily omeprazole 20 mg.    12. Incisional hernia.  This condition is stable and benign, requiring no further follow-up.    13. Angiomyolipoma of right kidney.  This condition is stable and benign, requiring no further follow-up.    14. Obesity with BMI of 41.82.  He has been advised to increase his physical activity as tolerated with his left knee pain and to continue his healthy diet.          ORDERS:  1. Cervical lymphadenopathy  ***  - US-SOFT TISSUES OF HEAD - NECK; Future    2. Elevated PSA  ***    3. Skin lesion of face  ***  - Referral to Dermatology    4. IPMN (intraductal papillary mucinous neoplasm)  ***    5. Obstructive sleep apnea  ***    6. Migraine without status migrainosus, not intractable, unspecified migraine type  ***    7. IFG (impaired fasting glucose)  ***    8. Idiopathic chronic gout of multiple sites with tophus  ***    9. Primary hypertension  ***    10. Stage 3a chronic kidney disease  ***    11. Gastroesophageal reflux disease, unspecified whether esophagitis present  ***    12. Incisional hernia, without obstruction or gangrene  ***    13. Angiomyolipoma of right kidney  ***    14. Morbid obesity with BMI of 40.0-44.9, adult (HCC)  ***          Please note that this dictation was created using voice recognition software. I have made every reasonable attempt to correct obvious errors, but I expect that there are errors of grammar and possibly content that I did not discover before finalizing the note.      Attestation      Verbal consent was acquired by the patient to use [employer] ambient listening note generation during this visit {YES/NO:[id number]}        Subjective:     History of Present Illness  The patient is a 69-year-old male seen in follow-up for health risk assessment. He is accompanied by an adult female.    He reports that his pulse rate often exceeds 100, which has  previously prevented him from donating blood. Despite this, he monitors his blood pressure daily, and it typically remains within the 80s or 90s range. He admits to a lack of regular exercise.    He is currently taking allopurinol, amoxicillin (recently prescribed by his dentist), omeprazole, and triamterene hydrochlorothiazide, all of which he tolerates well.    He has completed his trust and advanced directive but has not yet provided a copy to our office.    He uses a BiPAP machine with oxygen for sleep apnea and is under the care of a pulmonologist. He has not experienced any migraines recently and has not taken Imitrex, a medication he used to take for migraines, in the past 4 to 5 years.    He has a history of liver hemangioma, which has remained stable. He underwent a CT scan in March 2024, which revealed a stable 1 cm side branch IPMN. He was advised to have a repeat CT scan every 2 years.    His incisional hernia is not causing him any discomfort.    He takes glucosamine for joint pain and occasionally uses Tylenol when the pain intensifies.    He has a skin lesion on his face that fluctuates in size. He was previously advised to use hydrocortisone cream for rashes on his eyebrows, nose, and beard, which was effective for about a year.    He has a tender palpable enlarged lymph node on the left side of his neck, which causes him occasional discomfort. He has been using pillows to prop up his head, which has provided some relief.    He maintains good ear hygiene and reports no urinary difficulties.    SOCIAL HISTORY  The patient does not smoke.      Lab reviewed with pt:              Current medicines (including changes today)  Current Outpatient Medications   Medication Sig Dispense Refill    amoxicillin (AMOXIL) 500 MG Cap TAKE 4 CAPS BY MOUTH before dentist appointment 30 Capsule 0    triamterene-hctz (MAXZIDE-25/DYAZIDE) 37.5-25 MG Tab Take 1 Tablet by mouth every day. 100 Tablet 3    allopurinol  (ZYLOPRIM) 300 MG Tab Take 1 Tablet by mouth every day. 100 Tablet 3    omeprazole (PRILOSEC) 20 MG delayed-release capsule Take 1 Capsule by mouth every day. 100 Capsule 3    multivitamin (THERAGRAN) Tab Take 1 Tab by mouth every day.      acetaminophen (TYLENOL) 500 MG Tab Take 1,000 mg by mouth every 6 hours as needed (MIGRAINE).      vitamin D (CHOLECALCIFEROL) 1000 UNIT Tab Take 2,000 Units by mouth every day.       No current facility-administered medications for this visit.     He  has a past medical history of Adrenal nodule (HCC) (01/31/2022), Angiomyolipoma of right kidney (08/16/2023), Arthritis, Atherosclerosis of aorta (HCC) (01/10/2024), Chickenpox, Chronic kidney disease (CKD), stage III (moderate) (01/12/2017), Chronic lower back pain (06/23/2015), Chronic venous insufficiency (09/04/2014), Decreased hearing of right ear (10/01/2020), Dental disorder (12/17/2014), Dermatitis, seborrheic (06/23/2015), Elevated PSA (08/29/2024), GERD (gastroesophageal reflux disease), Headache, classical migraine, HTN (hypertension) (09/04/2014), Idiopathic chronic gout of multiple sites with tophus (07/17/2018), IFG (impaired fasting glucose) (08/16/2023), Incisional hernia, without obstruction or gangrene (07/18/2017), IPMN (intraductal papillary mucinous neoplasm) (08/29/2024), Liver hemangioma (01/31/2022), Morbid obesity with BMI of 40.0-44.9, adult (HCC) (09/04/2020), Seasonal allergies (09/04/2014), Sleep apnea, and Tinnitus of both ears (10/13/2014).    He has no past medical history of Daytime sleepiness, Gasping for breath, Insomnia, Morning headache, or Snoring.    Hemoglobin A1c:***  Lab Results   Component Value Date/Time    HBA1C 5.4 08/05/2024 06:13 AM    HBA1C 5.4 01/02/2024 06:21 AM    HBA1C 5.8 (H) 02/08/2023 06:17 AM       Microalbumin/creatinine Ratio:***  Lab Results   Component Value Date/Time    URCREAT 148.51 08/05/2024 0614    MICROALBUR <1.2 08/05/2024 0614    MALBCRT see below 08/05/2024 0614  "      Lipid Panel:***  Lab Results   Component Value Date/Time    CHOLSTRLTOT 176 08/05/2024 0613    TRIGLYCERIDE 307 (H) 08/05/2024 0613    LDL 75 08/05/2024 0613       Thyroid:***  Lab Results   Component Value Date/Time    TSHULTRASEN 1.330 08/24/2020 0643     No results found for: \"FREET4\"    Complete Blood Count:***  Lab Results   Component Value Date/Time    WBC 7.0 08/05/2024 0613    RBC 4.72 08/05/2024 0613    HEMOGLOBIN 15.7 08/05/2024 0613    HEMATOCRIT 45.5 08/05/2024 0613    MCV 96.4 08/05/2024 0613    MCH 33.3 (H) 08/05/2024 0613    MCHC 34.5 08/05/2024 0613    RDW 47.8 08/05/2024 0613    MPV 10.5 08/05/2024 0613    LYMPHOCYTES 25.10 08/05/2024 0613    LYMPHS 1.75 08/05/2024 0613    MONOCYTES 8.90 08/05/2024 0613    MONOS 0.62 08/05/2024 0613    EOSINOPHILS 3.00 08/05/2024 0613    EOS 0.21 08/05/2024 0613    BASOPHILS 0.40 08/05/2024 0613    BASO 0.03 08/05/2024 0613    NRBC 0.00 08/05/2024 0613       Complete Metabolic Panel:***  Lab Results   Component Value Date/Time    SODIUM 141 08/05/2024 06:13 AM    POTASSIUM 3.7 08/05/2024 06:13 AM    CHLORIDE 104 08/05/2024 06:13 AM    CO2 22 08/05/2024 06:13 AM    ANION 15.0 08/05/2024 06:13 AM    GLUCOSE 99 08/05/2024 06:13 AM    BUN 24 (H) 08/05/2024 06:13 AM    CREATININE 1.16 08/05/2024 06:13 AM    ASTSGOT 30 08/05/2024 06:13 AM    ALTSGPT 30 08/05/2024 06:13 AM    TBILIRUBIN 1.5 08/05/2024 06:13 AM    ALBUMIN 4.1 08/05/2024 06:13 AM    TOTPROTEIN 6.8 08/05/2024 06:13 AM    GLOBULIN 2.7 08/05/2024 06:13 AM    AGRATIO 1.5 08/05/2024 06:13 AM         Vitamin D:***    Lab Results   Component Value Date/Time    25HYDROXY 54 08/04/2022 0621       GFR:***    Lab Results   Component Value Date/Time    GFRCKD 68 08/05/2024 0613           ROS ***  Review of Systems   Constitutional: Negative.  Negative for fever, chills, weight loss, malaise/fatigue and diaphoresis.   HENT: Negative.  Negative for hearing loss, ear pain, nosebleeds, congestion, sore throat, neck " "pain, tinnitus and ear discharge.    Respiratory: Negative.  Negative for cough, hemoptysis, sputum production, shortness of breath, wheezing and stridor.    Cardiovascular: Negative.  Negative for chest pain, palpitations, orthopnea, claudication, leg swelling and PND.   Gastrointestinal: denies nausea, vomiting, diarrhea, constipation, heartburn, melena or hematochezia.  Genitourinary: Denies dysuria, hematuria, urinary incontinence, frequency or urgency.    Musculoskeletal: Negative.  Negative for myalgias and back pain.   Neurological: Negative.  Negative for dizziness, tingling, tremors, weakness and headaches.   Psych:  Denies depression, anxiety or insomnia.  All other systems reviewed and are negative.         Objective:     /85   Pulse 86   Temp 36.6 °C (97.8 °F) (Temporal)   Resp 19   Ht 1.854 m (6' 1\")   Wt (!) 144 kg (317 lb)   SpO2 96%  Body mass index is 41.82 kg/m². ***  Physical Exam  Heart exhibits a 1 out of 6 murmur.  Physical Exam   Vitals reviewed.  Constitutional: oriented to person, place, and time. appears well-developed and well-nourished. No distress.   Neck: No JVD present.  Cardiovascular: Normal rate, regular rhythm, normal heart sounds and intact distal pulses.  Exam reveals no gallop and no friction rub.  No murmur heard.  No carotid bruits.   Pulmonary/Chest: Effort normal and breath sounds normal. No stridor. No respiratory distress. no wheezes or rales. exhibits no tenderness.   Musculoskeletal: Normal range of motion. exhibits no edema. opal pedal pulses 2+.  Lymphadenopathy: no cervical or supraclavicular adenopathy.   Neurological: alert and oriented to person, place, and time. exhibits normal muscle tone. Coordination normal.   Skin: Skin is warm and dry. no diaphoresis.   Psychiatric: normal mood and affect. behavior is normal.           Assessment and Plan:   The following treatment plan was discussed      Assessment & Plan  1. Cervical lymphadenopathy.  A tender, " enlarged submandibular lymph node on the left side has been identified. A neck ultrasound will be performed to further evaluate this finding. The results will be discussed with him.    2. Elevated PSA.  There is a slight increase in his PSA levels compared to previous readings. He is under the care of a urologist and has a follow-up appointment scheduled for next month.    3. Skin lesion of face.  A changing lesion has been observed in his left preauricular area. A referral to dermatology has been made for further evaluation.    4. Intraductal papillary mucinous neoplasm (IPMN).  This condition is being monitored by a gastroenterologist, with an MRI recommended every 2 years. The next MRI is due in March 2026.    5. Sleep apnea.  His sleep apnea is stable and well managed with BiPAP and oxygen. He is under the care of a sleep pulmonologist.    6. Migraine syndrome.  His migraines are currently stable with no recent episodes. No treatment is required at this time.    7. Impaired fasting glucose.  His A1c levels have been within the normal range for several years. He is not on any medication for diabetes and follows a healthy diet. Regular exercise is limited due to left knee pain. He has been advised to increase his physical activity as tolerated.    8. Idiopathic gout.  His gout is stable and well managed with allopurinol 300 mg daily, with no recent flare-ups.    9. Hypertension.  His hypertension is stable and well controlled with Maxzide (triamterene/hydrochlorothiazide) 37.5 mg/25 mg daily. His blood pressure reading today is within the normal range.    10. Stage 3a CKD.  His creatinine GFR and albumin creatinine ratio are within normal limits, although his GFR is at the lower end of the normal range. He maintains adequate fluid intake.    11. GERD.  His GERD is stable and well managed with daily omeprazole 20 mg.    12. Incisional hernia.  This condition is stable and benign, requiring no further  follow-up.    13. Angiomyolipoma of right kidney.  This condition is stable and benign, requiring no further follow-up.    14. Obesity with BMI of 41.82.  He has been advised to increase his physical activity as tolerated with his left knee pain and to continue his healthy diet.        ORDERS:  1. Cervical lymphadenopathy  ***  - US-SOFT TISSUES OF HEAD - NECK; Future    2. Elevated PSA  ***    3. Skin lesion of face  ***  - Referral to Dermatology    4. IPMN (intraductal papillary mucinous neoplasm)  ***    5. Obstructive sleep apnea  ***    6. Migraine without status migrainosus, not intractable, unspecified migraine type  ***    7. IFG (impaired fasting glucose)  ***    8. Idiopathic chronic gout of multiple sites with tophus  ***    9. Primary hypertension  ***    10. Stage 3a chronic kidney disease  ***    11. Gastroesophageal reflux disease, unspecified whether esophagitis present  ***    12. Incisional hernia, without obstruction or gangrene  ***    13. Angiomyolipoma of right kidney  ***    14. Morbid obesity with BMI of 40.0-44.9, adult (HCC)  ***          Please note that this dictation was created using voice recognition software. I have made every reasonable attempt to correct obvious errors, but I expect that there are errors of grammar and possibly content that I did not discover before finalizing the note.      Attestation      Verbal consent was acquired by the patient to use [employer] ambient listening note generation during this visit {YES/NO:[id number]}

## 2024-09-04 ENCOUNTER — PATIENT MESSAGE (OUTPATIENT)
Dept: SLEEP MEDICINE | Facility: MEDICAL CENTER | Age: 69
End: 2024-09-04
Payer: MEDICARE

## 2024-09-20 ENCOUNTER — APPOINTMENT (RX ONLY)
Dept: URBAN - METROPOLITAN AREA CLINIC 20 | Facility: CLINIC | Age: 69
Setting detail: DERMATOLOGY
End: 2024-09-20

## 2024-09-20 DIAGNOSIS — L82.1 OTHER SEBORRHEIC KERATOSIS: ICD-10-CM

## 2024-09-20 DIAGNOSIS — L21.8 OTHER SEBORRHEIC DERMATITIS: ICD-10-CM | Status: INADEQUATELY CONTROLLED

## 2024-09-20 PROCEDURE — 99204 OFFICE O/P NEW MOD 45 MIN: CPT

## 2024-09-20 PROCEDURE — ? PRESCRIPTION

## 2024-09-20 PROCEDURE — ? COUNSELING

## 2024-09-20 PROCEDURE — ? ADDITIONAL NOTES

## 2024-09-20 RX ORDER — KETOCONAZOLE 20 MG/ML
SHAMPOO, SUSPENSION TOPICAL
Qty: 120 | Refills: 11 | Status: ERX | COMMUNITY
Start: 2024-09-20

## 2024-09-20 RX ORDER — EMOLLIENT COMBINATION NO.43
CREAM (GRAM) TOPICAL
Qty: 30 | Refills: 2 | Status: ERX | COMMUNITY
Start: 2024-09-20

## 2024-09-20 RX ADMIN — Medication: at 00:00

## 2024-09-20 RX ADMIN — KETOCONAZOLE: 20 SHAMPOO, SUSPENSION TOPICAL at 00:00

## 2024-09-20 ASSESSMENT — LOCATION ZONE DERM
LOCATION ZONE: SCALP
LOCATION ZONE: FACE

## 2024-09-20 ASSESSMENT — LOCATION SIMPLE DESCRIPTION DERM
LOCATION SIMPLE: RIGHT FOREHEAD
LOCATION SIMPLE: LEFT FOREHEAD
LOCATION SIMPLE: LEFT SCALP
LOCATION SIMPLE: LEFT CHEEK

## 2024-09-20 ASSESSMENT — LOCATION DETAILED DESCRIPTION DERM
LOCATION DETAILED: RIGHT INFERIOR FOREHEAD
LOCATION DETAILED: LEFT SUPERIOR LATERAL MALAR CHEEK
LOCATION DETAILED: LEFT INFERIOR FOREHEAD
LOCATION DETAILED: LEFT MEDIAL FRONTAL SCALP

## 2024-09-20 NOTE — HPI: RASH
What Type Of Note Output Would You Prefer (Optional)?: Bullet Format
How Severe Is Your Rash?: mild
Is This A New Presentation, Or A Follow-Up?: Rash
Additional History: Present since 2013, itchy flaking in eyebrows, scalp.

## 2024-09-20 NOTE — PROCEDURE: ADDITIONAL NOTES
Render Risk Assessment In Note?: no
Detail Level: Simple
Additional Notes: Patient has been dealing with [facility] for years. Was using Triamcinolone and tacrolimus in 2023. \\n\\nCurrently using celsum blue medicated\\n\\nWill start Ketoconazole shampoo and promiseb

## 2024-09-30 ENCOUNTER — HOSPITAL ENCOUNTER (OUTPATIENT)
Dept: RADIOLOGY | Facility: MEDICAL CENTER | Age: 69
End: 2024-09-30
Attending: NURSE PRACTITIONER
Payer: MEDICARE

## 2024-09-30 DIAGNOSIS — R59.0 CERVICAL LYMPHADENOPATHY: ICD-10-CM

## 2024-09-30 PROCEDURE — 76536 US EXAM OF HEAD AND NECK: CPT

## 2024-10-10 DIAGNOSIS — E04.1 THYROID NODULE: ICD-10-CM

## 2024-10-10 DIAGNOSIS — R59.0 CERVICAL LYMPHADENOPATHY: ICD-10-CM

## 2024-10-21 ENCOUNTER — TELEPHONE (OUTPATIENT)
Dept: MEDICAL GROUP | Facility: MEDICAL CENTER | Age: 69
End: 2024-10-21
Payer: MEDICARE

## 2024-10-21 DIAGNOSIS — R73.01 IFG (IMPAIRED FASTING GLUCOSE): ICD-10-CM

## 2024-10-23 ENCOUNTER — HOSPITAL ENCOUNTER (OUTPATIENT)
Dept: LAB | Facility: MEDICAL CENTER | Age: 69
End: 2024-10-23
Attending: INTERNAL MEDICINE
Payer: MEDICARE

## 2024-10-23 DIAGNOSIS — R73.01 IFG (IMPAIRED FASTING GLUCOSE): ICD-10-CM

## 2024-10-23 LAB
ANION GAP SERPL CALC-SCNC: 12 MMOL/L (ref 7–16)
BUN SERPL-MCNC: 29 MG/DL (ref 8–22)
CALCIUM SERPL-MCNC: 9.4 MG/DL (ref 8.5–10.5)
CHLORIDE SERPL-SCNC: 105 MMOL/L (ref 96–112)
CO2 SERPL-SCNC: 25 MMOL/L (ref 20–33)
CREAT SERPL-MCNC: 1.31 MG/DL (ref 0.5–1.4)
GFR SERPLBLD CREATININE-BSD FMLA CKD-EPI: 58 ML/MIN/1.73 M 2
GLUCOSE SERPL-MCNC: 110 MG/DL (ref 65–99)
POTASSIUM SERPL-SCNC: 4.4 MMOL/L (ref 3.6–5.5)
SODIUM SERPL-SCNC: 142 MMOL/L (ref 135–145)

## 2024-10-23 PROCEDURE — 80048 BASIC METABOLIC PNL TOTAL CA: CPT

## 2024-10-23 PROCEDURE — 36415 COLL VENOUS BLD VENIPUNCTURE: CPT

## 2024-10-24 ENCOUNTER — HOSPITAL ENCOUNTER (OUTPATIENT)
Dept: RADIOLOGY | Facility: MEDICAL CENTER | Age: 69
End: 2024-10-24
Attending: NURSE PRACTITIONER
Payer: MEDICARE

## 2024-10-24 DIAGNOSIS — E04.1 THYROID NODULE: ICD-10-CM

## 2024-10-24 DIAGNOSIS — R59.0 CERVICAL LYMPHADENOPATHY: ICD-10-CM

## 2024-10-24 PROCEDURE — 700117 HCHG RX CONTRAST REV CODE 255: Performed by: NURSE PRACTITIONER

## 2024-10-24 PROCEDURE — 70491 CT SOFT TISSUE NECK W/DYE: CPT

## 2024-10-24 RX ADMIN — IOHEXOL 80 ML: 350 INJECTION, SOLUTION INTRAVENOUS at 08:05

## 2024-11-12 ENCOUNTER — OFFICE VISIT (OUTPATIENT)
Dept: MEDICAL GROUP | Facility: MEDICAL CENTER | Age: 69
End: 2024-11-12
Payer: MEDICARE

## 2024-11-12 VITALS
TEMPERATURE: 98 F | SYSTOLIC BLOOD PRESSURE: 130 MMHG | OXYGEN SATURATION: 98 % | DIASTOLIC BLOOD PRESSURE: 78 MMHG | WEIGHT: 315 LBS | HEART RATE: 89 BPM | BODY MASS INDEX: 41.75 KG/M2 | HEIGHT: 73 IN

## 2024-11-12 DIAGNOSIS — R91.1 PULMONARY NODULE: ICD-10-CM

## 2024-11-12 DIAGNOSIS — R59.0 CERVICAL LYMPHADENOPATHY: ICD-10-CM

## 2024-11-12 DIAGNOSIS — M54.2 NECK PAIN ON LEFT SIDE: ICD-10-CM

## 2024-11-12 DIAGNOSIS — H91.90 HOH (HARD OF HEARING): ICD-10-CM

## 2024-11-12 DIAGNOSIS — E04.1 RIGHT THYROID NODULE: ICD-10-CM

## 2024-11-12 PROCEDURE — 3075F SYST BP GE 130 - 139MM HG: CPT | Performed by: NURSE PRACTITIONER

## 2024-11-12 PROCEDURE — 99214 OFFICE O/P EST MOD 30 MIN: CPT | Performed by: NURSE PRACTITIONER

## 2024-11-12 PROCEDURE — 3078F DIAST BP <80 MM HG: CPT | Performed by: NURSE PRACTITIONER

## 2024-11-12 ASSESSMENT — FIBROSIS 4 INDEX: FIB4 SCORE: 2.52

## 2024-11-17 NOTE — PROGRESS NOTES
Subjective:     History of Present Illness  The patient is a 69-year-old male seen in follow-up for thyroid nodule.    He experiences hearing loss, more pronounced in his right ear than the left. He uses hearing aids only when on the phone or listening to music. His work involves the use of pneumatic tools, which may contribute to his hearing issues. He has been in the [employer] trade since he was 14 years old.    He takes Aleve and Tylenol for arthritis pain, which is exacerbated by cold weather.      Results  Imaging:  10/24/24  CT neck done for cervical lymphadenopathy  1.  Suboptimal exam related to extensive artifact from dental amalgams.  2.  4.0 cm right thyroid nodule. Additional smaller left thyroid nodules. These can be further evaluated with dedicated thyroid ultrasound.  3.  Mildly enlarged left submandibular lymph node, nonspecific.  4.  6 mm right upper lobe pleural-based pulmonary nodule.     Fleischner society recommendations for follow up:  Low Risk: CT at 6-12 months, then consider CT at 18-24 months     High Risk: CT at 6-12 months, then CT at 18-24 months    Current medicines (including changes today)  Current Outpatient Medications   Medication Sig Dispense Refill    amoxicillin (AMOXIL) 500 MG Cap TAKE 4 CAPS BY MOUTH before dentist appointment 30 Capsule 0    triamterene-hctz (MAXZIDE-25/DYAZIDE) 37.5-25 MG Tab Take 1 Tablet by mouth every day. 100 Tablet 3    allopurinol (ZYLOPRIM) 300 MG Tab Take 1 Tablet by mouth every day. 100 Tablet 3    omeprazole (PRILOSEC) 20 MG delayed-release capsule Take 1 Capsule by mouth every day. 100 Capsule 3    multivitamin (THERAGRAN) Tab Take 1 Tab by mouth every day.      acetaminophen (TYLENOL) 500 MG Tab Take 1,000 mg by mouth every 6 hours as needed (MIGRAINE).      vitamin D (CHOLECALCIFEROL) 1000 UNIT Tab Take 2,000 Units by mouth every day.       No current facility-administered medications for this visit.     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    He has no past medical history of Daytime sleepiness, Gasping for breath, Insomnia, Morning headache, or Snoring.      ROS   Review of Systems   Constitutional: Negative.  Negative for fever, chills, weight loss, malaise/fatigue and diaphoresis.   HENT: Negative.  Negative for hearing loss, ear pain, nosebleeds, congestion, sore throat, neck pain, tinnitus and ear discharge.    Respiratory: Negative.  Negative for cough, hemoptysis, sputum production, shortness of breath, wheezing and stridor.    Cardiovascular: Negative.  Negative for chest pain, palpitations, orthopnea, claudication, leg swelling and PND.   Gastrointestinal: denies nausea, vomiting, diarrhea, constipation, heartburn, melena or hematochezia.  Genitourinary: Denies dysuria, hematuria, urinary incontinence, frequency or urgency.    Musculoskeletal: Negative.  Negative for myalgias and back pain.   Neurological: Negative.  Negative for dizziness, tingling, tremors, weakness and headaches.   Psych:  Denies depression,  "anxiety or insomnia.  All other systems reviewed and are negative.     Objective:     /78   Pulse 89   Temp 36.7 °C (98 °F) (Temporal)   Ht 1.854 m (6' 1\")   Wt (!) 144 kg (317 lb)   SpO2 98%  Body mass index is 41.82 kg/m².   Physical Exam    Physical Exam   Vitals reviewed.  Constitutional: oriented to person, place, and time. appears well-developed and well-nourished. No distress.   Neck: No JVD present.  Cardiovascular: Normal rate, regular rhythm, normal heart sounds and intact distal pulses.  Exam reveals no gallop and no friction rub.  No murmur heard.  No carotid bruits.   Pulmonary/Chest: Effort normal and breath sounds normal. No stridor. No respiratory distress. no wheezes or rales. exhibits no tenderness.   Musculoskeletal: Normal range of motion. exhibits no edema. opal pedal pulses 2+.  Lymphadenopathy: no cervical or supraclavicular adenopathy.   Neurological: alert and oriented to person, place, and time. exhibits normal muscle tone. Coordination normal.   Skin: Skin is warm and dry. no diaphoresis.   Psychiatric: normal mood and affect. behavior is normal.     Assessment and Plan:   The following treatment plan was discussed  Assessment & Plan  1. Right thyroid nodule.  A 4 cm right thyroid nodule was detected on his scan. A biopsy of the thyroid nodule is recommended. The biopsy will be ordered, and if a dedicated thyroid ultrasound is required before the biopsy, it will be arranged.    2. Cervical lymphadenopathy.  A mildly elevated, nonspecific lymph node was noted. This will be monitored, and further action will depend on the results of the thyroid biopsy.    3. Pulmonary nodule.  A 6 mm lung nodule was identified on his neck CT scan. A CT scan of the chest without contrast will be ordered to further evaluate the nodule.    4. Left neck pain.  He reports left neck pain which has been persistent. A referral to an ENT specialist will be made to further investigate this symptom.    5.  " [place]  Stable with hearing aides      ORDERS:  1. Right thyroid nodule    - US-NEEDLE BX-THYROID; Future    2. Cervical lymphadenopathy    - US-NEEDLE BX-THYROID; Future  - Referral to ENT    3. Pulmonary nodule    - CT-CHEST (THORAX) W/O; Future    4. Neck pain on left side    - Referral to ENT          Please note that this dictation was created using voice recognition software. I have made every reasonable attempt to correct obvious errors, but I expect that there are errors of grammar and possibly content that I did not discover before finalizing the note.      Attestation      Verbal consent was acquired by the patient to use [employer] ambient listening note generation during this visit Yes

## 2024-11-27 ENCOUNTER — APPOINTMENT (RX ONLY)
Dept: URBAN - METROPOLITAN AREA CLINIC 20 | Facility: CLINIC | Age: 69
Setting detail: DERMATOLOGY
End: 2024-11-27

## 2024-11-27 ENCOUNTER — PATIENT MESSAGE (OUTPATIENT)
Dept: MEDICAL GROUP | Facility: MEDICAL CENTER | Age: 69
End: 2024-11-27
Payer: MEDICARE

## 2024-11-27 DIAGNOSIS — L21.8 OTHER SEBORRHEIC DERMATITIS: ICD-10-CM

## 2024-11-27 PROCEDURE — ? COUNSELING

## 2024-11-27 PROCEDURE — ? ADDITIONAL NOTES

## 2024-11-27 PROCEDURE — ? PRESCRIPTION

## 2024-11-27 PROCEDURE — 99214 OFFICE O/P EST MOD 30 MIN: CPT

## 2024-11-27 RX ORDER — KETOCONAZOLE 2 G/100G
AEROSOL, FOAM TOPICAL
Qty: 50 | Refills: 4 | Status: ERX | COMMUNITY
Start: 2024-11-27

## 2024-11-27 RX ORDER — FLUCONAZOLE 150 MG/1
TABLET ORAL
Qty: 2 | Refills: 0 | Status: ERX | COMMUNITY
Start: 2024-11-27

## 2024-11-27 RX ADMIN — KETOCONAZOLE: 2 AEROSOL, FOAM TOPICAL at 00:00

## 2024-11-27 RX ADMIN — FLUCONAZOLE: 150 TABLET ORAL at 00:00

## 2024-11-27 ASSESSMENT — LOCATION ZONE DERM
LOCATION ZONE: FACE
LOCATION ZONE: SCALP

## 2024-11-27 ASSESSMENT — LOCATION DETAILED DESCRIPTION DERM
LOCATION DETAILED: LEFT MEDIAL FRONTAL SCALP
LOCATION DETAILED: LEFT INFERIOR FOREHEAD
LOCATION DETAILED: RIGHT INFERIOR FOREHEAD

## 2024-11-27 ASSESSMENT — LOCATION SIMPLE DESCRIPTION DERM
LOCATION SIMPLE: RIGHT FOREHEAD
LOCATION SIMPLE: LEFT FOREHEAD
LOCATION SIMPLE: LEFT SCALP

## 2024-11-27 NOTE — PROCEDURE: ADDITIONAL NOTES
Render Risk Assessment In Note?: no
Detail Level: Simple
Additional Notes: Viraltent states itching has gotten better but still feels scaly. Alternates with celsum janet and head and shoulders. \\n\\nStates ketoconazole shampoo has not really helped. Will try to send ketoconazole foam. \\n\\nWill d/c promiseb\\n\\nAlso discussed diflucan

## 2025-01-03 ENCOUNTER — HOSPITAL ENCOUNTER (OUTPATIENT)
Dept: RADIOLOGY | Facility: MEDICAL CENTER | Age: 70
End: 2025-01-03
Attending: NURSE PRACTITIONER
Payer: MEDICARE

## 2025-01-03 DIAGNOSIS — R59.0 CERVICAL LYMPHADENOPATHY: ICD-10-CM

## 2025-01-03 DIAGNOSIS — E04.1 RIGHT THYROID NODULE: ICD-10-CM

## 2025-01-03 LAB — CYTOLOGY REG CYTOL: NORMAL

## 2025-01-03 PROCEDURE — 10005 FNA BX W/US GDN 1ST LES: CPT

## 2025-01-03 PROCEDURE — 88173 CYTOPATH EVAL FNA REPORT: CPT

## 2025-01-03 NOTE — PROGRESS NOTES
US guided right thyroid nodule fine needle aspiration done by Dr. Cueto; NON-SEDATION (no H&P required as this is a NON SEDATION procedure) right anterior aspect of neck access site, dressing CDI; 4 samples in 1 jar of cytolyt obtained, 2 samples 1 vial afirma obtained and sent to lab. Pt tolerated the procedure well. Pt hemodynamically stable pre/intra/post procedure; all questions and concerns answered prior to being d/c; patient provided with appropriate education for procedure; pt d/c home.

## 2025-01-07 ENCOUNTER — HOSPITAL ENCOUNTER (OUTPATIENT)
Dept: RADIOLOGY | Facility: MEDICAL CENTER | Age: 70
End: 2025-01-07
Attending: NURSE PRACTITIONER
Payer: MEDICARE

## 2025-01-07 DIAGNOSIS — R91.1 PULMONARY NODULE: ICD-10-CM

## 2025-01-07 PROCEDURE — 71250 CT THORAX DX C-: CPT

## 2025-01-15 ENCOUNTER — APPOINTMENT (OUTPATIENT)
Dept: URBAN - METROPOLITAN AREA CLINIC 20 | Facility: CLINIC | Age: 70
Setting detail: DERMATOLOGY
End: 2025-01-15

## 2025-01-15 DIAGNOSIS — L21.8 OTHER SEBORRHEIC DERMATITIS: ICD-10-CM

## 2025-01-15 PROCEDURE — ? COUNSELING

## 2025-01-15 PROCEDURE — ? ADDITIONAL NOTES

## 2025-01-15 PROCEDURE — 99214 OFFICE O/P EST MOD 30 MIN: CPT

## 2025-01-15 PROCEDURE — ? MEDICATION COUNSELING

## 2025-01-15 PROCEDURE — ? PRESCRIPTION

## 2025-01-15 RX ORDER — KETOCONAZOLE 20 MG/G
CREAM TOPICAL QD
Qty: 60 | Refills: 7 | Status: ERX | COMMUNITY
Start: 2025-01-15

## 2025-01-15 RX ORDER — KETOCONAZOLE 2 G/100G
AEROSOL, FOAM TOPICAL
Qty: 50 | Refills: 12 | Status: ERX | COMMUNITY
Start: 2025-01-15

## 2025-01-15 RX ADMIN — KETOCONAZOLE: 20 CREAM TOPICAL at 00:00

## 2025-01-15 RX ADMIN — KETOCONAZOLE: 2 AEROSOL, FOAM TOPICAL at 00:00

## 2025-01-15 ASSESSMENT — LOCATION ZONE DERM
LOCATION ZONE: SCALP
LOCATION ZONE: FACE

## 2025-01-15 ASSESSMENT — LOCATION SIMPLE DESCRIPTION DERM
LOCATION SIMPLE: LEFT SCALP
LOCATION SIMPLE: LEFT FOREHEAD
LOCATION SIMPLE: RIGHT FOREHEAD

## 2025-01-15 ASSESSMENT — LOCATION DETAILED DESCRIPTION DERM
LOCATION DETAILED: RIGHT INFERIOR FOREHEAD
LOCATION DETAILED: LEFT INFERIOR FOREHEAD
LOCATION DETAILED: LEFT MEDIAL FRONTAL SCALP

## 2025-01-15 NOTE — PROCEDURE: MEDICATION COUNSELING
Opioid Pregnancy And Lactation Text: These medications can lead to premature delivery and should be avoided during pregnancy. These medications are also present in breast milk in small amounts.
Semaglutide Pregnancy And Lactation Text: The fetal risk of this medication is unknown and taking while pregnant is not recommended. It is unknown if this medication is present in breast milk.
Infliximab Counseling:  I discussed with the patient the risks of infliximab including but not limited to myelosuppression, immunosuppression, autoimmune hepatitis, demyelinating diseases, lymphoma, and serious infections.  The patient understands that monitoring is required including a PPD at baseline and must alert us or the primary physician if symptoms of infection or other concerning signs are noted.
Low Dose Naltrexone Counseling- I discussed with the patient the potential risks and side effects of low dose naltrexone including but not limited to: more vivid dreams, headaches, nausea, vomiting, abdominal pain, fatigue, dizziness, and anxiety.
Soolantra Counseling: I discussed with the patients the risks of topial Soolantra. This is a medicine which decreases the number of mites and inflammation in the skin. You experience burning, stinging, eye irritation or allergic reactions.  Please call our office if you develop any problems from using this medication.
5-Fu Counseling: 5-Fluorouracil Counseling:  I discussed with the patient the risks of 5-fluorouracil including but not limited to erythema, scaling, itching, weeping, crusting, and pain.
Prednisone Pregnancy And Lactation Text: This medication is Pregnancy Category C and it isn't know if it is safe during pregnancy. This medication is excreted in breast milk.
Taltz Counseling: I discussed with the patient the risks of ixekizumab including but not limited to immunosuppression, serious infections, worsening of inflammatory bowel disease and drug reactions.  The patient understands that monitoring is required including a PPD at baseline and must alert us or the primary physician if symptoms of infection or other concerning signs are noted.
Winlevi Counseling:  I discussed with the patient the risks of topical clascoterone including but not limited to erythema, scaling, itching, and stinging. Patient voiced their understanding.
Isotretinoin Counseling: Patient should get monthly blood tests, not donate blood, not drive at night if vision affected, not share medication, and not undergo elective surgery for 6 months after tx completed. Side effects reviewed, pt to contact office should one occur.
Griseofulvin Pregnancy And Lactation Text: This medication is Pregnancy Category X and is known to cause serious birth defects. It is unknown if this medication is excreted in breast milk but breast feeding should be avoided.
Bimzelx Counseling:  I discussed with the patient the risks of Bimzelx including but not limited to depression, immunosuppression, allergic reactions and infections.  The patient understands that monitoring is required including a PPD at baseline and must alert us or the primary physician if symptoms of infection or other concerning signs are noted.
Isotretinoin Pregnancy And Lactation Text: This medication is Pregnancy Category X and is considered extremely dangerous during pregnancy. It is unknown if it is excreted in breast milk.
Azithromycin Counseling:  I discussed with the patient the risks of azithromycin including but not limited to GI upset, allergic reaction, drug rash, diarrhea, and yeast infections.
Rifampin Counseling: I discussed with the patient the risks of rifampin including but not limited to liver damage, kidney damage, red-orange body fluids, nausea/vomiting and severe allergy.
Libtayo Pregnancy And Lactation Text: This medication is contraindicated in pregnancy and when breast feeding.
Thalidomide Counseling: I discussed with the patient the risks of thalidomide including but not limited to birth defects, anxiety, weakness, chest pain, dizziness, cough and severe allergy.
Sotyktu Counseling:  I discussed the most common side effects of Sotyktu including: common cold, sore throat, sinus infections, cold sores, canker sores, folliculitis, and acne.? I also discussed more serious side effects of Sotyktu including but not limited to: serious allergic reactions; increased risk for infections such as TB; cancers such as lymphomas; rhabdomyolysis and elevated CPK; and elevated triglycerides and liver enzymes.?
Klisyri Counseling:  I discussed with the patient the risks of Klisyri including but not limited to erythema, scaling, itching, weeping, crusting, and pain.
Sotyktu Pregnancy And Lactation Text: There is insufficient data to evaluate whether or not Sotyktu is safe to use during pregnancy.? ?It is not known if Sotyktu passes into breast milk and whether or not it is safe to use when breastfeeding.??
Odomzo Counseling- I discussed with the patient the risks of Odomzo including but not limited to nausea, vomiting, diarrhea, constipation, weight loss, changes in the sense of taste, decreased appetite, muscle spasms, and hair loss.  The patient verbalized understanding of the proper use and possible adverse effects of Odomzo.  All of the patient's questions and concerns were addressed.
Azithromycin Pregnancy And Lactation Text: This medication is considered safe during pregnancy and is also secreted in breast milk.
Infliximab Pregnancy And Lactation Text: This medication is Pregnancy Category B and is considered safe during pregnancy. It is unknown if this medication is excreted in breast milk.
Thalidomide Pregnancy And Lactation Text: This medication is Pregnancy Category X and is absolutely contraindicated during pregnancy. It is unknown if it is excreted in breast milk.
Soolantra Pregnancy And Lactation Text: This medication is Pregnancy Category C. This medication is considered safe during breast feeding.
Taltz Pregnancy And Lactation Text: The risk during pregnancy and breastfeeding is uncertain with this medication.
Low Dose Naltrexone Pregnancy And Lactation Text: Naltrexone is pregnancy category C.  There have been no adequate and well-controlled studies in pregnant women.  It should be used in pregnancy only if the potential benefit justifies the potential risk to the fetus.   Limited data indicates that naltrexone is minimally excreted into breastmilk.
Winlevi Pregnancy And Lactation Text: This medication is considered safe during pregnancy and breastfeeding.
Wegovy Counseling: I reviewed the possible side effects including: thyroid tumors, kidney disease, gallbladder disease, abdominal pain, constipation, diarrhea, nausea, vomiting and pancreatitis. Do not take this medication if you have a history or family history of multiple endocrine neoplasia syndrome type 2. Side effects reviewed, pt to contact office should one occur.
Tremfya Counseling: I discussed with the patient the risks of guselkumab including but not limited to immunosuppression, serious infections, worsening of inflammatory bowel disease and drug reactions.  The patient understands that monitoring is required including a PPD at baseline and must alert us or the primary physician if symptoms of infection or other concerning signs are noted.
Bimzelx Pregnancy And Lactation Text: This medication crosses the placenta and the safety is uncertain during pregnancy. It is unknown if this medication is present in breast milk.
Itraconazole Counseling:  I discussed with the patient the risks of itraconazole including but not limited to liver damage, nausea/vomiting, neuropathy, and severe allergy.  The patient understands that this medication is best absorbed when taken with acidic beverages such as non-diet cola or ginger ale.  The patient understands that monitoring is required including baseline LFTs and repeat LFTs at intervals.  The patient understands that they are to contact us or the primary physician if concerning signs are noted.
High Dose Vitamin A Counseling: Side effects reviewed, pt to contact office should one occur.
VTAMA Counseling: I discussed with the patient that VTAMA is not for use in the eyes, mouth or mouth. They should call the office if they develop any signs of allergic reactions to VTAMA. The patient verbalized understanding of the proper use and possible adverse effects of VTAMA.  All of the patient's questions and concerns were addressed.
Klisyri Pregnancy And Lactation Text: It is unknown if this medication can harm a developing fetus or if it is excreted in breast milk.
Rifampin Pregnancy And Lactation Text: This medication is Pregnancy Category C and it isn't know if it is safe during pregnancy. It is also excreted in breast milk and should not be used if you are breast feeding.
Sarecycline Counseling: Patient advised regarding possible photosensitivity and discoloration of the teeth, skin, lips, tongue and gums.  Patient instructed to avoid sunlight, if possible.  When exposed to sunlight, patients should wear protective clothing, sunglasses, and sunscreen.  The patient was instructed to call the office immediately if the following severe adverse effects occur:  hearing changes, easy bruising/bleeding, severe headache, or vision changes.  The patient verbalized understanding of the proper use and possible adverse effects of sarecycline.  All of the patient's questions and concerns were addressed.
Xeljanz Counseling: I discussed with the patient the risks of Xeljanz therapy including increased risk of infection, liver issues, headache, diarrhea, or cold symptoms. Live vaccines should be avoided. They were instructed to call if they have any problems.
Minoxidil Counseling: Minoxidil is a topical medication which can increase blood flow where it is applied. It is uncertain how this medication increases hair growth. Side effects are uncommon and include stinging and allergic reactions.
Nemluvio Counseling: I discussed with the patient the risks of nemolizumab including but not limited to headache, gastrointestinal complaints, nasopharyngitis, musculoskeletal complaints, injection site reactions, and allergic reactions. The patient understands that monitoring is required and they must alert us or the primary physician if any side effects are noted.
Tranexamic Acid Counseling:  Patient advised of the small risk of bleeding problems with tranexamic acid. They were also instructed to call if they developed any nausea, vomiting or diarrhea. All of the patient's questions and concerns were addressed.
Bactrim Counseling:  I discussed with the patient the risks of sulfa antibiotics including but not limited to GI upset, allergic reaction, drug rash, diarrhea, dizziness, photosensitivity, and yeast infections.  Rarely, more serious reactions can occur including but not limited to aplastic anemia, agranulocytosis, methemoglobinemia, blood dyscrasias, liver or kidney failure, lung infiltrates or desquamative/blistering drug rashes.
Topical Retinoid counseling:  Patient advised to apply a pea-sized amount only at bedtime and wait 30 minutes after washing their face before applying.  If too drying, patient may add a non-comedogenic moisturizer. The patient verbalized understanding of the proper use and possible adverse effects of retinoids.  All of the patient's questions and concerns were addressed.
Niacinamide Counseling: I recommended taking niacin or niacinamide, also know as vitamin B3, twice daily. Recent evidence suggests that taking vitamin B3 (500 mg twice daily) can reduce the risk of actinic keratoses and non-melanoma skin cancers. Side effects of vitamin B3 include flushing and headache.
Zepbound Counseling: I reviewed the possible side effects including: thyroid tumors, kidney disease, gallbladder disease, abdominal pain, constipation, diarrhea, nausea, vomiting and pancreatitis. Do not take this medication if you have a history or family history of multiple endocrine neoplasia syndrome type 2. Side effects reviewed, pt to contact office should one occur.
Cimzia Counseling:  I discussed with the patient the risks of Cimzia including but not limited to immunosuppression, allergic reactions and infections.  The patient understands that monitoring is required including a PPD at baseline and must alert us or the primary physician if symptoms of infection or other concerning signs are noted.
Vtama Pregnancy And Lactation Text: It is unknown if this medication can cause problems during pregnancy and breastfeeding.
Itraconazole Pregnancy And Lactation Text: This medication is Pregnancy Category C and it isn't know if it is safe during pregnancy. It is also excreted in breast milk.
High Dose Vitamin A Pregnancy And Lactation Text: High dose vitamin A therapy is contraindicated during pregnancy and breast feeding.
Ketoconazole Counseling:   Patient counseled regarding improving absorption with orange juice.  Adverse effects include but are not limited to breast enlargement, headache, diarrhea, nausea, upset stomach, liver function test abnormalities, taste disturbance, and stomach pain.  There is a rare possibility of liver failure that can occur when taking ketoconazole. The patient understands that monitoring of LFTs may be required, especially at baseline. The patient verbalized understanding of the proper use and possible adverse effects of ketoconazole.  All of the patient's questions and concerns were addressed.
Minoxidil Pregnancy And Lactation Text: This medication has not been assigned a Pregnancy Risk Category but animal studies failed to show danger with the topical medication. It is unknown if the medication is excreted in breast milk.
Xelshiraz Pregnancy And Lactation Text: This medication is Pregnancy Category D and is not considered safe during pregnancy.  The risk during breast feeding is also uncertain.
Cimzia Pregnancy And Lactation Text: This medication crosses the placenta but can be considered safe in certain situations. Cimzia may be excreted in breast milk.
Bactrim Pregnancy And Lactation Text: This medication is Pregnancy Category D and is known to cause fetal risk.  It is also excreted in breast milk.
Sarecycline Pregnancy And Lactation Text: This medication is Pregnancy Category D and not consider safe during pregnancy. It is also excreted in breast milk.
Topical Retinoid Pregnancy And Lactation Text: This medication is Pregnancy Category C. It is unknown if this medication is excreted in breast milk.
Niacinamide Pregnancy And Lactation Text: These medications are considered safe during pregnancy.
Albendazole Counseling:  I discussed with the patient the risks of albendazole including but not limited to cytopenia, kidney damage, nausea/vomiting and severe allergy.  The patient understands that this medication is being used in an off-label manner.
Tranexamic Acid Pregnancy And Lactation Text: It is unknown if this medication is safe during pregnancy or breast feeding.
Nemluvio Pregnancy And Lactation Text: It is not known if Nemluvio causes fetal harm or is present in breast milk. Please proceed with caution if patients who are pregnant or breastfeeding.
Rituxan Counseling:  I discussed with the patient the risks of Rituxan infusions. Side effects can include infusion reactions, severe drug rashes including mucocutaneous reactions, reactivation of latent hepatitis and other infections and rarely progressive multifocal leukoencephalopathy.  All of the patient's questions and concerns were addressed.
Nsaids Counseling: NSAID Counseling: I discussed with the patient that NSAIDs should be taken with food. Prolonged use of NSAIDs can result in the development of stomach ulcers.  Patient advised to stop taking NSAIDs if abdominal pain occurs.  The patient verbalized understanding of the proper use and possible adverse effects of NSAIDs.  All of the patient's questions and concerns were addressed.
Tazorac Counseling:  Patient advised that medication is irritating and drying.  Patient may need to apply sparingly and wash off after an hour before eventually leaving it on overnight.  The patient verbalized understanding of the proper use and possible adverse effects of tazorac.  All of the patient's questions and concerns were addressed.
Xolair Counseling:  Patient informed of potential adverse effects including but not limited to fever, muscle aches, rash and allergic reactions.  The patient verbalized understanding of the proper use and possible adverse effects of Xolair.  All of the patient's questions and concerns were addressed.
Zoryve Counseling:  I discussed with the patient that Zoryve is not for use in the eyes, mouth or vagina. The most commonly reported side effects include diarrhea, headache, insomnia, application site pain, upper respiratory tract infections, and urinary tract infections.  All of the patient's questions and concerns were addressed.
Clofazimine Pregnancy And Lactation Text: This medication is Pregnancy Category C and isn't considered safe during pregnancy. It is excreted in breast milk.
Litfulo Pregnancy And Lactation Text: Based on animal studies, Lifulo may cause embryo-fetal harm when administered to pregnant women.  The medication should not be used in pregnancy.  Breastfeeding is not recommended during treatment.
Metronidazole Pregnancy And Lactation Text: This medication is Pregnancy Category B and considered safe during pregnancy.  It is also excreted in breast milk.
Oxybutynin Pregnancy And Lactation Text: This medication is Pregnancy Category B and is considered safe during pregnancy. It is unknown if it is excreted in breast milk.
Propranolol Counseling:  I discussed with the patient the risks of propranolol including but not limited to low heart rate, low blood pressure, low blood sugar, restlessness and increased cold sensitivity. They should call the office if they experience any of these side effects.
Detail Level: Simple
Hyrimoz Counseling:  I discussed with the patient the risks of adalimumab including but not limited to myelosuppression, immunosuppression, autoimmune hepatitis, demyelinating diseases, lymphoma, and serious infections.  The patient understands that monitoring is required including a PPD at baseline and must alert us or the primary physician if symptoms of infection or other concerning signs are noted.
Glycopyrrolate Counseling:  I discussed with the patient the risks of glycopyrrolate including but not limited to skin rash, drowsiness, dry mouth, difficulty urinating, and blurred vision.
Topical Sulfur Applications Counseling: Topical Sulfur Counseling: Patient counseled that this medication may cause skin irritation or allergic reactions.  In the event of skin irritation, the patient was advised to reduce the amount of the drug applied or use it less frequently.   The patient verbalized understanding of the proper use and possible adverse effects of topical sulfur application.  All of the patient's questions and concerns were addressed.
Calcipotriene Counseling:  I discussed with the patient the risks of calcipotriene including but not limited to erythema, scaling, itching, and irritation.
Dutasteride Female Counseling: Dutasteride Counseling:  I discussed with the patient the risks of use of dutasteride including but not limited to decreased libido and sexual dysfunction. Explained the teratogenic nature of the medication and stressed the importance of not getting pregnant during treatment. All of the patient's questions and concerns were addressed.
Acitretin Counseling:  I discussed with the patient the risks of acitretin including but not limited to hair loss, dry lips/skin/eyes, liver damage, hyperlipidemia, depression/suicidal ideation, photosensitivity.  Serious rare side effects can include but are not limited to pancreatitis, pseudotumor cerebri, bony changes, clot formation/stroke/heart attack.  Patient understands that alcohol is contraindicated since it can result in liver toxicity and significantly prolong the elimination of the drug by many years.
Spironolactone Counseling: Patient advised regarding risks of diarrhea, abdominal pain, hyperkalemia, birth defects (for female patients), liver toxicity and renal toxicity. The patient may need blood work to monitor liver and kidney function and potassium levels while on therapy. The patient verbalized understanding of the proper use and possible adverse effects of spironolactone.  All of the patient's questions and concerns were addressed.
Spevigo Counseling: I discussed with the patient the risks of Spevigo including but not limited to fatigue, nasuea, vomiting, headache, pruritus, urinary tract infection, an infusion related reactions.  The patient understands that monitoring is required including screening for tuberculosis at baseline and yearly screening thereafter while continuing Spevigo therapy. They should contact us if symptoms of infection or other concerning signs are noted.
Calcipotriene Pregnancy And Lactation Text: The use of this medication during pregnancy or lactation is not recommended as there is insufficient data.
Spironolactone Pregnancy And Lactation Text: This medication can cause feminization of the male fetus and should be avoided during pregnancy. The active metabolite is also found in breast milk.
Dutasteride Pregnancy And Lactation Text: This medication is absolutely contraindicated in women, especially during pregnancy and breast feeding. Feminization of male fetuses is possible if taking while pregnant.
Colchicine Counseling:  Patient counseled regarding adverse effects including but not limited to stomach upset (nausea, vomiting, stomach pain, or diarrhea).  Patient instructed to limit alcohol consumption while taking this medication.  Colchicine may reduce blood counts especially with prolonged use.  The patient understands that monitoring of kidney function and blood counts may be required, especially at baseline. The patient verbalized understanding of the proper use and possible adverse effects of colchicine.  All of the patient's questions and concerns were addressed.
Rhofade Counseling: Rhofade is a topical medication which can decrease superficial blood flow where applied. Side effects are uncommon and include stinging, redness and allergic reactions.
Minocycline Counseling: Patient advised regarding possible photosensitivity and discoloration of the teeth, skin, lips, tongue and gums.  Patient instructed to avoid sunlight, if possible.  When exposed to sunlight, patients should wear protective clothing, sunglasses, and sunscreen.  The patient was instructed to call the office immediately if the following severe adverse effects occur:  hearing changes, easy bruising/bleeding, severe headache, or vision changes.  The patient verbalized understanding of the proper use and possible adverse effects of minocycline.  All of the patient's questions and concerns were addressed.
Hydroquinone Counseling:  Patient advised that medication may result in skin irritation, lightening (hypopigmentation), dryness, and burning.  In the event of skin irritation, the patient was advised to reduce the amount of the drug applied or use it less frequently.  Rarely, spots that are treated with hydroquinone can become darker (pseudoochronosis).  Should this occur, patient instructed to stop medication and call the office. The patient verbalized understanding of the proper use and possible adverse effects of hydroquinone.  All of the patient's questions and concerns were addressed.
Olumiant Counseling: I discussed with the patient the risks of Olumiant therapy including but not limited to upper respiratory tract infections, shingles, cold sores, and nausea. Live vaccines should be avoided.  This medication has been linked to serious infections; higher rate of mortality; malignancy and lymphoproliferative disorders; major adverse cardiovascular events; thrombosis; gastrointestinal perforations; neutropenia; lymphopenia; anemia; liver enzyme elevations; and lipid elevations.
Erivedge Counseling- I discussed with the patient the risks of Erivedge including but not limited to nausea, vomiting, diarrhea, constipation, weight loss, changes in the sense of taste, decreased appetite, muscle spasms, and hair loss.  The patient verbalized understanding of the proper use and possible adverse effects of Erivedge.  All of the patient's questions and concerns were addressed.
Rhofade Pregnancy And Lactation Text: This medication has not been assigned a Pregnancy Risk Category. It is unknown if the medication is excreted in breast milk.
Include Pregnancy/Lactation Warning?: No
Propranolol Pregnancy And Lactation Text: This medication is Pregnancy Category C and it isn't known if it is safe during pregnancy. It is excreted in breast milk.
Saxenda Counseling: I reviewed the possible side effects including: thyroid tumors, kidney disease, gallbladder disease, abdominal pain, constipation, diarrhea, nausea, vomiting and pancreatitis. Do not take this medication if you have a history or family history of multiple endocrine neoplasia syndrome type 2. Side effects reviewed, pt to contact office should one occur.
Glycopyrrolate Pregnancy And Lactation Text: This medication is Pregnancy Category B and is considered safe during pregnancy. It is unknown if it is excreted breast milk.
Spevigo Pregnancy And Lactation Text: The risk during pregnancy and breastfeeding is uncertain with this medication. This medication does cross the placenta. It is unknown if this medication is found in breast milk.
Methotrexate Counseling:  Patient counseled regarding adverse effects of methotrexate including but not limited to nausea, vomiting, abnormalities in liver function tests. Patients may develop mouth sores, rash, diarrhea, and abnormalities in blood counts. The patient understands that monitoring is required including LFT's and blood counts.  There is a rare possibility of scarring of the liver and lung problems that can occur when taking methotrexate. Persistent nausea, loss of appetite, pale stools, dark urine, cough, and shortness of breath should be reported immediately. Patient advised to discontinue methotrexate treatment at least three months before attempting to become pregnant.  I discussed the need for folate supplements while taking methotrexate.  These supplements can decrease side effects during methotrexate treatment. The patient verbalized understanding of the proper use and possible adverse effects of methotrexate.  All of the patient's questions and concerns were addressed.
Acitretin Pregnancy And Lactation Text: This medication is Pregnancy Category X and should not be given to women who are pregnant or may become pregnant in the future. This medication is excreted in breast milk.
Topical Sulfur Applications Pregnancy And Lactation Text: This medication is Pregnancy Category C and has an unknown safety profile during pregnancy. It is unknown if this topical medication is excreted in breast milk.
Hydroxychloroquine Counseling:  I discussed with the patient that a baseline ophthalmologic exam is needed at the start of therapy and every year thereafter while on therapy. A CBC may also be warranted for monitoring.  The side effects of this medication were discussed with the patient, including but not limited to agranulocytosis, aplastic anemia, seizures, rashes, retinopathy, and liver toxicity. Patient instructed to call the office should any adverse effect occur.  The patient verbalized understanding of the proper use and possible adverse effects of Plaquenil.  All the patient's questions and concerns were addressed.
Stelara Counseling:  I discussed with the patient the risks of ustekinumab including but not limited to immunosuppression, malignancy, posterior leukoencephalopathy syndrome, and serious infections.  The patient understands that monitoring is required including a PPD at baseline and must alert us or the primary physician if symptoms of infection or other concerning signs are noted.
Fluconazole Counseling:  Patient counseled regarding adverse effects of fluconazole including but not limited to headache, diarrhea, nausea, upset stomach, liver function test abnormalities, taste disturbance, and stomach pain.  There is a rare possibility of liver failure that can occur when taking fluconazole.  The patient understands that monitoring of LFTs and kidney function test may be required, especially at baseline. The patient verbalized understanding of the proper use and possible adverse effects of fluconazole.  All of the patient's questions and concerns were addressed.
Bexarotene Counseling:  I discussed with the patient the risks of bexarotene including but not limited to hair loss, dry lips/skin/eyes, liver abnormalities, hyperlipidemia, pancreatitis, depression/suicidal ideation, photosensitivity, drug rash/allergic reactions, hypothyroidism, anemia, leukopenia, infection, cataracts, and teratogenicity.  Patient understands that they will need regular blood tests to check lipid profile, liver function tests, white blood cell count, thyroid function tests and pregnancy test if applicable.
Wartpeel Counseling:  I discussed with the patient the risks of Wartpeel including but not limited to erythema, scaling, itching, weeping, crusting, and pain.
Cantharidin Counseling:  I discussed with the patient the risks of Cantharidin including but not limited to pain, redness, burning, itching, and blistering.
Olumiant Pregnancy And Lactation Text: Based on animal studies, Olumiant may cause embryo-fetal harm when administered to pregnant women.  The medication should not be used in pregnancy.  Breastfeeding is not recommended during treatment.
Rinvoq Counseling: I discussed with the patient the risks of Rinvoq therapy including but not limited to upper respiratory tract infections, shingles, cold sores, bronchitis, nausea, cough, fever, acne, and headache. Live vaccines should be avoided.  This medication has been linked to serious infections; higher rate of mortality; malignancy and lymphoproliferative disorders; major adverse cardiovascular events; thrombosis; thrombocytopenia, anemia, and neutropenia; lipid elevations; liver enzyme elevations; and gastrointestinal perforations.
Quinolones Counseling:  I discussed with the patient the risks of fluoroquinolones including but not limited to GI upset, allergic reaction, drug rash, diarrhea, dizziness, photosensitivity, yeast infections, liver function test abnormalities, tendonitis/tendon rupture.
Ilumya Counseling: I discussed with the patient the risks of tildrakizumab including but not limited to immunosuppression, malignancy, posterior leukoencephalopathy syndrome, and serious infections.  The patient understands that monitoring is required including a PPD at baseline and must alert us or the primary physician if symptoms of infection or other concerning signs are noted.
Imiquimod Counseling:  I discussed with the patient the risks of imiquimod including but not limited to erythema, scaling, itching, weeping, crusting, and pain.  Patient understands that the inflammatory response to imiquimod is variable from person to person and was educated regarded proper titration schedule.  If flu-like symptoms develop, patient knows to discontinue the medication and contact us.
SSKI Counseling:  I discussed with the patient the risks of SSKI including but not limited to thyroid abnormalities, metallic taste, GI upset, fever, headache, acne, arthralgias, paraesthesias, lymphadenopathy, easy bleeding, arrhythmias, and allergic reaction.
Methotrexate Pregnancy And Lactation Text: This medication is Pregnancy Category X and is known to cause fetal harm. This medication is excreted in breast milk.
Solaraze Counseling:  I discussed with the patient the risks of Solaraze including but not limited to erythema, scaling, itching, weeping, crusting, and pain.
Cimetidine Counseling:  I discussed with the patient the risks of Cimetidine including but not limited to gynecomastia, headache, diarrhea, nausea, drowsiness, arrhythmias, pancreatitis, skin rashes, psychosis, bone marrow suppression and kidney toxicity.
Opioid Counseling: I discussed with the patient the potential side effects of opioids including but not limited to addiction, altered mental status, and depression. I stressed avoiding alcohol, benzodiazepines, muscle relaxants and sleep aids unless specifically okayed by a physician. The patient verbalized understanding of the proper use and possible adverse effects of opioids. All of the patient's questions and concerns were addressed. They were instructed to flush the remaining pills down the toilet if they did not need them for pain.
Semaglutide Counseling: I reviewed the possible side effects including: thyroid tumors, kidney disease, gallbladder disease, abdominal pain, constipation, diarrhea, nausea, vomiting and pancreatitis. Do not take this medication if you have a history or family history of multiple endocrine neoplasia syndrome type 2. Side effects reviewed, pt to contact office should one occur.
Solaraze Pregnancy And Lactation Text: This medication is Pregnancy Category B and is considered safe. There is some data to suggest avoiding during the third trimester. It is unknown if this medication is excreted in breast milk.
Adbry Counseling: I discussed with the patient the risks of tralokinumab including but not limited to eye infection and irritation, cold sores, injection site reactions, worsening of asthma, allergic reactions and increased risk of parasitic infection.  Live vaccines should be avoided while taking tralokinumab. The patient understands that monitoring is required and they must alert us or the primary physician if symptoms of infection or other concerning signs are noted.
Wartpeel Pregnancy And Lactation Text: This medication is Pregnancy Category X and contraindicated in pregnancy and in women who may become pregnant. It is unknown if this medication is excreted in breast milk.
Bexarotene Pregnancy And Lactation Text: This medication is Pregnancy Category X and should not be given to women who are pregnant or may become pregnant. This medication should not be used if you are breast feeding.
Griseofulvin Counseling:  I discussed with the patient the risks of griseofulvin including but not limited to photosensitivity, cytopenia, liver damage, nausea/vomiting and severe allergy.  The patient understands that this medication is best absorbed when taken with a fatty meal (e.g., ice cream or french fries).
Rinvoq Pregnancy And Lactation Text: Based on animal studies, Rinvoq may cause embryo-fetal harm when administered to pregnant women.  The medication should not be used in pregnancy.  Breastfeeding is not recommended during treatment and for 6 days after the last dose.
Adbry Pregnancy And Lactation Text: It is unknown if this medication will adversely affect pregnancy or breast feeding.
Libtayo Counseling- I discussed with the patient the risks of Libtayo including but not limited to nausea, vomiting, diarrhea, and bone or muscle pain.  The patient verbalized understanding of the proper use and possible adverse effects of Libtayo.  All of the patient's questions and concerns were addressed.
Prednisone Counseling:  I discussed with the patient the risks of prolonged use of prednisone including but not limited to weight gain, insomnia, osteoporosis, mood changes, diabetes, susceptibility to infection, glaucoma and high blood pressure.  In cases where prednisone use is prolonged, patients should be monitored with blood pressure checks, serum glucose levels and an eye exam.  Additionally, the patient may need to be placed on GI prophylaxis, PCP prophylaxis, and calcium and vitamin D supplementation and/or a bisphosphonate.  The patient verbalized understanding of the proper use and the possible adverse effects of prednisone.  All of the patient's questions and concerns were addressed.
Hydroxychloroquine Pregnancy And Lactation Text: This medication has been shown to cause fetal harm but it isn't assigned a Pregnancy Risk Category. There are small amounts excreted in breast milk.
Sski Pregnancy And Lactation Text: This medication is Pregnancy Category D and isn't considered safe during pregnancy. It is excreted in breast milk.
Oral Minoxidil Pregnancy And Lactation Text: This medication should only be used when clearly needed if you are pregnant, attempting to become pregnant or breast feeding.
Cellcept Counseling:  I discussed with the patient the risks of mycophenolate mofetil including but not limited to infection/immunosuppression, GI upset, hypokalemia, hypercholesterolemia, bone marrow suppression, lymphoproliferative disorders, malignancy, GI ulceration/bleed/perforation, colitis, interstitial lung disease, kidney failure, progressive multifocal leukoencephalopathy, and birth defects.  The patient understands that monitoring is required including a baseline creatinine and regular CBC testing. In addition, patient must alert us immediately if symptoms of infection or other concerning signs are noted.
Doxepin Pregnancy And Lactation Text: This medication is Pregnancy Category C and it isn't known if it is safe during pregnancy. It is also excreted in breast milk and breast feeding isn't recommended.
Finasteride Male Counseling: Finasteride Counseling:  I discussed with the patient the risks of use of finasteride including but not limited to decreased libido, decreased ejaculate volume, gynecomastia, and depression. Women should not handle medication.  All of the patient's questions and concerns were addressed.
Azelaic Acid Pregnancy And Lactation Text: This medication is considered safe during pregnancy and breast feeding.
Hydroxyzine Counseling: Patient advised that the medication is sedating and not to drive a car after taking this medication.  Patient informed of potential adverse effects including but not limited to dry mouth, urinary retention, and blurry vision.  The patient verbalized understanding of the proper use and possible adverse effects of hydroxyzine.  All of the patient's questions and concerns were addressed.
Ebglyss Pregnancy And Lactation Text: This medication likely crosses the placenta but the risk for the fetus is uncertain. It is unknown if this medication is excreted in breast milk.
Doxycycline Pregnancy And Lactation Text: This medication is Pregnancy Category D and not consider safe during pregnancy. It is also excreted in breast milk but is considered safe for shorter treatment courses.
Otezla Counseling: The side effects of Otezla were discussed with the patient, including but not limited to worsening or new depression, weight loss, diarrhea, nausea, upper respiratory tract infection, and headache. Patient instructed to call the office should any adverse effect occur.  The patient verbalized understanding of the proper use and possible adverse effects of Otezla.  All the patient's questions and concerns were addressed.
Simponi Counseling:  I discussed with the patient the risks of golimumab including but not limited to myelosuppression, immunosuppression, autoimmune hepatitis, demyelinating diseases, lymphoma, and serious infections.  The patient understands that monitoring is required including a PPD at baseline and must alert us or the primary physician if symptoms of infection or other concerning signs are noted.
Enbrel Counseling:  I discussed with the patient the risks of etanercept including but not limited to myelosuppression, immunosuppression, autoimmune hepatitis, demyelinating diseases, lymphoma, and infections.  The patient understands that monitoring is required including a PPD at baseline and must alert us or the primary physician if symptoms of infection or other concerning signs are noted.
Cellcept Pregnancy And Lactation Text: This medication is Pregnancy Category D and isn't considered safe during pregnancy. It is unknown if this medication is excreted in breast milk.
Dapsone Counseling: I discussed with the patient the risks of dapsone including but not limited to hemolytic anemia, agranulocytosis, rashes, methemoglobinemia, kidney failure, peripheral neuropathy, headaches, GI upset, and liver toxicity.  Patients who start dapsone require monitoring including baseline LFTs and weekly CBCs for the first month, then every month thereafter.  The patient verbalized understanding of the proper use and possible adverse effects of dapsone.  All of the patient's questions and concerns were addressed.
Topical Metronidazole Counseling: Metronidazole is a topical antibiotic medication. You may experience burning, stinging, redness, or allergic reactions.  Please call our office if you develop any problems from using this medication.
Benzoyl Peroxide Counseling: Patient counseled that medicine may cause skin irritation and bleach clothing.  In the event of skin irritation, the patient was advised to reduce the amount of the drug applied or use it less frequently.   The patient verbalized understanding of the proper use and possible adverse effects of benzoyl peroxide.  All of the patient's questions and concerns were addressed.
Finasteride Female Counseling: Finasteride Counseling:  I discussed with the patient the risks of use of finasteride including but not limited to decreased libido and sexual dysfunction. Explained the teratogenic nature of the medication and stressed the importance of not getting pregnant during treatment. All of the patient's questions and concerns were addressed.
Benzoyl Peroxide Pregnancy And Lactation Text: This medication is Pregnancy Category C. It is unknown if benzoyl peroxide is excreted in breast milk.
Dapsone Pregnancy And Lactation Text: This medication is Pregnancy Category C and is not considered safe during pregnancy or breast feeding.
Elidel Counseling: Patient may experience a mild burning sensation during topical application. Elidel is not approved in children less than 2 years of age. There have been case reports of hematologic and skin malignancies in patients using topical calcineurin inhibitors although causality is questionable.
Arava Counseling:  Patient counseled regarding adverse effects of Arava including but not limited to nausea, vomiting, abnormalities in liver function tests. Patients may develop mouth sores, rash, diarrhea, and abnormalities in blood counts. The patient understands that monitoring is required including LFTs and blood counts.  There is a rare possibility of scarring of the liver and lung problems that can occur when taking methotrexate. Persistent nausea, loss of appetite, pale stools, dark urine, cough, and shortness of breath should be reported immediately. Patient advised to discontinue Arava treatment and consult with a physician prior to attempting conception. The patient will have to undergo a treatment to eliminate Arava from the body prior to conception.
Protopic Counseling: Patient may experience a mild burning sensation during topical application. Protopic is not approved in children less than 2 years of age. There have been case reports of hematologic and skin malignancies in patients using topical calcineurin inhibitors although causality is questionable.
Hydroxyzine Pregnancy And Lactation Text: This medication is not safe during pregnancy and should not be taken. It is also excreted in breast milk and breast feeding isn't recommended.
Erythromycin Counseling:  I discussed with the patient the risks of erythromycin including but not limited to GI upset, allergic reaction, drug rash, diarrhea, increase in liver enzymes, and yeast infections.
Cibinqo Counseling: I discussed with the patient the risks of Cibinqo therapy including but not limited to common cold, nausea, headache, cold sores, increased blood CPK levels, dizziness, UTIs, fatigue, acne, and vomitting. Live vaccines should be avoided.  This medication has been linked to serious infections; higher rate of mortality; malignancy and lymphoproliferative disorders; major adverse cardiovascular events; thrombosis; thrombocytopenia and lymphopenia; lipid elevations; and retinal detachment.
Protopic Pregnancy And Lactation Text: This medication is Pregnancy Category C. It is unknown if this medication is excreted in breast milk when applied topically.
Erythromycin Pregnancy And Lactation Text: This medication is Pregnancy Category B and is considered safe during pregnancy. It is also excreted in breast milk.
Cyclophosphamide Counseling:  I discussed with the patient the risks of cyclophosphamide including but not limited to hair loss, hormonal abnormalities, decreased fertility, abdominal pain, diarrhea, nausea and vomiting, bone marrow suppression and infection. The patient understands that monitoring is required while taking this medication.
Otezla Pregnancy And Lactation Text: This medication is Pregnancy Category C and it isn't known if it is safe during pregnancy. It is unknown if it is excreted in breast milk.
Topical Metronidazole Pregnancy And Lactation Text: This medication is Pregnancy Category B and considered safe during pregnancy.  It is also considered safe to use while breastfeeding.
Finasteride Pregnancy And Lactation Text: This medication is absolutely contraindicated during pregnancy. It is unknown if it is excreted in breast milk.
Birth Control Pills Counseling: Birth Control Pill Counseling: I discussed with the patient the potential side effects of OCPs including but not limited to increased risk of stroke, heart attack, thrombophlebitis, deep venous thrombosis, hepatic adenomas, breast changes, GI upset, headaches, and depression.  The patient verbalized understanding of the proper use and possible adverse effects of OCPs. All of the patient's questions and concerns were addressed.
Skyrizi Counseling: I discussed with the patient the risks of risankizumab-rzaa including but not limited to immunosuppression, and serious infections.  The patient understands that monitoring is required including a PPD at baseline and must alert us or the primary physician if symptoms of infection or other concerning signs are noted.
Gabapentin Counseling: I discussed with the patient the risks of gabapentin including but not limited to dizziness, somnolence, fatigue and ataxia.
Topical Steroids Counseling: I discussed with the patient that prolonged use of topical steroids can result in the increased appearance of superficial blood vessels (telangiectasias), lightening (hypopigmentation) and thinning of the skin (atrophy).  Patient understands to avoid using high potency steroids in skin folds, the groin or the face.  The patient verbalized understanding of the proper use and possible adverse effects of topical steroids.  All of the patient's questions and concerns were addressed.
Carac Counseling:  I discussed with the patient the risks of Carac including but not limited to erythema, scaling, itching, weeping, crusting, and pain.
Cibinqo Pregnancy And Lactation Text: It is unknown if this medication will adversely affect pregnancy or breast feeding.  You should not take this medication if you are currently pregnant or planning a pregnancy or while breastfeeding.
Litfulo Counseling: I discussed with the patient the risks of Litfulo therapy including but not limited to upper respiratory tract infections, shingles, cold sores, and nausea. Live vaccines should be avoided.  This medication has been linked to serious infections; higher rate of mortality; malignancy and lymphoproliferative disorders; major adverse cardiovascular events; thrombosis; gastrointestinal perforations; neutropenia; lymphopenia; anemia; liver enzyme elevations; and lipid elevations.
Metronidazole Counseling:  I discussed with the patient the risks of metronidazole including but not limited to seizures, nausea/vomiting, a metallic taste in the mouth, nausea/vomiting and severe allergy.
Humira Counseling:  I discussed with the patient the risks of adalimumab including but not limited to myelosuppression, immunosuppression, autoimmune hepatitis, demyelinating diseases, lymphoma, and serious infections.  The patient understands that monitoring is required including a PPD at baseline and must alert us or the primary physician if symptoms of infection or other concerning signs are noted.
Eucrisa Counseling: Patient may experience a mild burning sensation during topical application. Eucrisa is not approved in children less than 2 years of age.
Oxybutynin Counseling:  I discussed with the patient the risks of oxybutynin including but not limited to skin rash, drowsiness, dry mouth, difficulty urinating, and blurred vision.
Cyclophosphamide Pregnancy And Lactation Text: This medication is Pregnancy Category D and it isn't considered safe during pregnancy. This medication is excreted in breast milk.
Qbrexza Counseling:  I discussed with the patient the risks of Qbrexza including but not limited to headache, mydriasis, blurred vision, dry eyes, nasal dryness, dry mouth, dry throat, dry skin, urinary hesitation, and constipation.  Local skin reactions including erythema, burning, stinging, and itching can also occur.
Cyclosporine Counseling:  I discussed with the patient the risks of cyclosporine including but not limited to hypertension, gingival hyperplasia,myelosuppression, immunosuppression, liver damage, kidney damage, neurotoxicity, lymphoma, and serious infections. The patient understands that monitoring is required including baseline blood pressure, CBC, CMP, lipid panel and uric acid, and then 1-2 times monthly CMP and blood pressure.
Ozempic Counseling: I reviewed the possible side effects including: thyroid tumors, kidney disease, gallbladder disease, abdominal pain, constipation, diarrhea, nausea, vomiting and pancreatitis. Do not take this medication if you have a history or family history of multiple endocrine neoplasia syndrome type 2. Side effects reviewed, pt to contact office should one occur.
Qbrexza Pregnancy And Lactation Text: There is no available data on Qbrexza use in pregnant women.  There is no available data on Qbrexza use in lactation.
Dutasteride Male Counseling: Dustasteride Counseling:  I discussed with the patient the risks of use of dutasteride including but not limited to decreased libido, decreased ejaculate volume, and gynecomastia. Women who can become pregnant should not handle medication.  All of the patient's questions and concerns were addressed.
Topical Steroids Applications Pregnancy And Lactation Text: Most topical steroids are considered safe to use during pregnancy and lactation.  Any topical steroid applied to the breast or nipple should be washed off before breastfeeding.
Birth Control Pills Pregnancy And Lactation Text: This medication should be avoided if pregnant and for the first 30 days post-partum.
Clofazimine Counseling:  I discussed with the patient the risks of clofazimine including but not limited to skin and eye pigmentation, liver damage, nausea/vomiting, gastrointestinal bleeding and allergy.
Cosentyx Counseling:  I discussed with the patient the risks of Cosentyx including but not limited to worsening of Crohn's disease, immunosuppression, allergic reactions and infections.  The patient understands that monitoring is required including a PPD at baseline and must alert us or the primary physician if symptoms of infection or other concerning signs are noted.
Cephalexin Counseling: I counseled the patient regarding use of cephalexin as an antibiotic for prophylactic and/or therapeutic purposes. Cephalexin (commonly prescribed under brand name Keflex) is a cephalosporin antibiotic which is active against numerous classes of bacteria, including most skin bacteria. Side effects may include nausea, diarrhea, gastrointestinal upset, rash, hives, yeast infections, and in rare cases, hepatitis, kidney disease, seizures, fever, confusion, neurologic symptoms, and others. Patients with severe allergies to penicillin medications are cautioned that there is about a 10% incidence of cross-reactivity with cephalosporins. When possible, patients with penicillin allergies should use alternatives to cephalosporins for antibiotic therapy.
Valtrex Counseling: I discussed with the patient the risks of valacyclovir including but not limited to kidney damage, nausea, vomiting and severe allergy.  The patient understands that if the infection seems to be worsening or is not improving, they are to call.
Albendazole Pregnancy And Lactation Text: This medication is Pregnancy Category C and it isn't known if it is safe during pregnancy. It is also excreted in breast milk.
Tetracycline Counseling: Patient counseled regarding possible photosensitivity and increased risk for sunburn.  Patient instructed to avoid sunlight, if possible.  When exposed to sunlight, patients should wear protective clothing, sunglasses, and sunscreen.  The patient was instructed to call the office immediately if the following severe adverse effects occur:  hearing changes, easy bruising/bleeding, severe headache, or vision changes.  The patient verbalized understanding of the proper use and possible adverse effects of tetracycline.  All of the patient's questions and concerns were addressed. Patient understands to avoid pregnancy while on therapy due to potential birth defects.
Mirvaso Counseling: Mirvaso is a topical medication which can decrease superficial blood flow where applied. Side effects are uncommon and include stinging, redness and allergic reactions.
Nsaids Pregnancy And Lactation Text: These medications are considered safe up to 30 weeks gestation. It is excreted in breast milk.
Ivermectin Counseling:  Patient instructed to take medication on an empty stomach with a full glass of water.  Patient informed of potential adverse effects including but not limited to nausea, diarrhea, dizziness, itching, and swelling of the extremities or lymph nodes.  The patient verbalized understanding of the proper use and possible adverse effects of ivermectin.  All of the patient's questions and concerns were addressed.
Cephalexin Pregnancy And Lactation Text: This medication is Pregnancy Category B and considered safe during pregnancy.  It is also excreted in breast milk but can be used safely for shorter doses.
Valtrex Pregnancy And Lactation Text: this medication is Pregnancy Category B and is considered safe during pregnancy. This medication is not directly found in breast milk but it's metabolite acyclovir is present.
Rituxan Pregnancy And Lactation Text: This medication is Pregnancy Category C and it isn't know if it is safe during pregnancy. It is unknown if this medication is excreted in breast milk but similar antibodies are known to be excreted.
Tazorac Pregnancy And Lactation Text: This medication is not safe during pregnancy. It is unknown if this medication is excreted in breast milk.
Xolair Pregnancy And Lactation Text: This medication is Pregnancy Category B and is considered safe during pregnancy. This medication is excreted in breast milk.
Ketoconazole Pregnancy And Lactation Text: This medication is Pregnancy Category C and it isn't know if it is safe during pregnancy. It is also excreted in breast milk and breast feeding isn't recommended.
Terbinafine Counseling: Patient counseling regarding adverse effects of terbinafine including but not limited to headache, diarrhea, rash, upset stomach, liver function test abnormalities, itching, taste/smell disturbance, nausea, abdominal pain, and flatulence.  There is a rare possibility of liver failure that can occur when taking terbinafine.  The patient understands that a baseline LFT and kidney function test may be required. The patient verbalized understanding of the proper use and possible adverse effects of terbinafine.  All of the patient's questions and concerns were addressed.
Zyclara Counseling:  I discussed with the patient the risks of imiquimod including but not limited to erythema, scaling, itching, weeping, crusting, and pain.  Patient understands that the inflammatory response to imiquimod is variable from person to person and was educated regarded proper titration schedule.  If flu-like symptoms develop, patient knows to discontinue the medication and contact us.
Opzelura Counseling:  I discussed with the patient the risks of Opzelura including but not limited to nasopharngitis, bronchitis, ear infection, eosinophila, hives, diarrhea, folliculitis, tonsillitis, and rhinorrhea.  Taken orally, this medication has been linked to serious infections; higher rate of mortality; malignancy and lymphoproliferative disorders; major adverse cardiovascular events; thrombosis; thrombocytopenia, anemia, and neutropenia; and lipid elevations.
Siliq Counseling:  I discussed with the patient the risks of Siliq including but not limited to new or worsening depression, suicidal thoughts and behavior, immunosuppression, malignancy, posterior leukoencephalopathy syndrome, and serious infections.  The patient understands that monitoring is required including a PPD at baseline and must alert us or the primary physician if symptoms of infection or other concerning signs are noted. There is also a special program designed to monitor depression which is required with Siliq.
Topical Clindamycin Counseling: Patient counseled that this medication may cause skin irritation or allergic reactions.  In the event of skin irritation, the patient was advised to reduce the amount of the drug applied or use it less frequently.   The patient verbalized understanding of the proper use and possible adverse effects of clindamycin.  All of the patient's questions and concerns were addressed.
Aklief counseling:  Patient advised to apply a pea-sized amount only at bedtime and wait 30 minutes after washing their face before applying.  If too drying, patient may add a non-comedogenic moisturizer.  The most commonly reported side effects including irritation, redness, scaling, dryness, stinging, burning, itching, and increased risk of sunburn.  The patient verbalized understanding of the proper use and possible adverse effects of retinoids.  All of the patient's questions and concerns were addressed.
Olanzapine Counseling- I discussed with the patient the common side effects of olanzapine including but are not limited to: lack of energy, dry mouth, increased appetite, sleepiness, tremor, constipation, dizziness, changes in behavior, or restlessness.  Explained that teenagers are more likely to experience headaches, abdominal pain, pain in the arms or legs, tiredness, and sleepiness.  Serious side effects include but are not limited: increased risk of death in elderly patients who are confused, have memory loss, or dementia-related psychosis; hyperglycemia; increased cholesterol and triglycerides; and weight gain.
Aklief Pregnancy And Lactation Text: It is unknown if this medication is safe to use during pregnancy.  It is unknown if this medication is excreted in breast milk.  Breastfeeding women should use the topical cream on the smallest area of the skin for the shortest time needed while breastfeeding.  Do not apply to nipple and areola.
Cimetidine Pregnancy And Lactation Text: This medication is Pregnancy Category B and is considered safe during pregnancy. It is also excreted in breast milk and breast feeding isn't recommended.
Dupixent Counseling: I discussed with the patient the risks of dupilumab including but not limited to eye infection and irritation, cold sores, injection site reactions, worsening of asthma, allergic reactions and increased risk of parasitic infection.  Live vaccines should be avoided while taking dupilumab. Dupilumab will also interact with certain medications such as warfarin and cyclosporine. The patient understands that monitoring is required and they must alert us or the primary physician if symptoms of infection or other concerning signs are noted.
Clindamycin Counseling: I counseled the patient regarding use of clindamycin as an antibiotic for prophylactic and/or therapeutic purposes. Clindamycin is active against numerous classes of bacteria, including skin bacteria. Side effects may include nausea, diarrhea, gastrointestinal upset, rash, hives, yeast infections, and in rare cases, colitis.
Opzelura Pregnancy And Lactation Text: There is insufficient data to evaluate drug-associated risk for major birth defects, miscarriage, or other adverse maternal or fetal outcomes.  There is a pregnancy registry that monitors pregnancy outcomes in pregnant persons exposed to the medication during pregnancy.  It is unknown if this medication is excreted in breast milk.  Do not breastfeed during treatment and for about 4 weeks after the last dose.
Dupixent Pregnancy And Lactation Text: This medication likely crosses the placenta but the risk for the fetus is uncertain. This medication is excreted in breast milk.
Clindamycin Pregnancy And Lactation Text: This medication can be used in pregnancy if certain situations. Clindamycin is also present in breast milk.
Azathioprine Counseling:  I discussed with the patient the risks of azathioprine including but not limited to myelosuppression, immunosuppression, hepatotoxicity, lymphoma, and infections.  The patient understands that monitoring is required including baseline LFTs, Creatinine, possible TPMP genotyping and weekly CBCs for the first month and then every 2 weeks thereafter.  The patient verbalized understanding of the proper use and possible adverse effects of azathioprine.  All of the patient's questions and concerns were addressed.
Cantharidin Pregnancy And Lactation Text: This medication has not been proven safe during pregnancy. It is unknown if this medication is excreted in breast milk.
Olanzapine Pregnancy And Lactation Text: This medication is pregnancy category C.   There are no adequate and well controlled trials with olanzapine in pregnant females.  Olanzapine should be used during pregnancy only if the potential benefit justifies the potential risk to the fetus.   In a study in lactating healthy women, olanzapine was excreted in breast milk.  It is recommended that women taking olanzapine should not breast feed.
Simlandi Counseling:  I discussed with the patient the risks of adalimumab including but not limited to myelosuppression, immunosuppression, autoimmune hepatitis, demyelinating diseases, lymphoma, and serious infections.  The patient understands that monitoring is required including a PPD at baseline and must alert us or the primary physician if symptoms of infection or other concerning signs are noted.
Doxepin Counseling:  Patient advised that the medication is sedating and not to drive a car after taking this medication. Patient informed of potential adverse effects including but not limited to dry mouth, urinary retention, and blurry vision.  The patient verbalized understanding of the proper use and possible adverse effects of doxepin.  All of the patient's questions and concerns were addressed.
Topical Ketoconazole Counseling: Patient counseled that this medication may cause skin irritation or allergic reactions.  In the event of skin irritation, the patient was advised to reduce the amount of the drug applied or use it less frequently.   The patient verbalized understanding of the proper use and possible adverse effects of ketoconazole.  All of the patient's questions and concerns were addressed.
Azelaic Acid Counseling: Patient counseled that medicine may cause skin irritation and to avoid applying near the eyes.  In the event of skin irritation, the patient was advised to reduce the amount of the drug applied or use it less frequently.   The patient verbalized understanding of the proper use and possible adverse effects of azelaic acid.  All of the patient's questions and concerns were addressed.
Ebglyss Counseling: I discussed with the patient the risks of lebrikizumab including but not limited to eye inflammation and irritation, cold sores, injection site reactions, allergic reactions and increased risk of parasitic infection. The patient understands that monitoring is required and they must alert us or the primary physician if symptoms of infection or other concerning signs are noted.
Drysol Counseling:  I discussed with the patient the risks of drysol/aluminum chloride including but not limited to skin rash, itching, irritation, burning.
Doxycycline Counseling:  Patient counseled regarding possible photosensitivity and increased risk for sunburn.  Patient instructed to avoid sunlight, if possible.  When exposed to sunlight, patients should wear protective clothing, sunglasses, and sunscreen.  The patient was instructed to call the office immediately if the following severe adverse effects occur:  hearing changes, easy bruising/bleeding, severe headache, or vision changes.  The patient verbalized understanding of the proper use and possible adverse effects of doxycycline.  All of the patient's questions and concerns were addressed.
Oral Minoxidil Counseling- I discussed with the patient the risks of oral minoxidil including but not limited to shortness of breath, swelling of the feet or ankles, dizziness, lightheadedness, unwanted hair growth and allergic reaction.  The patient verbalized understanding of the proper use and possible adverse effects of oral minoxidil.  All of the patient's questions and concerns were addressed.
Picato Counseling:  I discussed with the patient the risks of Picato including but not limited to erythema, scaling, itching, weeping, crusting, and pain.

## 2025-01-15 NOTE — PROCEDURE: ADDITIONAL NOTES
Render Risk Assessment In Note?: no
Detail Level: Simple
Additional Notes: Patient has noticed improvement using the Ketoconazole foam. He is not noticing as much itching or scaling. Recommended continued use for at least 4 weeks then patient may decrease frequency as tolerated.

## 2025-01-21 RX ORDER — KETOCONAZOLE 2 G/100G
AEROSOL, FOAM TOPICAL
Qty: 50 | Refills: 12 | Status: ERX

## 2025-01-22 DIAGNOSIS — K21.9 GASTROESOPHAGEAL REFLUX DISEASE: ICD-10-CM

## 2025-01-22 DIAGNOSIS — M1A.09X1 IDIOPATHIC CHRONIC GOUT OF MULTIPLE SITES WITH TOPHUS: ICD-10-CM

## 2025-01-22 DIAGNOSIS — I10 ESSENTIAL HYPERTENSION: ICD-10-CM

## 2025-01-22 NOTE — TELEPHONE ENCOUNTER
Received request via: Pharmacy    Was the patient seen in the last year in this department? Yes    Does the patient have an active prescription (recently filled or refills available) for medication(s) requested? No    Pharmacy Name: : [facility]/pharmacy #[id number] - [place], [place] - [address]     Does the patient have intermediate Plus and need 100-day supply? (This applies to ALL medications) Yes, quantity updated to 100 days

## 2025-01-24 RX ORDER — ALLOPURINOL 300 MG/1
300 TABLET ORAL
Qty: 100 TABLET | Refills: 3 | Status: SHIPPED | OUTPATIENT
Start: 2025-01-24

## 2025-01-24 RX ORDER — TRIAMTERENE AND HYDROCHLOROTHIAZIDE 37.5; 25 MG/1; MG/1
1 TABLET ORAL
Qty: 100 TABLET | Refills: 3 | Status: SHIPPED | OUTPATIENT
Start: 2025-01-24

## 2025-02-28 ENCOUNTER — HOSPITAL ENCOUNTER (OUTPATIENT)
Dept: LAB | Facility: MEDICAL CENTER | Age: 70
End: 2025-02-28
Attending: STUDENT IN AN ORGANIZED HEALTH CARE EDUCATION/TRAINING PROGRAM
Payer: MEDICARE

## 2025-02-28 LAB — PSA SERPL DL<=0.01 NG/ML-MCNC: 4.22 NG/ML (ref 0–4)

## 2025-02-28 PROCEDURE — 36415 COLL VENOUS BLD VENIPUNCTURE: CPT

## 2025-02-28 PROCEDURE — 84153 ASSAY OF PSA TOTAL: CPT

## 2025-04-05 DIAGNOSIS — R91.1 PULMONARY NODULE: ICD-10-CM

## 2025-04-10 NOTE — Clinical Note
[facility]  [address] [place] [address]    [id number]    Max Alberto  [address] [place] [address]    April 10, 2025    Member Name: Max Alberto   Member Number: [id number]   Reference Number: [id number]   Approved Services: MRI/CAT Scan   Approved Service Dates: 04/05/2025 - 08/08/2025   Requesting Provider: Indiana Chen   Requested Provider: [facility]     Dear Max Alberto:    The following medical service(s) requested by Indiana Chen have been approved:    Procedure Code Procedure Code Name Requested Quantity Approved Quantity Status   43666 (CPT®) CHG DIAGNOSTIC COMPUTED TOMOGRAPHY THORAX W/O CNTRST 1 1 Authorized       Approved Quantity means the number of visits approved for medication treatments and/or medical services.    The services should be provided by [facility] no later than 08/08/2025. Please contact the provider listed below with any questions.     Provider Information:  [facility]  [phone]    Your plan benefit may require a deductible, co-payment or coinsurance for these services. This authorization does not guarantee [facility] will pay the claim for services that you receive. Payment by [facility] for these services is subject to the terms of your Evidence of Coverage, your eligibility at the time of service, and confirmation of benefit coverage.    For any questions or additional information, please contact Customer Service:    [facility] Toll Free: [phone]  POPAPPY users dial: [phone]   Call Center Hours:  Oct 1 - Mar 31, Mon - Fri 7 AM to 8 PM PST  Oct 1 - Mar 31, Sat - Sun 8 AM to 8 PM PST  Apr 1 - Sep 30, Mon - Fri 7 AM to 8 PM PST   Office Hours: Mon - Fri 8 AM to 5 PM PST   E-mail: [email or web address]   Website:  [email or web address]      This information is available for free in other languages. Please contact  Customer Service at the phone number above for more information. [facility] complies with applicable Federal civil rights laws and does not discriminate on the basis of race, color, national origin, age, disability or sex.    Sincerely,     Healthcare Utilization Management Department     Cc: [facility]   Indiana Chen    Multi-Language Insert  Multi- Services  English: We have free  services to answer any questions you may have about our health or drug plan.  To get an , just call us at [phone].  Someone who speaks English/Language can help you.  This is a free service.  Chinese: Tenemos servicios de intérprete sin costo alguno  para responder cualquier pregunta que pueda tener sobre nuestro plan de alejandro o medicamentos. Para hablar con un intérprete, por favor llame al [phone]. Alguien que hable español le podrá ayudar. Sofia es un servicio gratuito.  Chinese Mandarin: ?????????????????????????????? ???????????????? [phone]????????????????? ?????????  Chinese Cantonese: ?????????????????????????????? ????????????? [phone]???????????????????? ????????  Tagalog:  Agustina delaney serbisyo sa laytonsasalraza-usha mirandaumang a emily veliz hinggil sa rand roeong pangkalusuberenice o panggamot.  marjan Gibbons  [phone]. Maaari kayonkdoak Diego.  Prince elliott.  Citizen of the [place]:  Nous proposons julio services gratuits d'interprétation pour répondre à toutes julia questions relatives à notre régime de santé ou d'assurance-médicaments. Pour accéder au service d'interprétation, il vous suffit de nous appeler au [phone]. Un interlocuteur parMile Bluff Medical Centerquinn Françs pourra vous aider. Ce service est gratuit.  Hebrew:  Izabela zapata có d?ch v? thông d?ch mi?n phí ð? tr? l?i các câu h?i v? chýõng s?c kh?e và chýõng trình thu?c men. N?u  quí v? c?n thông d?ch viên richy g?i [phone] s? có nhân viên nói ti?ng Vi?t giúp ð? quí v?. Ðây là d?ch v? mi?n phí .  Pashto:  Unser kostenloser Dolmetscherservice beantwortet Ihren Fragen zu unserem Gesundheits- und Arzneimittelplan. Unsere Dolmetscher erreichen Sie [phone]. Man wird Ihnen ady auf [facility]. Dieser Service ist [facility].  Pashto:  ??? ?? ?? ?? ?? ??? ?? ??? ?? ???? ?? ?? ???? ???? ????. ?? ???? ????? ?? [phone] ??? ??? ????.  ???? ?? ???? ?? ?? ????. ? ???? ??? ?????.   Tanzanian: Åñëè ó âàñ âîçíèêíóò âîïðîñû îòíîñèòåëüíî ñòðàõîâîãî èëè ìåäèêàìåíòíîãî ïëàíà, âû ìîæåòå âîñïîëüçîâàòüñÿ íàøèìè áåñïëàòíûìè óñëóãàìè ïåðåâîä÷èêîâ. ×òîáû âîñïîëüçîâàòüñÿ óñëóãàìè ïåðåâîä÷èêà, ïîçâîíèòå íàì ïî òåëåôîíó [phone]. Âàì îêàæåò ïîìîùü ñîòðóäíèê, êîòîðûé ãîâîðèò ïî-póññêè. Äàííàÿ óñëóãà áåñïëàòíàÿ.  Slovak: ÅääÇ äÞÏã ÎÏãÇÊ ÇáãÊÑÌã ÇáÝæÑí ÇáãÌÇäíÉ ááÅÌÇÈÉ Úä Ãí ÃÓÆáÉ ÊÊÚáÞ ÈÇáÕÍÉ Ãæ ÌÏæá ÇáÃÏæíÉ áÏíäÇ. ááÍÕæá Úáì ãÊÑÌã ÝæÑí¡ áíÓ Úáíß Óæì ÇáÇÊÕÇá ÈäÇ Úáì [phone] . ÓíÞæã ÔÎÕ ãÇ íÊÍÏË ÇáÚÑÈíÉ ÈãÓÇÚÏÊß. åÐå ÎÏãÉ ãÌÇäíÉ.  Ward: ????? ????????? ?? ??? ?? ????? ?? ???? ??? ???? ???? ?? ?????? ?? ???? ???? ?? ??? ????? ??? ????? ???????? ?????? ?????? ???. ?? ???????? ??????? ???? ?? ???, ?? ???? [phone] ?? ??? ????. ??? ??????? ?? ?????? ????? ?? ???? ??? ?? ???? ??. ?? ?? ????? ???? ??.   Sri Lankan:  È disponibile un servizio di interpretariato gratuito per rispondere a eventuali domande sul nostro piano sanitario e farmaceutico. Per un interprete, contattare il leda [phone]. Un nostro incaricato kit parla Italianovi fornirà l'assistenza necessaria. È un servizio gratuito.  Portugués:  Dispomos de serviços de interpretação gratuitos para responder a qualquer questão que tenha acerca do nosso plano de saúde ou de medicação. Para obter um intérprete, contacte-nos através do número [phone]. Irá encontrar alguém que fale o idioma  Português para o ajudar. Sofia  serviço é gratuito.  Korean Creole:  Nou genyen sèvis entèprèt gratis shoaib reponn tout kesyon ou ta genyen konsènan plan medikal oswa dwòg nou an.  Shoaib jwenn yon entèprèt, jis rele nou nan [phone]. Yon moun ki pale Kreyòl kapab dinroa w.  Sa a se yon sèvis ki gratis.  Polish:  Umo¿liwiamy bezp³atne skorzystanie z us³ug t³umacza ustnego, który pomo¿e w uzyskaniu odpowiedzi na temat planu zdrowotnego lub dawjes elaine. Acrley skorzystaæ z pomocy t³umacza znaj¹cego lakisha smith¿y zadzwoniæ pod numer [phone]. Ta us³uga jest bezp³atna.  Citizen of [place]: ????? ??????? ????????????????????? ??????????????????????????????????[phone] ???????????????? ? ????????????????? ?????

## 2025-04-16 NOTE — PROGRESS NOTES
[facility] Follow-up Visit    Date of Visit: 4/18/2025     CC: Follow-up for DEDRA management      HPI:  Max Alberto is a very pleasant 69 y.o. year old male former smoker (25 pack-years, quit in 1994), with a PMHx of DEDRA, elevated BMI, GERD, HTN, migraines, seasonal allergies,   who presented to the Sleep [facility] for a regular follow up. Last seen in the office on 5/14/2024 with JASON Robbins.     Patient presents for annual compliance.  Patient states he has had a low compliance as of late due to having to take care of his wife after shoulder surgery.  He will have rare daytime drowsiness and snoring when his humidifier runs out.  He will have dry mouth and has tried several interventions, but continues to have a dry mouth.  He denies any significant morning headaches, drowsiness while driving, issues fine sleep, gasping and apneas, palpitations, aerophagia, nausea, or skin irritation.  Patient has the bed between 9-10 PM and wakes up between 3-4 AM.  He will have rare awakenings at night and will rarely nap.    DME provider: [facility]   Device: [employer]  Settings: BiPAP 015/10  Oxygen: 2 LPM  When: 3/2023  Mask: FFM  Chin strap: No     Compliance:  Compliance data reviewed showing 53% usage > 4hours in last 30 days. Average AHI 2.0 events/hour. 95% leaks 3.1 L/min. Patient continues to use and benefit from machine.      Sleep History:  HSS 5/11/2022-      PSG titration 6/26/2022-      OPO on BiPAP 17/12 CWP (Date: 3/31/2023)-  Interpretation:     This overnight oximetry was recorded on 4/2/2023 with the patient on BiPAP 17/12 CWP.  The analysis duration was 7 hours 20 minutes.  27 total oximetric events occurred encompassing 19.2 minutes .  The average event duration was 42.7 seconds .  The saturations were less than 90% for 74.3% of the recording.  The heidi saturation was 82% .  The patient spent 117.6 minutes with saturations < 88%.  Overall, this continuous nocturnal  oximetry demonstrates significant nocturnal desaturation while on positive airway pressure therapy.        Recommendation:     Clinical correlation is needed.  The patient may be a candidate for a change in BiPAP settings and or the addition of nocturnal bleed-in supplemental oxygen.         Patient Active Problem List    Diagnosis Date Noted    DEDRA treated with BiPAP 09/04/2014    BMI 40.0-44.9, adult (HCC) 09/04/2020    Elevated PSA 08/29/2024    IPMN (intraductal papillary mucinous neoplasm) 08/29/2024    Atherosclerosis of aorta (HCC) 01/10/2024    Angiomyolipoma of right kidney 08/16/2023    IFG (impaired fasting glucose) 08/16/2023    Chickenpox 08/15/2022    Liver hemangioma 01/31/2022    Adrenal nodule (HCC) 01/31/2022    Arthritis     Decreased hearing of right ear 10/01/2020    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/2018    Incisional hernia, without obstruction or gangrene 07/18/2017    Stage 3a chronic kidney disease 01/12/2017    Chronic lower back pain 06/23/2015    Dermatitis, seborrheic 06/23/2015    Dental disorder 12/17/2014    Tinnitus of both ears 10/13/2014    Chronic venous insufficiency 09/04/2014    HTN (hypertension) 09/04/2014    Migraines 09/04/2014    GERD (gastroesophageal reflux disease) 09/04/2014    Seasonal allergies 09/04/2014     Past Medical History:   Diagnosis Date    Adrenal nodule (HCC) 01/31/2022    Angiomyolipoma of right kidney 08/16/2023    Arthritis     Osteo-generalized    Atherosclerosis of aorta (HCC) 01/10/2024    8/16/2023  BANDAR Arana. HCC Gap Form  Chronic, stable. Continue with current defined treatment plan:  Follow-up at least annually.      Chickenpox     Chronic kidney disease (CKD), stage III (moderate) 01/12/2017    Chronic lower back pain 06/23/2015    Chronic venous insufficiency 09/04/2014    Decreased hearing of right ear 10/01/2020    Dental disorder 12/17/2014    had two extracted last week 12/09/14=were infected      Dermatitis, seborrheic 06/23/2015     IMO load March 2020    Elevated PSA 08/29/2024    GERD (gastroesophageal reflux disease)     Headache, classical migraine     HTN (hypertension) 09/04/2014    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/2018    IFG (impaired fasting glucose) 08/16/2023    Incisional hernia, without obstruction or gangrene 07/18/2017    IPMN (intraductal papillary mucinous neoplasm) 08/29/2024    Liver hemangioma 01/31/2022    Morbid obesity with BMI of 40.0-44.9, adult (HCC) 09/04/2020    Seasonal allergies 09/04/2014    Sleep apnea     BiPAP    Tinnitus of both ears 10/13/2014      Past Surgical History:   Procedure Laterality Date    UT LAP,DIAGNOSTIC ABDOMEN  8/7/2019    Procedure: LAPAROSCOPY-DIAGNOSTIC;  Surgeon: Julian Carmen M.D.;  Location: SURGERY [facility];  Service: General    LAPAROSCOPIC LYSIS OF ADHESIONS  8/7/2019    Procedure: LYSIS, ADHESIONS, LAPAROSCOPIC;  Surgeon: Julian Carmen M.D.;  Location: SURGERY [facility];  Service: General    INCISION HERNIA REPAIR  8/7/2019    Procedure: REPAIR, HERNIA, INCISIONAL- POSSIBLE;  Surgeon: Julian Carmen M.D.;  Location: SURGERY [facility];  Service: General    VENTRAL HERNIA REPAIR LAPAROSCOPIC  10/1/2018    Procedure: VENTRAL HERNIA REPAIR LAPAROSCOPIC- FOR INCISIONAL HERNIA WITH MESH;  Surgeon: Julian Carmen M.D.;  Location: SURGERY [facility];  Service: General    GASTRIC SLEEVE LAPAROSCOPY  12/29/2014    Performed by Julian Carmen M.D. at SURGERY [facility]    KNEE REPLACEMENT, TOTAL  2012    left    OTHER ORTHOPEDIC SURGERY  1998    bicep tendon repair    APPENDECTOMY      ARTHROSCOPY, KNEE      CHOLECYSTECTOMY      SLEEVE,ANA VASO THIGH      TONSILLECTOMY       Family History   Problem Relation Age of Onset    Diabetes Mother     Arthritis Mother     Lung Disease Father     Arthritis Father     Heart Disease Father     Prostate cancer Father     Hypertension Father     Alcohol/Drug Brother       Social History     Socioeconomic History    Marital status:      Spouse name: Not on file    Number of children: Not on file    Years of education: Not on file    Highest education level: Associate degree: occupational, technical, or vocational program   Occupational History    Not on file   Tobacco Use    Smoking status: Former     Current packs/day: 0.00     Average packs/day: 1 pack/day for 25.0 years (25.0 ttl pk-yrs)     Types: Cigarettes     Start date: 1969     Quit date: 1994     Years since quittin.3    Smokeless tobacco: Never   Vaping Use    Vaping status: Never Used   Substance and Sexual Activity    Alcohol use: Not Currently     Alcohol/week: 0.0 oz     Comment: one a month    Drug use: No    Sexual activity: Yes     Partners: Female   Other Topics Concern    Not on file   Social History Narrative    Not on file     Social Drivers of Health     Financial Resource Strain: Low Risk  (2024)    Overall Financial Resource Strain (CARDIA)     Difficulty of Paying Living Expenses: Not hard at all   Food Insecurity: No Food Insecurity (2024)    Hunger Vital Sign     Worried About Running Out of Food in the Last Year: Never true     Ran Out of Food in the Last Year: Never true   Transportation Needs: No Transportation Needs (2024)    PRAPARE - Transportation     Lack of Transportation (Medical): No     Lack of Transportation (Non-Medical): No   Physical Activity: Insufficiently Active (2024)    Exercise Vital Sign     Days of Exercise per Week: 2 days     Minutes of Exercise per Session: 30 min   Stress: No Stress Concern Present (2024)    [place] [employer] Occupational [employer] - Occupational Stress Questionnaire     Feeling of Stress : Not at all   Social Connections: Moderately Isolated (2024)    Social Connection and Isolation Panel [NHANES]     Frequency of Communication with Friends and Family: More than three times a week     Frequency of Social  "Gatherings with Friends and Family: Three times a week     Attends Mormonism Services: Never     Active Member of Clubs or Organizations: No     Attends Club or Organization Meetings: Never     Marital Status:    Intimate Partner Violence: Not on file   Housing Stability: Low Risk  (8/26/2024)    Housing Stability Vital Sign     Unable to Pay for Housing in the Last Year: No     Number of Times Moved in the Last Year: 0     Homeless in the Last Year: No     Current Outpatient Medications   Medication Sig Dispense Refill    triamterene-hctz (MAXZIDE-25/DYAZIDE) 37.5-25 MG Tab TAKE 1 TABLET BY MOUTH EVERY  Tablet 3    allopurinol (ZYLOPRIM) 300 MG Tab TAKE 1 TABLET BY MOUTH EVERY  Tablet 3    omeprazole (PRILOSEC) 20 MG delayed-release capsule TAKE 1 CAPSULE BY MOUTH EVERY  Capsule 3    amoxicillin (AMOXIL) 500 MG Cap TAKE 4 CAPS BY MOUTH before dentist appointment 30 Capsule 0    multivitamin (THERAGRAN) Tab Take 1 Tab by mouth every day.      acetaminophen (TYLENOL) 500 MG Tab Take 1,000 mg by mouth every 6 hours as needed (MIGRAINE).      vitamin D (CHOLECALCIFEROL) 1000 UNIT Tab Take 2,000 Units by mouth every day.       No current facility-administered medications for this visit.      ALLERGIES: Patient has no known allergies.    ROS:  Constitutional: Denies fever, chills, sweats,  weight loss, fatigue  Cardiovascular: Denies chest pain, tightness, palpitations, swelling in legs/feet  Respiratory: Denies shortness of breath, cough, sputum, wheezing, painful breathing   Sleep: per HPI  Gastrointestinal: Denies  difficulty swallowing, nausea, abdominal pain, diarrhea, constipation, heartburn.  Musculoskeletal: Denies painful joints, sore muscles,       PHYSICAL EXAM:  /70 (BP Location: Left arm, Patient Position: Sitting, BP Cuff Size: Large adult)   Pulse 62   Ht 1.854 m (6' 1\")   Wt (!) 144 kg (318 lb)   SpO2 96%   BMI 41.96 kg/m²   Appearance: Well-nourished, " well-developed, no acute distress  Eyes:  No scleral icterus , EOMI  ENMT: No redness of the oropharynx  Lung auscultation:  No wheezes rhonchi rubs or rales  Cardiac: No murmurs, rubs, or gallops; regular rhythm, normal rate; no edema  Musculoskeletal:  Grossly normal; gait and station normal; digits and nails normal  Skin:  No rashes, petechiae, cyanosis  Neurologic: without focal signs; oriented to person, time, place, and purpose; judgement intact  Psychiatric:  No depression, anxiety, agitation  Mallampati score: Class IV    Assessment and Plan:    The medical record was reviewed.    Diagnostic and titration nocturnal polysomnogram's, home sleep apnea tests, continuous nocturnal oximetry results, multiple sleep latency tests, and compliance reports reviewed.    Problem List Items Addressed This Visit          Pulmonary/Sleep Medicine Problems    DEDRA treated with BiPAP    Sleep Apnea:    The pathophysiology of sleep anea and the increased risk of cardiovascular morbidity from untreated sleep apnea is discussed in detail with the patient.  Urged to avoid supine sleep, weight gain and alcoholic beverages since all of these can worsen sleep apnea. Cautioned against drowsy driving. If feeling sleepy while driving, pull over for a break/nap, rather than persist on the road, in the interest of own safety and that of others on the road.  The risks of untreated sleep apnea were discussed with the patient at length. Patients with sleep apnea are at increased risk of cardiovascular disease including coronary artery disease, systemic arterial hypertension, pulmonary arterial hypertension, cardiac arrythmias, and stroke.  Positive airway pressure will favorably impact many of the adverse conditions and effects provoked by sleep apnea.    Plan:    Compliance download was reviewed and discussed with the patient.  Patient had a low compliance due to being a caregiver for his wife recently.  His AHI is well-controlled.   Recommended patient use the machine nightly for at least 4 hours a night.  Will see him back in 1 year for annual compliance.    - Order placed for mask and supplies to [employer]   - Compliance was reinforced  - Clean supplies a least once a week with dish soap and water and air dry  - Recommended the patient against the use of Ozone , such as SoClean  - Recommended the patient change out supplies as recommended for best mask fit and usage of the machine  - Equipment replacement schedule:  Mask cushion every month  Nasal pillows 2 times per month  Mask every 6 months  Head gear every 6 months  Tubing every 3 months  Ultra-fine filters 2 times per month  Foam filter every 6 months  Humidifier chamber every 6 months  Chin strap every 6 months    Has been advised to continue the current CPAP, clean equipment frequently, and get new mask and supplies as allowed by insurance and DME. Recommend an earlier appointment, if significant treatment barriers develop.    Advised patient to reach out via Inform[employer]hart if any questions or concerns should arise.          Relevant Orders    DME Mask and Supplies    BMI 40.0-44.9, adult (HCC)     Have advised the patient to follow up with the appropriate healthcare practitioners for all other medical problems and issues.    Return in about 1 year (around 4/18/2026), or if symptoms worsen or fail to improve, for compliance, with [facility].    Please note portions of this record was created using voice recognition software. I have made every reasonable attempt to correct obvious errors, but I expect that there are errors of grammar and possibly content I did not discover before finalizing the note.    Time spent in record review prior to patient arrival, reviewing results, and in face-to-face encounter totaled 15 min.  __________  JASON Moralez  Pulmonary & Sleep Medicine  [facility]

## 2025-04-18 ENCOUNTER — OFFICE VISIT (OUTPATIENT)
Dept: SLEEP MEDICINE | Facility: MEDICAL CENTER | Age: 70
End: 2025-04-18
Payer: MEDICARE

## 2025-04-18 VITALS
OXYGEN SATURATION: 96 % | BODY MASS INDEX: 41.75 KG/M2 | SYSTOLIC BLOOD PRESSURE: 118 MMHG | DIASTOLIC BLOOD PRESSURE: 70 MMHG | WEIGHT: 315 LBS | HEART RATE: 62 BPM | HEIGHT: 73 IN

## 2025-04-18 DIAGNOSIS — G47.33 OSA TREATED WITH BIPAP: ICD-10-CM

## 2025-04-18 PROCEDURE — 99212 OFFICE O/P EST SF 10 MIN: CPT

## 2025-04-18 PROCEDURE — 3074F SYST BP LT 130 MM HG: CPT

## 2025-04-18 PROCEDURE — 3078F DIAST BP <80 MM HG: CPT

## 2025-04-18 ASSESSMENT — FIBROSIS 4 INDEX: FIB4 SCORE: 2.52

## 2025-04-18 NOTE — ASSESSMENT & PLAN NOTE
Sleep Apnea:    The pathophysiology of sleep anea and the increased risk of cardiovascular morbidity from untreated sleep apnea is discussed in detail with the patient.  Urged to avoid supine sleep, weight gain and alcoholic beverages since all of these can worsen sleep apnea. Cautioned against drowsy driving. If feeling sleepy while driving, pull over for a break/nap, rather than persist on the road, in the interest of own safety and that of others on the road.  The risks of untreated sleep apnea were discussed with the patient at length. Patients with sleep apnea are at increased risk of cardiovascular disease including coronary artery disease, systemic arterial hypertension, pulmonary arterial hypertension, cardiac arrythmias, and stroke.  Positive airway pressure will favorably impact many of the adverse conditions and effects provoked by sleep apnea.    Plan:    Compliance download was reviewed and discussed with the patient.  Patient had a low compliance due to being a caregiver for his wife recently.  His AHI is well-controlled.  Recommended patient use the machine nightly for at least 4 hours a night.  Will see him back in 1 year for annual compliance.    - Order placed for mask and supplies to [employer]   - Compliance was reinforced  - Clean supplies a least once a week with dish soap and water and air dry  - Recommended the patient against the use of Ozone , such as SoClean  - Recommended the patient change out supplies as recommended for best mask fit and usage of the machine  - Equipment replacement schedule:  Mask cushion every month  Nasal pillows 2 times per month  Mask every 6 months  Head gear every 6 months  Tubing every 3 months  Ultra-fine filters 2 times per month  Foam filter every 6 months  Humidifier chamber every 6 months  Chin strap every 6 months    Has been advised to continue the current CPAP, clean equipment frequently, and get new mask and supplies as allowed by insurance and  DME. Recommend an earlier appointment, if significant treatment barriers develop.    Advised patient to reach out via MyChart if any questions or concerns should arise.

## 2025-04-22 ENCOUNTER — HOSPITAL ENCOUNTER (OUTPATIENT)
Dept: RADIOLOGY | Facility: MEDICAL CENTER | Age: 70
End: 2025-04-22
Attending: NURSE PRACTITIONER
Payer: MEDICARE

## 2025-04-22 DIAGNOSIS — R91.1 PULMONARY NODULE: ICD-10-CM

## 2025-04-22 PROCEDURE — 71250 CT THORAX DX C-: CPT

## 2025-04-24 ENCOUNTER — TELEPHONE (OUTPATIENT)
Dept: SLEEP MEDICINE | Facility: MEDICAL CENTER | Age: 70
End: 2025-04-24
Payer: MEDICARE

## 2025-04-24 NOTE — TELEPHONE ENCOUNTER
[facility]  [address] [place], [place] [address]  Phone: [phone]    [place] needs a new RX for the Pt. The current RX expires in May.

## 2025-04-24 NOTE — TELEPHONE ENCOUNTER
Called pt back and pt meant [facility]. States he needs a new supply order. Let patient know supplies rx was already sent

## 2025-04-26 ENCOUNTER — RESULTS FOLLOW-UP (OUTPATIENT)
Dept: MEDICAL GROUP | Facility: MEDICAL CENTER | Age: 70
End: 2025-04-26

## 2025-04-26 PROBLEM — I71.21 ASCENDING AORTIC ANEURYSM (HCC): Status: ACTIVE | Noted: 2025-04-26

## 2025-04-28 DIAGNOSIS — I77.810 ASCENDING AORTA DILATATION (HCC): ICD-10-CM

## 2025-04-28 DIAGNOSIS — R91.8 PULMONARY NODULES: ICD-10-CM

## 2025-04-28 DIAGNOSIS — I25.10 CALCIFICATION OF CORONARY ARTERY: ICD-10-CM

## 2025-05-04 NOTE — Clinical Note
REFERRAL APPROVAL NOTICE         Sent on May 4, 2025                   Max Alberto  [address] [place] [address]                   Dear Mr. Alberto,    After a careful review of the medical information and benefit coverage, [facility] has processed your referral. See below for additional details.    If applicable, you must be actively enrolled with your insurance for coverage of the authorized service. If you have any questions regarding your coverage, please contact your insurance directly.    REFERRAL INFORMATION   Referral #:  [id number]  Referred-To Department    Referred-By Provider:  Pulmonary and Sleep Medicine    RAFAEL Arana   Pulmonary/sleep [facility]      No address on file  Referring provider phone N/A [address], [place] [address]  [place] [id number] [phone]    Referral Start Date:  04/28/2025  Referral End Date:   04/28/2026           SCHEDULING  If you do not already have an appointment, please call [phone] to make an appointment.   MORE INFORMATION  As a reminder, [facility] ownership has changed, meaning this location is now owned and operated by [facility]. As such, we want to clarify that our patients should expect to receive two separate bills for the services received at [facility] - one representing the [facility] facility fees as the owner of the establishment, and the other to represent the physician's services and subsequent fees. You can speak with your insurance carrier for a pricing estimate by calling the customer service number on the back of your card and ask about charges for a hospital outpatient visit.  If you do not already have a [employer] account, sign up at: [email or web address]  You can access your medical information, make appointments, see lab results, billing information, and  more.  If you have questions regarding this referral, please contact  the [facility] department at:             [phone]. Monday - Friday 7:30AM - 5:00PM.      Sincerely,  [facility]

## 2025-05-12 ENCOUNTER — HOSPITAL ENCOUNTER (OUTPATIENT)
Dept: RADIOLOGY | Facility: MEDICAL CENTER | Age: 70
End: 2025-05-12
Attending: NURSE PRACTITIONER
Payer: MEDICARE

## 2025-05-12 DIAGNOSIS — I25.10 CALCIFICATION OF CORONARY ARTERY: ICD-10-CM

## 2025-05-12 DIAGNOSIS — I77.810 ASCENDING AORTA DILATATION (HCC): ICD-10-CM

## 2025-05-12 PROCEDURE — 78452 HT MUSCLE IMAGE SPECT MULT: CPT

## 2025-05-12 PROCEDURE — 700111 HCHG RX REV CODE 636 W/ 250 OVERRIDE (IP): Performed by: NURSE PRACTITIONER

## 2025-05-12 RX ORDER — REGADENOSON 0.08 MG/ML
0.4 INJECTION, SOLUTION INTRAVENOUS ONCE
Status: COMPLETED | OUTPATIENT
Start: 2025-05-12 | End: 2025-05-12

## 2025-05-12 RX ORDER — AMINOPHYLLINE 25 MG/ML
100 INJECTION, SOLUTION INTRAVENOUS
Status: DISCONTINUED | OUTPATIENT
Start: 2025-05-12 | End: 2025-05-13 | Stop reason: HOSPADM

## 2025-05-12 RX ADMIN — REGADENOSON 0.4 MG: 0.08 INJECTION, SOLUTION INTRAVENOUS at 09:22

## 2025-05-12 NOTE — PROGRESS NOTES
Subjective:     CC:  The encounter diagnosis was Pulmonary nodules.    HISTORY OF THE PRESENT ILLNESS: Patient is a 69 y.o. male. This pleasant patient is here today to establish care in the Lung Nodule Clinic and discuss pulmonary nodules. His referring provider is Indiana POOLE.    Pulmonary nodules- Max is a 69M former smoker with a 10 pack year smoking history (quit smoking 1995), who has GERD, HTN, CKD3, adrenal nodule who is presenting for evaluation of pulmonary nodules.  He was first noted to have pulmonary nodules on CT soft tissue neck 10/24/24 which showed a 6mm right upper lobe nodule.  Follow-up CT chest on 1/7/25 showed stability of the 6m right upper lobe nodule and newly noted 3mm right upper lobe nodule, 7mm right lower lobe groundglass nodule and 4mm left upper lobe pleural based nodules.  The 7mm ground glass nodule was no longer visualized on CT chest 4/22/25, but his other nodules remained stable.    His father had emphysema and was a heavy smoker.  He has significant second hand tobacco smoke exposure in the past. He has lived in [place] [place] and [place] (since 2012).  He has worked doing automotive work.  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   No known radon, radiation or TB exposure.  He has no personal hx of hospitalization for respiratory problems or been on a ventilator.  He has never had PNA, emphysema, COPD or asthma.    He has taken omeprazole for years and this is controlling his GERD well without symptoms.  He only has symptoms when he miss doses and last episode was about 6 months ago when he missed his dose for 2 days.  No fevers, chills, abnormal weight loss (has been slowly increasing weight since his gastric bypass surgery in 2013), palpitation.  He rarely has a twinge in his left chest that started about 3 months ago (lasts for 30 secs, occurs at rest and self resolves.  No other  associated symptoms).  He is seeing his PCP regarding his chest discomfort and she ordered a recent stress test.  No SOB or wheezing.  No cough, sputum, hemoptysis.  He does not walk long distance due to hip and knee pain.      Allergies: Patient has no known allergies.    Current Outpatient Medications Ordered in Epic   Medication Sig Dispense Refill    triamterene-hctz (MAXZIDE-25/DYAZIDE) 37.5-25 MG Tab TAKE 1 TABLET BY MOUTH EVERY  Tablet 3    allopurinol (ZYLOPRIM) 300 MG Tab TAKE 1 TABLET BY MOUTH EVERY  Tablet 3    omeprazole (PRILOSEC) 20 MG delayed-release capsule TAKE 1 CAPSULE BY MOUTH EVERY  Capsule 3    amoxicillin (AMOXIL) 500 MG Cap TAKE 4 CAPS BY MOUTH before dentist appointment 30 Capsule 0    multivitamin (THERAGRAN) Tab Take 1 Tab by mouth every day.      acetaminophen (TYLENOL) 500 MG Tab Take 1,000 mg by mouth every 6 hours as needed (MIGRAINE).      vitamin D (CHOLECALCIFEROL) 1000 UNIT Tab Take 2,000 Units by mouth every day.       No current [facility]-ordered facility-administered medications on file.       Past Medical History:   Diagnosis Date    Adrenal nodule (HCC) 01/31/2022    Angiomyolipoma of right kidney 08/16/2023    Arthritis     Osteo-generalized    Ascending aortic aneurysm (HCC) 04/26/2025    Atherosclerosis of aorta (HCC) 01/10/2024    8/16/2023  BANDAR Arana. HCC Gap Form  Chronic, stable. Continue with current defined treatment plan:  Follow-up at least annually.      Chickenpox     Chronic kidney disease (CKD), stage III (moderate) 01/12/2017    Chronic lower back pain 06/23/2015    Chronic venous insufficiency 09/04/2014    Decreased hearing of right ear 10/01/2020    Dental disorder 12/17/2014    had two extracted last week 12/09/14=were infected     Dermatitis, seborrheic 06/23/2015     IMO load March 2020    Elevated PSA 08/29/2024    GERD (gastroesophageal reflux disease)     Headache, classical migraine     HTN (hypertension) 09/04/2014     Idiopathic chronic gout of multiple sites with tophus 2018    IFG (impaired fasting glucose) 2023    Incisional hernia, without obstruction or gangrene 2017    IPMN (intraductal papillary mucinous neoplasm) 2024    Liver hemangioma 2022    Morbid obesity with BMI of 40.0-44.9, adult (HCC) 2020    Seasonal allergies 2014    Sleep apnea     BiPAP    Tinnitus of both ears 10/13/2014       Past Surgical History:   Procedure Laterality Date    IN LAP,DIAGNOSTIC ABDOMEN  2019    Procedure: LAPAROSCOPY-DIAGNOSTIC;  Surgeon: Julian Carmen M.D.;  Location: SURGERY [facility];  Service: General    LAPAROSCOPIC LYSIS OF ADHESIONS  2019    Procedure: LYSIS, ADHESIONS, LAPAROSCOPIC;  Surgeon: Julian Carmen M.D.;  Location: SURGERY [facility];  Service: General    INCISION HERNIA REPAIR  2019    Procedure: REPAIR, HERNIA, INCISIONAL- POSSIBLE;  Surgeon: Julian Carmen M.D.;  Location: SURGERY [facility];  Service: General    VENTRAL HERNIA REPAIR LAPAROSCOPIC  10/1/2018    Procedure: VENTRAL HERNIA REPAIR LAPAROSCOPIC- FOR INCISIONAL HERNIA WITH MESH;  Surgeon: Julian Carmen M.D.;  Location: SURGERY [facility];  Service: General    GASTRIC SLEEVE LAPAROSCOPY  2014    Performed by Julian Carmen M.D. at SURGERY [facility]    KNEE REPLACEMENT, TOTAL      left    OTHER ORTHOPEDIC SURGERY      bicep tendon repair    APPENDECTOMY      ARTHROSCOPY, KNEE      CHOLECYSTECTOMY      SLEEVE,ANA VASO THIGH      TONSILLECTOMY         Social History     Tobacco Use    Smoking status: Former     Current packs/day: 0.00     Average packs/day: 0.4 packs/day for 26.0 years (10.4 ttl pk-yrs)     Types: Cigarettes     Start date: 1969     Quit date: 1995     Years since quittin.3    Smokeless tobacco: Never   Vaping Use    Vaping status: Never Used   Substance Use Topics    Alcohol use: Not Currently     Comment: very rarely (3-4 times per year)  "   Drug use: No       Social History     Social History Narrative    Not on file       Family History   Problem Relation Age of Onset    Diabetes Mother     Arthritis Mother     Lung Disease Father         emphysema    Arthritis Father     Heart Disease Father     Prostate cancer Father     Hypertension Father     Alcohol/Drug Brother        Health Maintenance: colonoscopy UTD    ROS:   Gen: no fevers/chills, no changes in weight  Eyes: no changes in vision  ENT: no bloody nose  Pulm: no sob, no cough  CV: no palpitations  GI: no nausea/vomiting, no diarrhea  : no dysuria  MSk: +right hip pain and knee discomfort  Skin: no rash  Neuro: no headaches, no numbness/tingling  Heme/Lymph: no abnormal bleeding      Objective:     Exam: /78 (BP Location: Right arm, Patient Position: Sitting, BP Cuff Size: Adult)   Pulse 93   Resp 18   Ht 1.854 m (6' 1\")   Wt (!) 142 kg (312 lb)   SpO2 96%  Body mass index is 41.16 kg/m².    General: Normal appearing. No distress.  HEENT: Normocephalic.    Eyes: Eyes conjunctiva clear lids without ptosis  Neck: Supple. Thyroid is not enlarged.  Pulmonary: Clear to ausculation.  Normal effort. No rales, ronchi, or wheezing.  Cardiovascular: Regular rate and rhythm without murmur.   Abdomen: Soft, nontender, nondistended. Normal bowel sounds.   Neurologic: No gross motor deficits  Lymph: No cervical or supraclavicular lymph nodes are palpable  Skin: Warm and dry.  No obvious lesions.  Musculoskeletal: No extremity cyanosis, clubbing, or edema.  Psych: Normal mood and affect. Alert and oriented. Judgment and insight is normal.      Assessment & Plan:   69 y.o. male with the following -    1. Pulmonary nodules (Primary)  Chronic, stable, asymptomatic.  Max has multiple lung nodules measuring up to 6.2mm that were stable from 1/7/25-4/22/25.  We discussed that lung nodules can be due to inflammation, scarring, infection, tumor/malignancy.  Given the stability of his nodules, his " nodule are behaving benign but continued serial monitoring is recommended.  Recommended continued serial monitoring with a follow-up Ct in 6 months (due around 10/22/25) based pm the Fleischner Society 2017 recommendations and his environmental exposures  .  Encouraged avoidance of tobacco products and lung irritants.  Follow-up and red flag precautions given.  - CT-CHEST (THORAX) W/O; Future    2. Coronary artery calcification  Chronic, unclear control.  Max has been having occasional chest discomfort for the least few months with no recent changes.  His PCP recently ordered a stress test with the final results pending.  Recommended continue close follow-up with his PCP for monitoring and evaluation.  ER precautions given. Patient expressed understanding and agreement with plan.    36 minutes was spent face-to-face and chart reviewing for this encounter    Return in about 6 months (around 11/3/2025) for follow-up lung nodules after CT obtained.    Please note that this dictation was created using voice recognition software. I have made every reasonable attempt to correct obvious errors, but I expect that there are errors of grammar and possibly content that I did not discover before finalizing the note.

## 2025-05-14 ENCOUNTER — OFFICE VISIT (OUTPATIENT)
Dept: SLEEP MEDICINE | Facility: MEDICAL CENTER | Age: 70
End: 2025-05-14
Attending: NURSE PRACTITIONER
Payer: MEDICARE

## 2025-05-14 VITALS
RESPIRATION RATE: 18 BRPM | HEART RATE: 93 BPM | WEIGHT: 312 LBS | BODY MASS INDEX: 41.35 KG/M2 | OXYGEN SATURATION: 96 % | SYSTOLIC BLOOD PRESSURE: 110 MMHG | HEIGHT: 73 IN | DIASTOLIC BLOOD PRESSURE: 78 MMHG

## 2025-05-14 DIAGNOSIS — I25.10 CORONARY ARTERY CALCIFICATION: ICD-10-CM

## 2025-05-14 DIAGNOSIS — R91.8 PULMONARY NODULES: Primary | ICD-10-CM

## 2025-05-14 PROCEDURE — 99213 OFFICE O/P EST LOW 20 MIN: CPT | Performed by: FAMILY MEDICINE

## 2025-05-14 PROCEDURE — 78452 HT MUSCLE IMAGE SPECT MULT: CPT | Mod: 26 | Performed by: INTERNAL MEDICINE

## 2025-05-14 PROCEDURE — 3074F SYST BP LT 130 MM HG: CPT | Performed by: FAMILY MEDICINE

## 2025-05-14 PROCEDURE — 3078F DIAST BP <80 MM HG: CPT | Performed by: FAMILY MEDICINE

## 2025-05-14 PROCEDURE — 99203 OFFICE O/P NEW LOW 30 MIN: CPT | Performed by: FAMILY MEDICINE

## 2025-05-14 PROCEDURE — 93018 CV STRESS TEST I&R ONLY: CPT | Performed by: INTERNAL MEDICINE

## 2025-05-14 ASSESSMENT — FIBROSIS 4 INDEX: FIB4 SCORE: 2.52

## 2025-05-14 NOTE — Clinical Note
It was a pleasure seeing Max in the Lung Nodule Clinic today.  I have attached the encounter note for your review. -Dr. Naz Pineda

## 2025-05-27 ENCOUNTER — RESULTS FOLLOW-UP (OUTPATIENT)
Dept: MEDICAL GROUP | Facility: MEDICAL CENTER | Age: 70
End: 2025-05-27

## 2025-05-27 DIAGNOSIS — I77.810 ASCENDING AORTA DILATATION (HCC): ICD-10-CM

## 2025-05-27 DIAGNOSIS — I25.10 CALCIFICATION OF CORONARY ARTERY: Primary | ICD-10-CM

## 2025-05-27 DIAGNOSIS — I25.9 CARDIAC ISCHEMIA: ICD-10-CM

## 2025-06-05 NOTE — Clinical Note
[facility]  [address] [place] [address]    [id number]    Max Alberto  [address]   [place] [address]    June 5, 2025    Member Name: Max Alberto   Member Number: [id number]   Reference Number: [id number]   Approved Services: Echos and EKG   Approved Service Dates: 06/05/2025 - 10/03/2025   Requesting Provider: Indiana Chen   Requested Provider: [facility]     Dear Max Angel Luisjasiel Alberto:    The following medical service(s) requested by Indiana Chen have been approved:    Procedure Code Procedure Code Name Requested Quantity Approved Quantity Status   86571 (CPT®) [place] ECHO HEART XTHORACIC,COMPLETE W DOPPLER 1 1 Authorized       Approved Quantity means the number of visits approved for medication treatments and/or medical services.    The services should be provided by [facility] no later than 10/03/2025. Please contact the provider listed below with any questions.     Provider Information:  [facility]  [phone]    Your plan benefit may require a deductible, co-payment or coinsurance for these services. This authorization does not guarantee [facility] will pay the claim for services that you receive. Payment by [facility] for these services is subject to the terms of your Evidence of Coverage, your eligibility at the time of service, and confirmation of benefit coverage.    For any questions or additional information, please contact Customer Service:    [facility] Toll Free: [phone]  Aveg[employer] users dial: [phone]   Call Center Hours:  Oct 1 - Mar 31, Mon - Fri 7 AM to 8 PM PST  Oct 1 - Mar 31, Sat - Sun 8 AM to 8 PM PST  Apr 1 - Sep 30, Mon - Fri 7 AM to 8 PM PST   Office Hours: Mon - Fri 8 AM to 5 PM PST   E-mail: [email or web address]   Website:  [email or web address]      This information is available for free in other languages. Please contact Customer  Service at the phone number above for more information. [facility] complies with applicable Federal civil rights laws and does not discriminate on the basis of race, color, national origin, age, disability or sex.    Sincerely,     Healthcare Utilization Management Department     Cc: [facility]   Indiana Chen    Multi-Language Insert  Multi- Services  English: We have free  services to answer any questions you may have about our health or drug plan.  To get an , just call us at [phone].  Someone who speaks English/Language can help you.  This is a free service.  Cook Islander: Tenemos servicios de intérprete sin costo alguno  para responder cualquier pregunta que pueda tener sobre nuestro plan de alejandro o medicamentos. Para hablar con un intérprete, por favor llame al [phone]. Alguien que hable español le podrá ayudar. Sofia es un servicio gratuito.  Chinese Mandarin: ?????????????????????????????? ???????????????? [phone]????????????????? ?????????  Chinese Cantonese: ?????????????????????????????? ????????????? [phone]???????????????????? ????????  Tagalog:  Agustina delaney serbisyo sa laytonsalavell currya emily veliz hinggilynn ruvalcaba o panggamot.  marjan Gibbons  [phone]. Abdiaziz ballesterosta ng Tagalog.  Prince elliott.  Kyrgyz:  Nous proposons julio services gratuits d'interprétation pour répondre à toutes julia questions relatives à notre régime de santé ou d'assurance-médicaments. Pour accéder au service d'interprétation, il vous suffit de nous appeler au [phone]. Un interlocuteur parMilwaukee [facility] pourra vous aider. Ce service est gratuit.  Nicaraguan:  Izabela summersi có d?ch v? thông d?ch mi?n phí ð? tr? l?i các câu h?i v? chýõng s?c kh?e và chýõng trình thu?c men. N?u quí v? c?n  thông d?ch viên richy g?i [phone] s? có nhân viên nói ti?ng Vi?t giúp ð? quí v?. Ðây là d?ch v? mi?n phí .  Moldovan:  Unser kostenloser Dolmetscherservice beantwortet Ihren Fragen zu unserem Gesundheits- und Arzneimittelplan. Unsere Dolmetscher erreichen Sie [phone]. Man wird Ihnen ady auf [facility]. Dieser Service ist radhaEncompass Health.  Belarusian:  ??? ?? ?? ?? ?? ??? ?? ??? ?? ???? ?? ?? ???? ???? ????. ?? ???? ????? ?? [phone] ??? ??? ????.  ???? ?? ???? ?? ?? ????. ? ???? ??? ?????.   Iranian: Åñëè ó âàñ âîçíèêíóò âîïðîñû îòíîñèòåëüíî ñòðàõîâîãî èëè ìåäèêàìåíòíîãî ïëàíà, âû ìîæåòå âîñïîëüçîâàòüñÿ íàøèìè áåñïëàòíûìè óñëóãàìè ïåðåâîä÷èêîâ. ×òîáû âîñïîëüçîâàòüñÿ óñëóãàìè ïåðåâîä÷èêà, ïîçâîíèòå íàì ïî òåëåôîíó [phone]. Âàì îêàæåò ïîìîùü ñîòðóäíèê, êîòîðûé ãîâîðèò ïî-póññêè. Äàííàÿ óñëóãà áåñïëàòíàÿ.  Faroese: ÅääÇ äÞÏã ÎÏãÇÊ ÇáãÊÑÌã ÇáÝæÑí ÇáãÌÇäíÉ ááÅÌÇÈÉ Úä Ãí ÃÓÆáÉ ÊÊÚáÞ ÈÇáÕÍÉ Ãæ ÌÏæá ÇáÃÏæíÉ áÏíäÇ. ááÍÕæá Úáì ãÊÑÌã ÝæÑí¡ áíÓ Úáíß Óæì ÇáÇÊÕÇá ÈäÇ Úáì [phone] . ÓíÞæã ÔÎÕ ãÇ íÊÍÏË ÇáÚÑÈíÉ ÈãÓÇÚÏÊß. åÐå ÎÏãÉ ãÌÇäíÉ.  Ward: ????? ????????? ?? ??? ?? ????? ?? ???? ??? ???? ???? ?? ?????? ?? ???? ???? ?? ??? ????? ??? ????? ???????? ?????? ?????? ???. ?? ???????? ??????? ???? ?? ???, ?? ???? [phone] ?? ??? ????. ??? ??????? ?? ?????? ????? ?? ???? ??? ?? ???? ??. ?? ?? ????? ???? ??.   German:  È disponibile un servizio di interpretariato gratuito per rispondere a eventuali domande sul nostro piano sanitario e farmaceutico. Per un interprete, contattare il leda [phone]. Un nostro incaricato kit parla Italianovi fornirà l'assistenza necessaria. È un servizio gratuito.  Portugués:  Dispomos de serviços de interpretação gratuitos para responder a qualquer questão que tenha acerca do nosso plano de saúde ou de medicação. Para obter um intérprete, contacte-nos através do número [phone]. Irá encontrar alguém que fale o idioma  Português para o ajudar. Sofia serviço é  gratuito.  Khmer Creole:  Nou genyen sèvis entèprèt gratis shoaib reponn tout kesyon ou ta genyen konsènan plan medikal oswa dwòg nou an.  Shoaib jwenn yon entèprèt, jis rele nou nan [phone]. Yon moun ki pale Kreyòl kapab dinora w.  Sa a se yon sèvis ki gratis.  Polish:  Umo¿liwiamy bezp³atne skorzystanie z us³ug t³umacza ustnego, który pomo¿e w uzyskaniu odpowiedzi na temat planu zdrowotnego lub dawkowania cierraw. Carley skorzystaæ z pomocy t³umacza znaj¹cego lakisha smith¿y zadzwoniæ pod numer [phone]. Ta us³uga jest bezp³atna.  Saudi Arabian: ????? ??????? ????????????????????? ??????????????????????????????????[phone] ???????????????? ? ????????????????? ?????

## 2025-06-09 ENCOUNTER — HOSPITAL ENCOUNTER (OUTPATIENT)
Dept: CARDIOLOGY | Facility: MEDICAL CENTER | Age: 70
End: 2025-06-09
Attending: NURSE PRACTITIONER
Payer: MEDICARE

## 2025-06-09 ENCOUNTER — TELEPHONE (OUTPATIENT)
Dept: CARDIOLOGY | Facility: MEDICAL CENTER | Age: 70
End: 2025-06-09

## 2025-06-09 VITALS
HEIGHT: 73 IN | DIASTOLIC BLOOD PRESSURE: 82 MMHG | BODY MASS INDEX: 41.75 KG/M2 | HEART RATE: 60 BPM | WEIGHT: 315 LBS | OXYGEN SATURATION: 97 % | SYSTOLIC BLOOD PRESSURE: 128 MMHG | RESPIRATION RATE: 16 BRPM

## 2025-06-09 DIAGNOSIS — I77.810 ASCENDING AORTA DILATATION (HCC): ICD-10-CM

## 2025-06-09 DIAGNOSIS — I25.9 CARDIAC ISCHEMIA: ICD-10-CM

## 2025-06-09 DIAGNOSIS — I25.10 CALCIFICATION OF CORONARY ARTERY: ICD-10-CM

## 2025-06-09 DIAGNOSIS — I10 PRIMARY HYPERTENSION: ICD-10-CM

## 2025-06-09 DIAGNOSIS — G47.33 OSA TREATED WITH BIPAP: ICD-10-CM

## 2025-06-09 DIAGNOSIS — R94.39 ABNORMAL CARDIOVASCULAR STRESS TEST: Primary | ICD-10-CM

## 2025-06-09 DIAGNOSIS — R07.89 OTHER CHEST PAIN: ICD-10-CM

## 2025-06-09 LAB
EKG IMPRESSION: NORMAL
LV EJECT FRACT  99904: 58
LV EJECT FRACT MOD 2C 99903: 56.19
LV EJECT FRACT MOD 4C 99902: 60.78
LV EJECT FRACT MOD BP 99901: 58.26

## 2025-06-09 PROCEDURE — 93005 ELECTROCARDIOGRAM TRACING: CPT | Mod: TC | Performed by: INTERNAL MEDICINE

## 2025-06-09 PROCEDURE — 93306 TTE W/DOPPLER COMPLETE: CPT

## 2025-06-09 PROCEDURE — 3079F DIAST BP 80-89 MM HG: CPT | Performed by: INTERNAL MEDICINE

## 2025-06-09 PROCEDURE — 99214 OFFICE O/P EST MOD 30 MIN: CPT | Performed by: INTERNAL MEDICINE

## 2025-06-09 PROCEDURE — 99205 OFFICE O/P NEW HI 60 MIN: CPT | Performed by: INTERNAL MEDICINE

## 2025-06-09 PROCEDURE — 99213 OFFICE O/P EST LOW 20 MIN: CPT | Mod: 25 | Performed by: INTERNAL MEDICINE

## 2025-06-09 PROCEDURE — 3074F SYST BP LT 130 MM HG: CPT | Performed by: INTERNAL MEDICINE

## 2025-06-09 PROCEDURE — G2211 COMPLEX E/M VISIT ADD ON: HCPCS | Performed by: INTERNAL MEDICINE

## 2025-06-09 PROCEDURE — 93010 ELECTROCARDIOGRAM REPORT: CPT | Performed by: INTERNAL MEDICINE

## 2025-06-09 PROCEDURE — 93306 TTE W/DOPPLER COMPLETE: CPT | Mod: 26 | Performed by: INTERNAL MEDICINE

## 2025-06-09 RX ORDER — NEBIVOLOL 5 MG/1
5 TABLET ORAL DAILY
Qty: 90 TABLET | Refills: 4 | Status: SHIPPED | OUTPATIENT
Start: 2025-06-09

## 2025-06-09 ASSESSMENT — ENCOUNTER SYMPTOMS
PALPITATIONS: 0
LOSS OF CONSCIOUSNESS: 0
EYE DISCHARGE: 0
DOUBLE VISION: 0
SHORTNESS OF BREATH: 0
DEPRESSION: 0
BRUISES/BLEEDS EASILY: 0
CLAUDICATION: 0
PND: 0
DIZZINESS: 0
EYE PAIN: 0
ABDOMINAL PAIN: 0
SENSORY CHANGE: 0
WEIGHT LOSS: 0
MYALGIAS: 0
BLOOD IN STOOL: 0
COUGH: 0
BLURRED VISION: 0
HEADACHES: 0
NAUSEA: 0
ORTHOPNEA: 0
SPEECH CHANGE: 0
VOMITING: 0
CHILLS: 0
FEVER: 0
HALLUCINATIONS: 0
FALLS: 0

## 2025-06-09 ASSESSMENT — FIBROSIS 4 INDEX: FIB4 SCORE: 2.52

## 2025-06-09 NOTE — PROGRESS NOTES
Chief Complaint   Patient presents with    Other     NP   DX:Calcification of coronary artery       Subjective     Max Alberto is a 69 y.o. male who presents today due to abnormal nuclear stress test which showed mild coronary ischemia in the mid inferior wall with SDS of 2. Patient has chest pain at sporadic times. No specific precipitating factors or worsening factors. Chest pain is described to be pressure-like sensation however localized at anterior chest without radition. Lasting for seconds to minutes. No prior cardiac problems. No prior cardiac workup.    I have independently interpreted and reviewed echocardiogram's actual images with patient which showed normal left ventricular systolic function. No wall motion abnormality. No evidence of pulmonary hypertension. No significant valvular disease.    I have independently interpreted and reviewed blood tests results with patient in clinic today which showed LDL level of 75, triglycerides level of 307, GFR of 58, K of 4.4, Hgba1c of 5.4.    I have personally interpreted EKG today with patient, there is no evidence of acute coronary syndrome, no evidence of prior infarct, normal [place] and QT interval, no significant conduction disease. Sinus rhythm.        Past Medical History[1]  Past Surgical History[2]  Family History   Problem Relation Age of Onset    Diabetes Mother     Arthritis Mother     Lung Disease Father         emphysema    Arthritis Father     Heart Disease Father     Prostate cancer Father     Hypertension Father     Alcohol/Drug Brother      Social History     Socioeconomic History    Marital status:      Spouse name: Not on file    Number of children: Not on file    Years of education: Not on file    Highest education level: Associate degree: occupational, technical, or vocational program   Occupational History    Not on file   Tobacco Use    Smoking status: Former     Current packs/day: 0.00     Average packs/day: 0.4 packs/day  for 26.0 years (10.4 ttl pk-yrs)     Types: Cigarettes     Start date: 1969     Quit date: 1995     Years since quittin.4    Smokeless tobacco: Never   Vaping Use    Vaping status: Never Used   Substance and Sexual Activity    Alcohol use: Not Currently     Comment: very rarely (3-4 times per year)    Drug use: No    Sexual activity: Yes     Partners: Female   Other Topics Concern    Not on file   Social History Narrative    Not on file     Social Drivers of Health     Financial Resource Strain: Low Risk  (2024)    Overall Financial Resource Strain (CARDIA)     Difficulty of Paying Living Expenses: Not hard at all   Food Insecurity: No Food Insecurity (2024)    Hunger Vital Sign     Worried About Running Out of Food in the Last Year: Never true     Ran Out of Food in the Last Year: Never true   Transportation Needs: No Transportation Needs (2024)    PRAPARE - Transportation     Lack of Transportation (Medical): No     Lack of Transportation (Non-Medical): No   Physical Activity: Insufficiently Active (2024)    Exercise Vital Sign     Days of Exercise per Week: 2 days     Minutes of Exercise per Session: 30 min   Stress: No Stress Concern Present (2024)    [employer] [place] [employer] - Occupational Stress Questionnaire     Feeling of Stress : Not at all   Social Connections: Moderately Isolated (2024)    Social Connection and Isolation Panel [NHANES]     Frequency of Communication with Friends and Family: More than three times a week     Frequency of Social Gatherings with Friends and Family: Three times a week     Attends Judaism Services: Never     Active Member of Clubs or Organizations: No     Attends Club or Organization Meetings: Never     Marital Status:    Intimate Partner Violence: Not on file   Housing Stability: Low Risk  (2024)    Housing Stability Vital Sign     Unable to Pay for Housing in the Last Year: No     Number of Times Moved  "in the Last Year: 0     Homeless in the Last Year: No     Allergies[3]  Encounter Medications[4]  Review of Systems   Constitutional:  Negative for chills, fever, malaise/fatigue and weight loss.   HENT:  Negative for ear discharge, ear pain, hearing loss and nosebleeds.    Eyes:  Negative for blurred vision, double vision, pain and discharge.   Respiratory:  Negative for cough and shortness of breath.    Cardiovascular:  Positive for chest pain. Negative for palpitations, orthopnea, claudication, leg swelling and PND.   Gastrointestinal:  Negative for abdominal pain, blood in stool, melena, nausea and vomiting.   Genitourinary:  Negative for dysuria and hematuria.   Musculoskeletal:  Negative for falls, joint pain and myalgias.   Skin:  Negative for itching and rash.   Neurological:  Negative for dizziness, sensory change, speech change, loss of consciousness and headaches.   Endo/Heme/Allergies:  Negative for environmental allergies. Does not bruise/bleed easily.   Psychiatric/Behavioral:  Negative for depression, hallucinations and suicidal ideas.      Objective     /82 (BP Location: Left arm, Patient Position: Sitting, BP Cuff Size: Adult long)   Pulse 60   Resp 16   Ht 1.854 m (6' 1\")   Wt (!) 143 kg (315 lb)   SpO2 97%   BMI 41.56 kg/m²     Physical Exam  Vitals and nursing note reviewed.   Constitutional:       General: He is not in acute distress.     Appearance: He is not diaphoretic.   HENT:      Head: Normocephalic and atraumatic.      Right Ear: External ear normal.      Left Ear: External ear normal.      Nose: No congestion or rhinorrhea.   Eyes:      General:         Right eye: No discharge.         Left eye: No discharge.   Neck:      Thyroid: No thyromegaly.      Vascular: No JVD.   Cardiovascular:      Rate and Rhythm: Normal rate and regular rhythm.      Pulses: Normal pulses.   Pulmonary:      Effort: No respiratory distress.   Abdominal:      General: There is no distension.      " Tenderness: There is no abdominal tenderness.   Musculoskeletal:         General: No swelling or tenderness.      Right lower leg: No edema.      Left lower leg: No edema.   Skin:     General: Skin is warm and dry.   Neurological:      Mental Status: He is alert and oriented to person, place, and time.      Cranial Nerves: No cranial nerve deficit.   Psychiatric:         Behavior: Behavior normal.                Assessment & Plan     1. Abnormal cardiovascular stress test  CL-LEFT HEART CATHETERIZATION WITH POSSIBLE INTERVENTION    nebivolol (BYSTOLIC) 5 MG Tab tablet      2. Primary hypertension  EKG    CL-LEFT HEART CATHETERIZATION WITH POSSIBLE INTERVENTION    nebivolol (BYSTOLIC) 5 MG Tab tablet      3. Other chest pain  CL-LEFT HEART CATHETERIZATION WITH POSSIBLE INTERVENTION    nebivolol (BYSTOLIC) 5 MG Tab tablet      4. DEDRA treated with BiPAP        5. Ascending aorta dilatation (HCC) [I77.810]        6. Calcification of coronary artery [I25.10]  APOLIPOPROTEIN B    Basic Metabolic Panel    Lipoprotein (a)    HEMOGLOBIN A1C    LIPID PANEL      7. Cardiac ischemia [I25.9]            Medical Decision Making: Today's Assessment/Status/Plan:   Based on the abnormal cardiac nuclear scan stress test with significant coronary ischemia SDS score along with symptomatology and LV dysfunction, I do think that patient should be further investigated with an invasive coronary angiogram to further delineate patient's coronary anatomy and potential treatment.  Risks and benefits were explained to patient.  Risks including stroke, death, infection, bleeding, etc.. were explained to patient.  Patient has accepted risks and agreed to proceed.    Will start Bystolic 5 mg daily.       This visit encounter signifies the visit complexity inherent to evaluation and management associated with medical care services that serve as the continuing focal point for all needed health care services and/or with medical care services that are  part of ongoing care related to this patient's single, serious condition, complex cardiac condition.    Rolando Ji M.D.                         [1]   Past Medical History:  Diagnosis Date    Adrenal nodule (HCC) 01/31/2022    Angiomyolipoma of right kidney 08/16/2023    Arthritis     Osteo-generalized    Ascending aortic aneurysm (HCC) 04/26/2025    Atherosclerosis of aorta (HCC) 01/10/2024    8/16/2023  [facility]  Chronic, stable. Continue with current defined treatment plan:  Follow-up at least annually.      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;  Service: General    LAPAROSCOPIC LYSIS OF ADHESIONS  8/7/2019    Procedure: LYSIS, ADHESIONS, LAPAROSCOPIC;  Surgeon: Julian Carmen M.D.;  Location: SURGERY [facility];  Service: General    INCISION HERNIA REPAIR  8/7/2019    Procedure: REPAIR, HERNIA, INCISIONAL- POSSIBLE;   Surgeon: Julian Carmen M.D.;  Location: SURGERY [facility];  Service: General    VENTRAL HERNIA REPAIR LAPAROSCOPIC  10/1/2018    Procedure: VENTRAL HERNIA REPAIR LAPAROSCOPIC- FOR INCISIONAL HERNIA WITH MESH;  Surgeon: Julian Carmen M.D.;  Location: SURGERY [facility];  Service: General    GASTRIC SLEEVE LAPAROSCOPY  12/29/2014    Performed by Julian Carmen M.D. at SURGERY [facility]    KNEE REPLACEMENT, TOTAL  2012    left    OTHER ORTHOPEDIC SURGERY  1998    bicep tendon repair    APPENDECTOMY      ARTHROSCOPY, KNEE      CHOLECYSTECTOMY      SLEEVE,ANA VASO THIGH      TONSILLECTOMY     [3] No Known Allergies  [4]   Outpatient Encounter Medications as of 6/9/2025   Medication Sig Dispense Refill    nebivolol (BYSTOLIC) 5 MG Tab tablet Take 1 Tablet by mouth every day. 90 Tablet 4    triamterene-hctz (MAXZIDE-25/DYAZIDE) 37.5-25 MG Tab TAKE 1 TABLET BY MOUTH EVERY  Tablet 3    allopurinol (ZYLOPRIM) 300 MG Tab TAKE 1 TABLET BY MOUTH EVERY  Tablet 3    omeprazole (PRILOSEC) 20 MG delayed-release capsule TAKE 1 CAPSULE BY MOUTH EVERY  Capsule 3    amoxicillin (AMOXIL) 500 MG Cap TAKE 4 CAPS BY MOUTH before dentist appointment 30 Capsule 0    multivitamin (THERAGRAN) Tab Take 1 Tab by mouth every day.      acetaminophen (TYLENOL) 500 MG Tab Take 1,000 mg by mouth every 6 hours as needed (MIGRAINE).      vitamin D (CHOLECALCIFEROL) 1000 UNIT Tab Take 2,000 Units by mouth every day.       No facility-administered encounter medications on file as of 6/9/2025.

## 2025-06-10 ENCOUNTER — APPOINTMENT (OUTPATIENT)
Dept: ADMISSIONS | Facility: MEDICAL CENTER | Age: 70
End: 2025-06-10
Attending: INTERNAL MEDICINE
Payer: MEDICARE

## 2025-06-10 NOTE — TELEPHONE ENCOUNTER
Patient is scheduled on 6-16-25 for a C w/poss with Dr. Rodriguez. No meds to stop and patient to check in at 6:30 for an 8:30 procedure. H&P was done on 6-9-25 by Dr. Ji. Pre admit to call patient.

## 2025-06-12 ENCOUNTER — PRE-ADMISSION TESTING (OUTPATIENT)
Dept: ADMISSIONS | Facility: MEDICAL CENTER | Age: 70
End: 2025-06-12
Attending: INTERNAL MEDICINE
Payer: MEDICARE

## 2025-06-12 RX ORDER — KETOCONAZOLE 20 MG/ML
30 SHAMPOO, SUSPENSION TOPICAL
COMMUNITY
Start: 2015-01-08

## 2025-06-12 RX ORDER — KETOCONAZOLE 20 MG/G
CREAM TOPICAL DAILY
COMMUNITY

## 2025-06-13 ENCOUNTER — PRE-ADMISSION TESTING (OUTPATIENT)
Dept: ADMISSIONS | Facility: MEDICAL CENTER | Age: 70
End: 2025-06-13
Attending: INTERNAL MEDICINE
Payer: MEDICARE

## 2025-06-13 DIAGNOSIS — Z01.810 PRE-OPERATIVE CARDIOVASCULAR EXAMINATION: Primary | ICD-10-CM

## 2025-06-13 DIAGNOSIS — Z01.812 PRE-OPERATIVE LABORATORY EXAMINATION: ICD-10-CM

## 2025-06-13 LAB
ALBUMIN SERPL BCP-MCNC: 4 G/DL (ref 3.2–4.9)
ALBUMIN/GLOB SERPL: 1.4 G/DL
ALP SERPL-CCNC: 48 U/L (ref 30–99)
ALT SERPL-CCNC: 23 U/L (ref 2–50)
ANION GAP SERPL CALC-SCNC: 13 MMOL/L (ref 7–16)
APTT PPP: 27 SEC (ref 24.7–36)
AST SERPL-CCNC: 20 U/L (ref 12–45)
BILIRUB SERPL-MCNC: 0.9 MG/DL (ref 0.1–1.5)
BUN SERPL-MCNC: 24 MG/DL (ref 8–22)
CALCIUM ALBUM COR SERPL-MCNC: 8.9 MG/DL (ref 8.5–10.5)
CALCIUM SERPL-MCNC: 8.9 MG/DL (ref 8.5–10.5)
CHLORIDE SERPL-SCNC: 105 MMOL/L (ref 96–112)
CO2 SERPL-SCNC: 22 MMOL/L (ref 20–33)
CREAT SERPL-MCNC: 1.31 MG/DL (ref 0.5–1.4)
EKG IMPRESSION: NORMAL
ERYTHROCYTE [DISTWIDTH] IN BLOOD BY AUTOMATED COUNT: 49.7 FL (ref 35.9–50)
GFR SERPLBLD CREATININE-BSD FMLA CKD-EPI: 58 ML/MIN/1.73 M 2
GLOBULIN SER CALC-MCNC: 2.8 G/DL (ref 1.9–3.5)
GLUCOSE SERPL-MCNC: 87 MG/DL (ref 65–99)
HCT VFR BLD AUTO: 42.9 % (ref 42–52)
HGB BLD-MCNC: 14.3 G/DL (ref 14–18)
INR PPP: 1.1 (ref 0.87–1.13)
MCH RBC QN AUTO: 30.2 PG (ref 27–33)
MCHC RBC AUTO-ENTMCNC: 33.3 G/DL (ref 32.3–36.5)
MCV RBC AUTO: 90.5 FL (ref 81.4–97.8)
PLATELET # BLD AUTO: 184 K/UL (ref 164–446)
PMV BLD AUTO: 9.9 FL (ref 9–12.9)
POTASSIUM SERPL-SCNC: 4 MMOL/L (ref 3.6–5.5)
PROT SERPL-MCNC: 6.8 G/DL (ref 6–8.2)
PROTHROMBIN TIME: 14.2 SEC (ref 12–14.6)
RBC # BLD AUTO: 4.74 M/UL (ref 4.7–6.1)
SODIUM SERPL-SCNC: 140 MMOL/L (ref 135–145)
WBC # BLD AUTO: 9 K/UL (ref 4.8–10.8)

## 2025-06-13 PROCEDURE — 85027 COMPLETE CBC AUTOMATED: CPT

## 2025-06-13 PROCEDURE — 85730 THROMBOPLASTIN TIME PARTIAL: CPT

## 2025-06-13 PROCEDURE — 93005 ELECTROCARDIOGRAM TRACING: CPT | Mod: TC

## 2025-06-13 PROCEDURE — 85610 PROTHROMBIN TIME: CPT

## 2025-06-13 PROCEDURE — 80053 COMPREHEN METABOLIC PANEL: CPT

## 2025-06-13 PROCEDURE — 93010 ELECTROCARDIOGRAM REPORT: CPT | Performed by: INTERNAL MEDICINE

## 2025-06-13 PROCEDURE — 36415 COLL VENOUS BLD VENIPUNCTURE: CPT

## 2025-06-16 ENCOUNTER — HOSPITAL ENCOUNTER (OUTPATIENT)
Facility: MEDICAL CENTER | Age: 70
End: 2025-06-16
Attending: INTERNAL MEDICINE | Admitting: INTERNAL MEDICINE
Payer: MEDICARE

## 2025-06-16 ENCOUNTER — APPOINTMENT (OUTPATIENT)
Dept: CARDIOLOGY | Facility: MEDICAL CENTER | Age: 70
End: 2025-06-16
Attending: INTERNAL MEDICINE
Payer: MEDICARE

## 2025-06-16 VITALS
WEIGHT: 315 LBS | TEMPERATURE: 97.4 F | HEIGHT: 73 IN | HEART RATE: 54 BPM | SYSTOLIC BLOOD PRESSURE: 137 MMHG | DIASTOLIC BLOOD PRESSURE: 75 MMHG | OXYGEN SATURATION: 92 % | RESPIRATION RATE: 16 BRPM | BODY MASS INDEX: 41.75 KG/M2

## 2025-06-16 DIAGNOSIS — R07.89 OTHER CHEST PAIN: ICD-10-CM

## 2025-06-16 DIAGNOSIS — I10 PRIMARY HYPERTENSION: ICD-10-CM

## 2025-06-16 DIAGNOSIS — R94.39 ABNORMAL CARDIOVASCULAR STRESS TEST: ICD-10-CM

## 2025-06-16 PROCEDURE — 700117 HCHG RX CONTRAST REV CODE 255: Performed by: INTERNAL MEDICINE

## 2025-06-16 PROCEDURE — 160046 HCHG PACU - 1ST 60 MINS PHASE II

## 2025-06-16 PROCEDURE — 93458 L HRT ARTERY/VENTRICLE ANGIO: CPT | Mod: 26 | Performed by: INTERNAL MEDICINE

## 2025-06-16 PROCEDURE — A9270 NON-COVERED ITEM OR SERVICE: HCPCS

## 2025-06-16 PROCEDURE — 160193 HCHG PACU STANDARD - 1ST 60 MINS

## 2025-06-16 PROCEDURE — C1769 GUIDE WIRE: HCPCS

## 2025-06-16 PROCEDURE — 700102 HCHG RX REV CODE 250 W/ 637 OVERRIDE(OP)

## 2025-06-16 PROCEDURE — 160002 HCHG RECOVERY MINUTES (STAT)

## 2025-06-16 PROCEDURE — 99152 MOD SED SAME PHYS/QHP 5/>YRS: CPT | Performed by: INTERNAL MEDICINE

## 2025-06-16 PROCEDURE — 700111 HCHG RX REV CODE 636 W/ 250 OVERRIDE (IP): Mod: JZ

## 2025-06-16 PROCEDURE — 700101 HCHG RX REV CODE 250

## 2025-06-16 RX ORDER — ASPIRIN 81 MG/1
TABLET, CHEWABLE ORAL
Status: COMPLETED
Start: 2025-06-16 | End: 2025-06-16

## 2025-06-16 RX ORDER — HEPARIN SODIUM 200 [USP'U]/100ML
INJECTION, SOLUTION INTRAVENOUS
Status: COMPLETED
Start: 2025-06-16 | End: 2025-06-16

## 2025-06-16 RX ORDER — VERAPAMIL HYDROCHLORIDE 2.5 MG/ML
INJECTION INTRAVENOUS
Status: COMPLETED
Start: 2025-06-16 | End: 2025-06-16

## 2025-06-16 RX ORDER — LIDOCAINE HYDROCHLORIDE 20 MG/ML
INJECTION, SOLUTION INFILTRATION; PERINEURAL
Status: COMPLETED
Start: 2025-06-16 | End: 2025-06-16

## 2025-06-16 RX ORDER — MIDAZOLAM HYDROCHLORIDE 1 MG/ML
INJECTION INTRAMUSCULAR; INTRAVENOUS
Status: COMPLETED
Start: 2025-06-16 | End: 2025-06-16

## 2025-06-16 RX ORDER — HEPARIN SODIUM 1000 [USP'U]/ML
INJECTION, SOLUTION INTRAVENOUS; SUBCUTANEOUS
Status: COMPLETED
Start: 2025-06-16 | End: 2025-06-16

## 2025-06-16 RX ADMIN — FENTANYL CITRATE 75 MCG: 50 INJECTION, SOLUTION INTRAMUSCULAR; INTRAVENOUS at 08:56

## 2025-06-16 RX ADMIN — VERAPAMIL HYDROCHLORIDE 2.5 MG: 2.5 INJECTION, SOLUTION INTRAVENOUS at 08:52

## 2025-06-16 RX ADMIN — LIDOCAINE HYDROCHLORIDE: 20 INJECTION, SOLUTION INFILTRATION; PERINEURAL at 08:52

## 2025-06-16 RX ADMIN — MIDAZOLAM HYDROCHLORIDE 1.5 MG: 1 INJECTION, SOLUTION INTRAMUSCULAR; INTRAVENOUS at 08:59

## 2025-06-16 RX ADMIN — NITROGLYCERIN 10 ML: 20 INJECTION INTRAVENOUS at 08:53

## 2025-06-16 RX ADMIN — HEPARIN SODIUM 2000 UNITS: 200 INJECTION, SOLUTION INTRAVENOUS at 08:52

## 2025-06-16 RX ADMIN — HEPARIN SODIUM: 1000 INJECTION, SOLUTION INTRAVENOUS; SUBCUTANEOUS at 08:52

## 2025-06-16 RX ADMIN — ASPIRIN 324 MG: 81 TABLET, CHEWABLE ORAL at 08:51

## 2025-06-16 RX ADMIN — IOHEXOL 39 ML: 350 INJECTION, SOLUTION INTRAVENOUS at 08:59

## 2025-06-16 ASSESSMENT — FIBROSIS 4 INDEX: FIB4 SCORE: 1.59

## 2025-06-16 NOTE — PROCEDURES
Cardiac Catheterization Laboratory Procedure Note    DATE: 6/16/2025    : Atul Rodriguez MD    PROCEDURES PERFORMED:  Left heart catheterization  Coronary angiography  Moderate conscious sedation    INDICATIONS:  The patient is a 69-year-old gentleman with a history of coronary calcification by CT referred for cardiac catheterization to evaluate atypical chest pain symptoms with an equivocal SPECT MPI for ischemia.    CONSENT:  The complete alternatives, risks, and benefits of the procedure were explained to the patient.  Signed informed consent was obtained and placed in the chart prior to the procedure.  A timeout was performed prior to beginning the procedure.    MEDICATIONS:  Lidocaine  Fentanyl  Midazolam  Nitroglycerin  Verapamil  Heparin    MODERATE CONSCIOUS SEDATION:  I personally supervised the administration of moderate conscious sedation by the nursing staff for 15 minutes.  Sedation start time: 8:41 AM  Sedation end time: 8:56 AM    CONTRAST: Omnipaque 39 cc    ACCESS: 6-Italian Glidesheath in the right radial artery.    ESTIMATED BLOOD LOSS: 10 cc    COMPLICATIONS: None    DESCRIPTION OF PROCEDURE:  The patient was brought to the cardiac catheterization laboratory in the fasting state.  The skin over the right wrist was prepped and draped in the usual sterile fashion.  Lidocaine infiltration was used to anesthetize the tissue over the right radial artery.  Using the micropuncture technique, a 6-Italian Glidesheath was inserted in the right radial artery.  A 5-Italian [employer] catheter was then advanced over a Praedicat wire into the left ventricular cavity where it was gently aspirated, flushed, and then withdrawn across the aortic valve with sequential pressures measured.  This catheter was then used to engage the ostium of the left main coronary artery and cineangiograms were obtained in multiple projections for complete evaluation of the left coronary system.  This catheter was then used to  engage the ostium of the right coronary artery and cineangiograms were obtained in multiple projections for complete evaluation of the right coronary system.  At the completion of the case all wires, catheters, and sheaths were removed.  A TR band was placed using the patent hemostasis technique.    HEMODYNAMICS:   Aortic pressure: 102/65 mmHg  Pre-A wave pressure: 10 mmHg  No significant aortic gradient on pullback    CORONARY ANGIOGRAPHY:  The left main coronary artery is patent and bifurcates into the left anterior descending and left circumflex coronary arteries.  The left anterior descending coronary artery is a moderate to large, transapical vessel with proximal to mid luminal irregularities prior to tapering into a small caliber distal vessel without significant disease that wraps around the apex.  It supplies several small diagonal branches without significant disease.  The left circumflex coronary artery is a large, nondominant vessel that supplies a very large first obtuse marginal branch with luminal irregularities that provides multiple limbs and supplies most of the lateral wall.  The ongoing circumflex after the OM1 is a small caliber vessel that supplies several small distal obtuse marginal branches.  The right coronary artery is a moderate to large, dominant vessel with proximal to mid luminal irregularities prior to tapering into a small to moderate caliber distal vessel without significant disease.  Distally, it bifurcates into a small posterior descending coronary artery and several small posterolateral branches without significant disease.    IMPRESSION:  Angiographically mild nonobstructive coronary artery disease.  Normal left heart filling pressures.    RECOMMENDATIONS:  Recovering post procedure unit.  TR band release per protocol.  Continue optimal medical management of cardiovascular risk factors.    NOTIFICATION:  The patient's family was notified of the results of his cardiac  catheterization.

## 2025-06-16 NOTE — OR NURSING
0907 - Patient arrival to [facility] after [facility]. Bedside report  with Leena VICTOR. Pt awake and alert. TR band to right radial is C/D/soft, denies numbness or tingling to right hand. VSS. Denies pain at this time. Educated patient on plan of care and wrist precautions. Belongings returned to patient at bedside  0913 - Tolerating oral intake. Left VM with pt's wife Komal  0917 - Family at bedside  1007 -  2 cc air removed from TR band, no bleeding or oozing and site is soft.  1022 - 3 cc air removed from TR band, no bleeding or oozing and site is soft.  1037 - 3 cc air removed from TR band, no bleeding or oozing and site is soft.  1051 - TR band removed and replaced with gauze and tegaderm dressing. Site c/d/soft  1130 - Discharge instructions given; discussed diet, activity, medications, dressing care, follow up care, and worsening symptoms. Pt and wife verbalized understanding and questions answered.  1133 - Pt's VSS; denies N/V; patient out of bed, denies discomfort. Dressing C/D/soft to right wrist.  IV dc'd. Patient states ready to d/c home.  Pt taken out  in stable condition to meet family with all belongings present.

## 2025-06-16 NOTE — DISCHARGE INSTRUCTIONS
What to Expect Post Sedation    Rest and take it easy for the first 24 hours.  A responsible adult is recommended to remain with you during that time.  It is normal to feel sleepy.  We encourage you to not do anything that requires balance, judgment or coordination.    FOR 24 HOURS DO NOT:  Drive, operate machinery or run household appliances.  Drink beer or alcoholic beverages.  Make important decisions or sign legal documents.    MILD FLU-LIKE SYMPTOMS ARE NORMAL. You may experience generalized muscle aches, throat irritation, headache, and/or some nausea.  If any questions arise, call your provider.  If your provider is not available, please feel free to call the Surgical Center at [phone].    Medications: Resume taking daily medication.  Take prescribed pain medication with food.  If no medication is prescribed, you may take non-aspirin pain medication if needed.  PAIN  medication can be very constipating. Take a stool softener or laxative such as senokot, pericolace, or milk of magnesia if needed.    Diet: Resume your normal diet as tolerated.  A diet low in cholesterol, fat, and sodium is recommended for good health. To avoid nausea, slowly advance diet as tolerated, avoiding spicy or greasy foods for the first day.  Add more substantial food to your diet according to your provider's instructions.     Dressing: Keep dressing and incision dry and intact for 24 hours, may remove dressing and shower after 10 am  on 6/17/25, do not need to replace.  Do not submerge site in water for 7 days.     BOWEL FUNCTION:  Prescription pain medication may cause constipation. If you are having problems, use what you normally would or call your provider for suggestions. It also helps to stay regular by including fiber in your diet (for example: bran or fruits and vegetables) and drink plenty of liquids (water, juice, etc.).    Activity: No strenuous activity for 1 week.  You may tire easily; rest as needed during the  day.    10 Pound Weight Restriction Post Surgery. Do not lift anything heavier than a gallon of milk. Routine activities such as walking and using the stairs are safe. You may sleep in any position that is comfortable. Avoid strenuous activities and exercise that involves twisting, bending, and running.    Discharge Instructions:  POST ANGIOGRAM  General Care Instructions  Maintain a bandage over the incision site for 24 hours.  It's normal to find a small bruise or dime-sized lump at the insertion site. This should disappear within a few weeks.  Do not apply lotions or powders to the site.  Do not immerse the catheter insertion site in water (bathtub/swimming) for five days. It is ok to shower 24 hours after the procedure.  You may resume your normal diet immediately; on the day of your procedure, drink 6-10 glasses of water to help flush the contrast liquid out of your system.  If the doctor inserted the catheter in your arm:  For 5 days, DO NOT lift anything heavier than 10 pounds (approximately a gallon of milk). DO NOT do any heavy pushing, pulling, or twisting.    Medications  If your current medications need to be changed, you will be provided with an updated list of your medications prior to discharge.  If you take warfarin (Coumadin), resume taking your usual dose the evening after the procedure.  DO NOT STOP taking prescribed blood thinning (anti-platelet) medications unless instructed by your cardiologist.  These medications include:  Aspirin, Clopidogrel (Plavix), Ticagrelor (Brilinta), or Prasugrel (Effient)   If you take one of the following anticoagulants, RESUME 24 HOURS after your procedure:  Apixiban (Eliquis), Rivaroxaban (Xarelto), Dabigatran (Pradaxa), Edoxaban (Savaysa)  If you take metformin (Glucophage), RESUME 48 HOURS after your procedure.    When to call your healthcare provider  Call your cardiologist right away at [phone] if you have any of the following:   Problems/Concerns taking  any of your prescribed heart medicines.   The insertion site has increasing pain, swelling, redness, bleeding, or drainage.   Your arm or leg below where the insertion site changes color, is cool, or is numb.   You have chest pain or shortness of breath that does not go away with rest.   Your pulse feels irregular -- very slow (less than 60 beats/minute) or very fast (over 100 beats/minute).   You have dizziness, fainting, or you are very tired.   You are coughing up blood or yellow or green mucus.   You have chills or a fever over 101°F (38.3°C).    If there is bleeding at the catheter insertion site, apply pressure for 10 minutes.  If bleeding persists, call 911, and continue to hold pressure until advanced medical support arrives.

## 2025-07-15 ENCOUNTER — OFFICE VISIT (OUTPATIENT)
Dept: CARDIOLOGY | Facility: MEDICAL CENTER | Age: 70
End: 2025-07-15
Attending: PHYSICIAN ASSISTANT
Payer: MEDICARE

## 2025-07-15 VITALS
OXYGEN SATURATION: 94 % | HEIGHT: 73 IN | RESPIRATION RATE: 16 BRPM | BODY MASS INDEX: 41.75 KG/M2 | SYSTOLIC BLOOD PRESSURE: 126 MMHG | DIASTOLIC BLOOD PRESSURE: 80 MMHG | HEART RATE: 67 BPM | WEIGHT: 315 LBS

## 2025-07-15 DIAGNOSIS — R97.20 ELEVATED PSA: ICD-10-CM

## 2025-07-15 DIAGNOSIS — I10 PRIMARY HYPERTENSION: ICD-10-CM

## 2025-07-15 DIAGNOSIS — M1A.09X1 IDIOPATHIC CHRONIC GOUT OF MULTIPLE SITES WITH TOPHUS: ICD-10-CM

## 2025-07-15 DIAGNOSIS — R94.39 ABNORMAL CARDIOVASCULAR STRESS TEST: ICD-10-CM

## 2025-07-15 DIAGNOSIS — G47.33 OSA TREATED WITH BIPAP: ICD-10-CM

## 2025-07-15 DIAGNOSIS — E78.5 HYPERLIPIDEMIA LDL GOAL <100: ICD-10-CM

## 2025-07-15 DIAGNOSIS — I10 ESSENTIAL HYPERTENSION: Primary | ICD-10-CM

## 2025-07-15 DIAGNOSIS — Z12.5 SCREENING FOR MALIGNANT NEOPLASM OF PROSTATE: ICD-10-CM

## 2025-07-15 DIAGNOSIS — I77.810 ASCENDING AORTA DILATATION (HCC): Primary | ICD-10-CM

## 2025-07-15 DIAGNOSIS — N18.31 STAGE 3A CHRONIC KIDNEY DISEASE: ICD-10-CM

## 2025-07-15 DIAGNOSIS — I87.2 CHRONIC VENOUS INSUFFICIENCY: ICD-10-CM

## 2025-07-15 DIAGNOSIS — E78.1 HYPERTRIGLYCERIDEMIA: ICD-10-CM

## 2025-07-15 DIAGNOSIS — E04.1 RIGHT THYROID NODULE: ICD-10-CM

## 2025-07-15 DIAGNOSIS — I25.10 CALCIFICATION OF CORONARY ARTERY: ICD-10-CM

## 2025-07-15 DIAGNOSIS — R73.01 IFG (IMPAIRED FASTING GLUCOSE): ICD-10-CM

## 2025-07-15 DIAGNOSIS — I34.0 NONRHEUMATIC MITRAL VALVE REGURGITATION: ICD-10-CM

## 2025-07-15 DIAGNOSIS — R07.89 OTHER CHEST PAIN: ICD-10-CM

## 2025-07-15 PROCEDURE — 3074F SYST BP LT 130 MM HG: CPT | Performed by: PHYSICIAN ASSISTANT

## 2025-07-15 PROCEDURE — 99213 OFFICE O/P EST LOW 20 MIN: CPT | Performed by: PHYSICIAN ASSISTANT

## 2025-07-15 PROCEDURE — 99214 OFFICE O/P EST MOD 30 MIN: CPT | Performed by: PHYSICIAN ASSISTANT

## 2025-07-15 PROCEDURE — 3079F DIAST BP 80-89 MM HG: CPT | Performed by: PHYSICIAN ASSISTANT

## 2025-07-15 RX ORDER — NEBIVOLOL 5 MG/1
5 TABLET ORAL DAILY
Qty: 90 TABLET | Refills: 4 | Status: SHIPPED | OUTPATIENT
Start: 2025-07-15

## 2025-07-15 ASSESSMENT — ENCOUNTER SYMPTOMS
MUSCULOSKELETAL NEGATIVE: 1
ABDOMINAL PAIN: 0
LOSS OF CONSCIOUSNESS: 0
WEAKNESS: 0
VOMITING: 0
CLAUDICATION: 0
HEADACHES: 0
PSYCHIATRIC NEGATIVE: 1
SHORTNESS OF BREATH: 1
GASTROINTESTINAL NEGATIVE: 1
NEUROLOGICAL NEGATIVE: 1
NAUSEA: 0
PALPITATIONS: 0
BLURRED VISION: 0
EYES NEGATIVE: 1
DOUBLE VISION: 0
FEVER: 0
PND: 0
ORTHOPNEA: 0
BRUISES/BLEEDS EASILY: 0
CHILLS: 0
MYALGIAS: 0
CONSTITUTIONAL NEGATIVE: 1
DIZZINESS: 0
COUGH: 0

## 2025-07-15 ASSESSMENT — FIBROSIS 4 INDEX: FIB4 SCORE: 1.61

## 2025-07-15 NOTE — PROGRESS NOTES
Chief Complaint   Patient presents with    Other     F/V Dx:Chronic venous insufficiency        Hypertension     F/V Dx:HTN (hypertension)       Subjective     Max Alberto is a 70 y.o. male with a history of nonobstructive CAD, hypertension, venous insufficiency, CKD, ascending aortic aneurysm and DEDRA on BiPAP who presents today for follow-up after angiogram.    His primary cardiologist is Dr. Ji.  He was last seen 6/9/2025 to establish care after an abnormal stress test.  At that visit, he did express concerns of atypical chest pain.  He was recommended for coronary angiogram and was also recommended to start Bystolic 5 mg daily.  Is recommended to repeat labs for further risk ratification and to follow-up in 6 months.  6/16/2025 he underwent coronary angiogram which revealed only mild nonobstructive CAD.    Today, he reports he is doing very well overall.  He does continue to have sharp intermittent chest pains mostly at rest only lasting seconds.  He does also have some shortness of breath when walking up ladders.  He does have some lower extremity edema which is alleviated by compression sock use and triamterene.  No palpitations.  No PND orthopnea.  No dizziness or lightheadedness.  No syncope or presyncope.      His blood pressure at home ranges from 112-138/70-90 and on average is in the 110s over 70s.    Past Medical History[1]  Past Surgical History[2]  Family History   Problem Relation Age of Onset    Diabetes Mother     Arthritis Mother     Lung Disease Father         emphysema    Arthritis Father     Heart Disease Father     Prostate cancer Father     Hypertension Father     Cancer Father     Alcohol abuse Father     Alcohol/Drug Brother      Social History     Socioeconomic History    Marital status:      Spouse name: Not on file    Number of children: Not on file    Years of education: Not on file    Highest education level: Associate degree: occupational, technical, or vocational  program   Occupational History    Not on file   Tobacco Use    Smoking status: Former     Current packs/day: 0.00     Average packs/day: 0.4 packs/day for 26.0 years (10.4 ttl pk-yrs)     Types: Cigarettes     Start date: 1969     Quit date: 1995     Years since quittin.2    Smokeless tobacco: Never   Vaping Use    Vaping status: Never Used   Substance and Sexual Activity    Alcohol use: Not Currently     Comment: very rarely (3-4 times per year)    Drug use: No    Sexual activity: Yes     Partners: Female   Other Topics Concern    Not on file   Social History Narrative    Not on file     Social Drivers of Health     Financial Resource Strain: Low Risk  (2024)    Overall Financial Resource Strain (CARDIA)     Difficulty of Paying Living Expenses: Not hard at all   Food Insecurity: No Food Insecurity (2024)    Hunger Vital Sign     Worried About Running Out of Food in the Last Year: Never true     Ran Out of Food in the Last Year: Never true   Transportation Needs: No Transportation Needs (2024)    PRAPARE - Transportation     Lack of Transportation (Medical): No     Lack of Transportation (Non-Medical): No   Physical Activity: Insufficiently Active (2024)    Exercise Vital Sign     Days of Exercise per Week: 2 days     Minutes of Exercise per Session: 30 min   Stress: No Stress Concern Present (2024)    [employer] - Occupational Stress Questionnaire     Feeling of Stress : Not at all   Social Connections: Moderately Isolated (2024)    Social Connection and Isolation Panel [NHANES]     Frequency of Communication with Friends and Family: More than three times a week     Frequency of Social Gatherings with Friends and Family: Three times a week     Attends L[employer] Services: Never     Active Member of Clubs or Organizations: No     Attends Club or Organization Meetings: Never     Marital Status:    Intimate Partner Violence: Not on file  "  Housing Stability: Low Risk  (8/26/2024)    Housing Stability Vital Sign     Unable to Pay for Housing in the Last Year: No     Number of Times Moved in the Last Year: 0     Homeless in the Last Year: No     Allergies[3]  Encounter Medications[4]  Review of Systems   Constitutional: Negative.  Negative for chills, fever and malaise/fatigue.   HENT: Negative.     Eyes: Negative.  Negative for blurred vision and double vision.   Respiratory:  Positive for shortness of breath. Negative for cough.    Cardiovascular:  Positive for chest pain and leg swelling (occasionally alleviated with compression socks). Negative for palpitations, orthopnea, claudication and PND.   Gastrointestinal: Negative.  Negative for abdominal pain, nausea and vomiting.   Genitourinary: Negative.    Musculoskeletal: Negative.  Negative for myalgias.   Skin: Negative.  Negative for rash.   Neurological: Negative.  Negative for dizziness, loss of consciousness, weakness and headaches.   Endo/Heme/Allergies: Negative.  Does not bruise/bleed easily.   Psychiatric/Behavioral: Negative.                Objective     /80 (BP Location: Left arm, Patient Position: Sitting, BP Cuff Size: Adult)   Pulse 67   Resp 16   Ht 1.854 m (6' 1\")   Wt (!) 148 kg (327 lb)   SpO2 94%   BMI 43.14 kg/m²     Physical Exam  Vitals reviewed.   Constitutional:       General: He is not in acute distress.     Appearance: Normal appearance.   HENT:      Head: Normocephalic and atraumatic.      Right Ear: External ear normal.      Left Ear: External ear normal.   Eyes:      General: No scleral icterus.     Extraocular Movements: Extraocular movements intact.      Conjunctiva/sclera: Conjunctivae normal.      Pupils: Pupils are equal, round, and reactive to light.   Cardiovascular:      Rate and Rhythm: Normal rate and regular rhythm.      Pulses: Normal pulses.      Heart sounds: Normal heart sounds. No murmur heard.     No friction rub. No gallop.      Comments: " Radial cath access site with minimal bruising and no evidence of hematoma, oozing or drainage.  Pulmonary:      Effort: Pulmonary effort is normal.      Breath sounds: Normal breath sounds.   Abdominal:      General: Bowel sounds are normal.      Palpations: Abdomen is soft.      Tenderness: There is no abdominal tenderness.   Musculoskeletal:         General: Normal range of motion.      Cervical back: Normal range of motion and neck supple.      Right lower leg: No edema.      Left lower leg: No edema.   Skin:     General: Skin is warm and dry.      Capillary Refill: Capillary refill takes less than 2 seconds.   Neurological:      General: No focal deficit present.      Mental Status: He is alert and oriented to person, place, and time.   Psychiatric:         Mood and Affect: Mood normal.         Behavior: Behavior normal.         Judgment: Judgment normal.       Lab Results   Component Value Date/Time    CHOLSTRLTOT 176 08/05/2024 06:13 AM    LDL 75 08/05/2024 06:13 AM    HDL 40 08/05/2024 06:13 AM    TRIGLYCERIDE 307 (H) 08/05/2024 06:13 AM       Lab Results   Component Value Date/Time    SODIUM 140 06/13/2025 01:09 PM    POTASSIUM 4.0 06/13/2025 01:09 PM    CHLORIDE 105 06/13/2025 01:09 PM    CO2 22 06/13/2025 01:09 PM    GLUCOSE 87 06/13/2025 01:09 PM    BUN 24 (H) 06/13/2025 01:09 PM    CREATININE 1.31 06/13/2025 01:09 PM     Lab Results   Component Value Date/Time    ALKPHOSPHAT 48 06/13/2025 01:09 PM    ASTSGOT 20 06/13/2025 01:09 PM    ALTSGPT 23 06/13/2025 01:09 PM    TBILIRUBIN 0.9 06/13/2025 01:09 PM       Cardiovascular imaging and procedures:    Echocardiogram 6/9/2025  CONCLUSIONS  No prior study is available for comparison.   Normal left ventricular systolic function.   Mild mitral regurgitation.  Estimated right ventricular systolic pressure is 28 mmHg.    NM stress testing 5/12/2025  NUCLEAR IMAGING INTERPRETATION   Abnormal myocardial perfusion with evidence of small area of mild ischemia in    the mid inferior wall. SDS 2   Normal stress LVEF 63%   Normal/negative TID 1.23 (< 1.3)   ECG INTERPRETATION   Negative stress EKG for ischemia, limited sensitivity    Coronary angiogram 6/16/2025  HEMODYNAMICS:   Aortic pressure: 102/65 mmHg  Pre-A wave pressure: 10 mmHg  No significant aortic gradient on pullback     CORONARY ANGIOGRAPHY:  The left main coronary artery is patent and bifurcates into the left anterior descending and left circumflex coronary arteries.  The left anterior descending coronary artery is a moderate to large, transapical vessel with proximal to mid luminal irregularities prior to tapering into a small caliber distal vessel without significant disease that wraps around the apex.  It supplies several small diagonal branches without significant disease.  The left circumflex coronary artery is a large, nondominant vessel that supplies a very large first obtuse marginal branch with luminal irregularities that provides multiple limbs and supplies most of the lateral wall.  The ongoing circumflex after the OM1 is a small caliber vessel that supplies several small distal obtuse marginal branches.  The right coronary artery is a moderate to large, dominant vessel with proximal to mid luminal irregularities prior to tapering into a small to moderate caliber distal vessel without significant disease.  Distally, it bifurcates into a small posterior descending coronary artery and several small posterolateral branches without significant disease.     IMPRESSION:  Angiographically mild nonobstructive coronary artery disease.  Normal left heart filling pressures.     RECOMMENDATIONS:  Recovering post procedure unit.  TR band release per protocol.  Continue optimal medical management of cardiovascular risk factors.         Assessment & Plan     1. Ascending aorta dilatation (HCC)        2. Abnormal cardiovascular stress test  nebivolol (BYSTOLIC) 5 MG Tab tablet      3. Primary hypertension  nebivolol  (BYSTOLIC) 5 MG Tab tablet      4. Other chest pain  nebivolol (BYSTOLIC) 5 MG Tab tablet      5. DEDRA treated with BiPAP        6. Calcification of coronary artery        7. Nonrheumatic mitral valve regurgitation        8. Chronic venous insufficiency        9. Hypertriglyceridemia            Medical Decision Making: Today's Assessment/Status/Plan:        Abnormal stress test  Nonobstructive CAD  Hypertriglyceridemia  - Chest pain is mostly atypical.  -Only mild luminal abnormalities on coronary angiogram.  -Repeat lipid panel, apolipoprotein B and lipoprotein a as previously ordered.  Consider statin if elevated.    Hypertension  - Blood pressure is well-controlled in office and at home.  - Continue at home blood pressure monitoring with a goal of <130/80.  - Continue Bystolic 5 mg daily and triamterene-hydrochlorothiazide 37.5-25 mg daily.    Mild mitral regurgitation  - Asymptomatic.  - Continue to monitor with repeat echocardiogram every 3 to 5 years.    Venous insufficiency  -Well-controlled.  - Continue triamterene-hydrochlorothiazide per above.  - Continue low-salt diet.  - Continue compression sock use.    CKD  - Stable.  - Continue to monitor.    Ascending aortic aneurysm  - Only borderline dilated at 4.0 and likely normal for body size.  -Continue tight blood pressure control per above.    DEDRA on BiPAP  Pulmonary nodules  -Expresses good compliance.  - Continue follow-up per pulmonary/sleep medicine.    Follow-up in 1 year or sooner with clinical changes.    Encouraged him to reach out via MyChart or telephone with any questions or concerns.    Thank you for allowing me to participate in the care of Max Alberto .    Mirta Michael PA-C, Cardiology  [facility]  [facility], [address]  [address] [address]  Phone: [phone]  Fax: [phone]    PLEASE NOTE: This note was created using voice recognition software. I  have made every reasonable attempt to correct obvious errors, but I expect that there are errors of grammar and possibly content that I did not discover before finalizing the note.                      [1]   Past Medical History:  Diagnosis Date    Adrenal nodule (HCC) 01/31/2022    Anesthesia     See above    Anginal syndrome (HCC)     Intermittent    Angiomyolipoma of right kidney 08/16/2023    Arthritis     Osteo-generalized    Ascending aortic aneurysm (HCC) 04/26/2025    Atherosclerosis of aorta (HCC) 01/10/2024    8/16/2023  [facility] Gap Form  Chronic, stable. Continue with current defined treatment plan:  Follow-up at least annually.      Awareness under anesthesia     Woke up during several procedures such as colonoscopy & endoscopy.    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    IFG (impaired fasting glucose) 08/16/2023    Incisional hernia, without obstruction or gangrene 07/18/2017    IPMN (intraductal papillary mucinous neoplasm) 08/29/2024    Liver hemangioma 01/31/2022    Morbid obesity with BMI of 40.0-44.9, adult (HCC) 09/04/2020    Seasonal allergies 09/04/2014    Sleep apnea 5/2005    BiPAP    Tinnitus of both ears 10/13/2014   [2]   Past Surgical History:  Procedure Laterality Date    MN LAP,DIAGNOSTIC ABDOMEN  08/07/2019    Procedure: LAPAROSCOPY-DIAGNOSTIC;  Surgeon: Julian Carmen M.D.;  Location: SURGERY  [facility];  Service: General    LAPAROSCOPIC LYSIS OF ADHESIONS  08/07/2019    Procedure: LYSIS, ADHESIONS, LAPAROSCOPIC;  Surgeon: Julian Carmen M.D.;  Location: SURGERY [facility];  Service: General    INCISION HERNIA REPAIR  08/07/2019    Procedure: REPAIR, HERNIA, INCISIONAL- POSSIBLE;  Surgeon: Julian Carmen M.D.;  Location: SURGERY [facility];  Service: General    VENTRAL HERNIA REPAIR LAPAROSCOPIC  10/01/2018    Procedure: VENTRAL HERNIA REPAIR LAPAROSCOPIC- FOR INCISIONAL HERNIA WITH MESH;  Surgeon: Julian Carmen M.D.;  Location: SURGERY [facility];  Service: General    GASTRIC SLEEVE LAPAROSCOPY  12/29/2014    Performed by Julian Carmen M.D. at SURGERY [facility]    KNEE REPLACEMENT, TOTAL  2012    left    APPENDECTOMY      ARTHROSCOPY, KNEE      CHOLECYSTECTOMY      OTHER      Gall Bladder 1985, right bicept tendon 1998, gastric sleeve.    OTHER ORTHOPEDIC SURGERY  2012    bicep tendon repair    SLEEVE,ANA VASO THIGH      TONSILLECTOMY     [3] No Known Allergies  [4]   Outpatient Encounter Medications as of 7/15/2025   Medication Sig Dispense Refill    nebivolol (BYSTOLIC) 5 MG Tab tablet Take 1 Tablet by mouth every day. 90 Tablet 4    Acetaminophen (TYLENOL PO) Take 650 mg by mouth 2 times a day as needed. Patient takes 2 tabs      ketoconazole (NIZORAL) 2 % shampoo 30 mL every 48 hours.      ketoconazole (NIZORAL) 2 % Cream Apply  topically every day. Applied daily      Probiotic Product (PRO-BIOTIC BLEND PO) Take 1 Tablet by mouth every day at 6 PM.      Biotin w/ Vitamins C & E (HAIR SKIN & NAILS GUMMIES PO) Take 1 Piece of gum by mouth 2 times a day.      CALCIUM PO Take  by mouth. 2 gummies per day      triamterene-hctz (MAXZIDE-25/DYAZIDE) 37.5-25 MG Tab TAKE 1 TABLET BY MOUTH EVERY  Tablet 3    allopurinol (ZYLOPRIM) 300 MG Tab TAKE 1 TABLET BY MOUTH EVERY  Tablet 3    omeprazole (PRILOSEC) 20 MG delayed-release capsule TAKE 1 CAPSULE BY MOUTH EVERY   Capsule 3    amoxicillin (AMOXIL) 500 MG Cap TAKE 4 CAPS BY MOUTH before dentist appointment 30 Capsule 0    multivitamin (THERAGRAN) Tab Take 1 Tab by mouth every day.      vitamin D (CHOLECALCIFEROL) 1000 UNIT Tab Take 2,000 Units by mouth every day.      [DISCONTINUED] nebivolol (BYSTOLIC) 5 MG Tab tablet Take 1 Tablet by mouth every day. 90 Tablet 4     No facility-administered encounter medications on file as of 7/15/2025.

## 2025-08-25 ENCOUNTER — APPOINTMENT (OUTPATIENT)
Dept: LAB | Facility: MEDICAL CENTER | Age: 70
End: 2025-08-25
Payer: MEDICARE

## 2025-08-26 ENCOUNTER — TELEPHONE (OUTPATIENT)
Dept: MEDICAL GROUP | Facility: MEDICAL CENTER | Age: 70
End: 2025-08-26
Payer: MEDICARE

## (undated) DEVICE — SENSOR SPO2 NEO LNCS ADHESIVE (20/BX) SEE USER NOTES

## (undated) DEVICE — ELECTRODE 850 FOAM ADHESIVE - HYDROGEL RADIOTRNSPRNT (50/PK)

## (undated) DEVICE — WATER IRRIG. STER. 1500 ML - (9/CA)

## (undated) DEVICE — TUBING CLEARLINK DUO-VENT - C-FLO (48EA/CA)

## (undated) DEVICE — TROCAR Z THREAD12MM OPTICAL - NON BLADED (6/BX)

## (undated) DEVICE — GLOVE, BIOGEL ECLIPSE, SZ 7.0, PF LTX (50/BX)

## (undated) DEVICE — TROCAR 5X100 NON BLADED Z-TH - READ KII (6/BX)

## (undated) DEVICE — SUTURE 0 VICRYL PLUS CT-1 - 36 INCH (36/BX)

## (undated) DEVICE — SUTURE 4-0 VICRYL PLUSFS-1 - 27 INCH (36/BX)

## (undated) DEVICE — CLOSURE SKIN STRIP 1/2 X 4 IN - (STERI STRIP) (50/BX 4BX/CA)

## (undated) DEVICE — CANNULA W/SEAL 5X100 Z-THRE - ADED KII (12/BX)

## (undated) DEVICE — GLOVE BIOGEL INDICATOR SZ 8 SURGICAL PF LTX - (50/BX 4BX/CA)

## (undated) DEVICE — MASK ANESTHESIA ADULT  - (100/CA)

## (undated) DEVICE — ELECTRODE DUAL RETURN W/ CORD - (50/PK)

## (undated) DEVICE — NEPTUNE 4 PORT MANIFOLD - (20/PK)

## (undated) DEVICE — PROTECTOR ULNA NERVE - (36PR/CA)

## (undated) DEVICE — SPONGE GAUZESTER 4 X 4 4PLY - (128PK/CA)

## (undated) DEVICE — PACK LAP CHOLE OR - (2EA/CA)

## (undated) DEVICE — GLOVE BIOGEL INDICATOR SZ 7.5 SURGICAL PF LTX - (50PR/BX 4BX/CA)

## (undated) DEVICE — GOWN WARMING STANDARD FLEX - (30/CA)

## (undated) DEVICE — DEVICE 5MM ABSRB STRAP FIXATION  (6EA/BX)

## (undated) DEVICE — BANDAID X-LARGE 2 X 4 IN LF (50EA/BX)

## (undated) DEVICE — SUTURE GENERAL

## (undated) DEVICE — SLEEVE, VASO, THIGH, MED

## (undated) DEVICE — STAPLER SKIN DISP - (6/BX 10BX/CA) VISISTAT

## (undated) DEVICE — LACTATED RINGERS INJ 1000 ML - (14EA/CA 60CA/PF)

## (undated) DEVICE — HEAD HOLDER JUNIOR/ADULT

## (undated) DEVICE — GLOVE BIOGEL SZ 8 SURGICAL PF LTX - (50PR/BX 4BX/CA)

## (undated) DEVICE — DRAPE LAPAROTOMY T SHEET - (12EA/CA)

## (undated) DEVICE — KIT ANESTHESIA W/CIRCUIT & 3/LT BAG W/FILTER (20EA/CA)

## (undated) DEVICE — GLOVE BIOGEL ECLIPSE PF LATEX SIZE 7.5

## (undated) DEVICE — SUTURE 2-0 ETHILON FS - (36/BX) 18 INCH

## (undated) DEVICE — SUTURE 2-0 VICRYL PLUS CT-1 - 8 X 18 INCH(12/BX)

## (undated) DEVICE — BANDAID SHEER STRIP 3/4 IN (100EA/BX 12BX/CA)

## (undated) DEVICE — GLOVE BIOGEL SZ 7.5 SURGICAL PF LTX - (50PR/BX 4BX/CA)

## (undated) DEVICE — SET EXTENSION WITH 2 PORTS (48EA/CA) ***PART #2C8610 IS A SUBSTITUTE*****

## (undated) DEVICE — SUCTION INSTRUMENT YANKAUER BULBOUS TIP W/O VENT (50EA/CA)

## (undated) DEVICE — TUBE NG SALEM SUMP 16FR (50EA/CA)

## (undated) DEVICE — SUTURE 1 VICRYL PLUS CTX - 36 INCH (36/BX)

## (undated) DEVICE — BINDER ABDOMINAL 30X45X12IN - FITS 30-45IN WAIST 12IN HIGH

## (undated) DEVICE — TUBING INSUFFLATION - (10/BX)

## (undated) DEVICE — SODIUM CHL IRRIGATION 0.9% 1000ML (12EA/CA)

## (undated) DEVICE — CANISTER SUCTION 3000ML MECHANICAL FILTER AUTO SHUTOFF MEDI-VAC NONSTERILE LF DISP  (40EA/CA)

## (undated) DEVICE — SET LEADWIRE 5 LEAD BEDSIDE DISPOSABLE ECG (1SET OF 5/EA)

## (undated) DEVICE — STERI STRIP COMPOUND BENZOIN - TINCTURE 0.6ML WITH APPLICATOR (40EA/BX)

## (undated) DEVICE — CHLORAPREP 26 ML APPLICATOR - ORANGE TINT(25/CA)

## (undated) DEVICE — DRESSING ABDOMINAL PAD STERILE 8 X 10" (360EA/CA)"

## (undated) DEVICE — KIT ROOM DECONTAMINATION

## (undated) DEVICE — NEEDLE INSFL 120MM 14GA VRRS - (20/BX)

## (undated) DEVICE — GLOVE BIOGEL SZ 7 SURGICAL PF LTX - (50PR/BX 4BX/CA)

## (undated) DEVICE — PACK MAJOR BASIN - (2EA/CA)